# Patient Record
Sex: FEMALE | Race: WHITE | NOT HISPANIC OR LATINO
[De-identification: names, ages, dates, MRNs, and addresses within clinical notes are randomized per-mention and may not be internally consistent; named-entity substitution may affect disease eponyms.]

---

## 2017-05-30 ENCOUNTER — APPOINTMENT (OUTPATIENT)
Dept: CARDIOLOGY | Facility: CLINIC | Age: 67
End: 2017-05-30

## 2017-05-30 VITALS
WEIGHT: 292 LBS | HEART RATE: 50 BPM | DIASTOLIC BLOOD PRESSURE: 78 MMHG | SYSTOLIC BLOOD PRESSURE: 124 MMHG | BODY MASS INDEX: 45.83 KG/M2 | OXYGEN SATURATION: 96 % | HEIGHT: 67 IN

## 2017-05-30 DIAGNOSIS — Z78.9 OTHER SPECIFIED HEALTH STATUS: ICD-10-CM

## 2017-05-30 DIAGNOSIS — K58.9 IRRITABLE BOWEL SYNDROME W/OUT DIARRHEA: ICD-10-CM

## 2017-05-30 DIAGNOSIS — G51.3 CLONIC HEMIFACIAL SPASM: ICD-10-CM

## 2017-06-13 ENCOUNTER — OUTPATIENT (OUTPATIENT)
Dept: OUTPATIENT SERVICES | Facility: HOSPITAL | Age: 67
LOS: 1 days | End: 2017-06-13

## 2017-06-13 ENCOUNTER — APPOINTMENT (OUTPATIENT)
Dept: CV DIAGNOSITCS | Facility: HOSPITAL | Age: 67
End: 2017-06-13

## 2017-06-13 DIAGNOSIS — M17.10 UNILATERAL PRIMARY OSTEOARTHRITIS, UNSPECIFIED KNEE: ICD-10-CM

## 2017-06-15 ENCOUNTER — OTHER (OUTPATIENT)
Age: 67
End: 2017-06-15

## 2017-07-24 ENCOUNTER — OTHER (OUTPATIENT)
Age: 67
End: 2017-07-24

## 2017-12-12 ENCOUNTER — NON-APPOINTMENT (OUTPATIENT)
Age: 67
End: 2017-12-12

## 2017-12-12 ENCOUNTER — APPOINTMENT (OUTPATIENT)
Dept: CARDIOLOGY | Facility: CLINIC | Age: 67
End: 2017-12-12
Payer: COMMERCIAL

## 2017-12-12 VITALS
WEIGHT: 293 LBS | HEART RATE: 46 BPM | BODY MASS INDEX: 45.99 KG/M2 | DIASTOLIC BLOOD PRESSURE: 77 MMHG | SYSTOLIC BLOOD PRESSURE: 122 MMHG | HEIGHT: 67 IN | OXYGEN SATURATION: 94 %

## 2017-12-12 LAB
BASOPHILS # BLD AUTO: 0.05 K/UL
BASOPHILS NFR BLD AUTO: 0.8 %
EOSINOPHIL # BLD AUTO: 0.38 K/UL
EOSINOPHIL NFR BLD AUTO: 6.3 %
HBA1C MFR BLD HPLC: 5.6 %
HCT VFR BLD CALC: 41.3 %
HGB BLD-MCNC: 13.1 G/DL
IMM GRANULOCYTES NFR BLD AUTO: 0.2 %
LYMPHOCYTES # BLD AUTO: 1.38 K/UL
LYMPHOCYTES NFR BLD AUTO: 22.8 %
MAN DIFF?: NORMAL
MCHC RBC-ENTMCNC: 27.3 PG
MCHC RBC-ENTMCNC: 31.7 GM/DL
MCV RBC AUTO: 86 FL
MONOCYTES # BLD AUTO: 0.58 K/UL
MONOCYTES NFR BLD AUTO: 9.6 %
NEUTROPHILS # BLD AUTO: 3.66 K/UL
NEUTROPHILS NFR BLD AUTO: 60.3 %
PLATELET # BLD AUTO: 276 K/UL
RBC # BLD: 4.8 M/UL
RBC # FLD: 15.8 %
WBC # FLD AUTO: 6.06 K/UL

## 2017-12-12 PROCEDURE — 99214 OFFICE O/P EST MOD 30 MIN: CPT

## 2017-12-12 PROCEDURE — 93000 ELECTROCARDIOGRAM COMPLETE: CPT

## 2017-12-14 LAB
ALBUMIN SERPL ELPH-MCNC: 4.1 G/DL
ALP BLD-CCNC: 35 U/L
ALT SERPL-CCNC: 16 U/L
ANION GAP SERPL CALC-SCNC: 14 MMOL/L
AST SERPL-CCNC: 21 U/L
BILIRUB SERPL-MCNC: 0.3 MG/DL
BUN SERPL-MCNC: 19 MG/DL
CALCIUM SERPL-MCNC: 10.2 MG/DL
CHLORIDE SERPL-SCNC: 103 MMOL/L
CHOLEST SERPL-MCNC: 183 MG/DL
CHOLEST/HDLC SERPL: 2.7 RATIO
CO2 SERPL-SCNC: 24 MMOL/L
CREAT SERPL-MCNC: 0.95 MG/DL
GLUCOSE SERPL-MCNC: 92 MG/DL
HDLC SERPL-MCNC: 68 MG/DL
LDLC SERPL CALC-MCNC: 102 MG/DL
POTASSIUM SERPL-SCNC: 4.7 MMOL/L
PROT SERPL-MCNC: 7.6 G/DL
SODIUM SERPL-SCNC: 141 MMOL/L
TRIGL SERPL-MCNC: 63 MG/DL
TSH SERPL-ACNC: 2.03 UIU/ML

## 2018-10-02 ENCOUNTER — NON-APPOINTMENT (OUTPATIENT)
Age: 68
End: 2018-10-02

## 2018-10-02 ENCOUNTER — APPOINTMENT (OUTPATIENT)
Dept: CARDIOLOGY | Facility: CLINIC | Age: 68
End: 2018-10-02
Payer: COMMERCIAL

## 2018-10-02 VITALS
WEIGHT: 285 LBS | SYSTOLIC BLOOD PRESSURE: 115 MMHG | OXYGEN SATURATION: 95 % | BODY MASS INDEX: 44.73 KG/M2 | DIASTOLIC BLOOD PRESSURE: 78 MMHG | HEART RATE: 46 BPM | HEIGHT: 67 IN

## 2018-10-02 PROCEDURE — 99214 OFFICE O/P EST MOD 30 MIN: CPT

## 2018-10-02 PROCEDURE — 93000 ELECTROCARDIOGRAM COMPLETE: CPT

## 2018-11-26 ENCOUNTER — APPOINTMENT (OUTPATIENT)
Dept: SURGERY | Facility: CLINIC | Age: 68
End: 2018-11-26
Payer: COMMERCIAL

## 2018-11-26 PROCEDURE — 99205K: CUSTOM

## 2018-11-27 ENCOUNTER — NON-APPOINTMENT (OUTPATIENT)
Age: 68
End: 2018-11-27

## 2018-11-27 ENCOUNTER — APPOINTMENT (OUTPATIENT)
Dept: CV DIAGNOSITCS | Facility: HOSPITAL | Age: 68
End: 2018-11-27
Payer: COMMERCIAL

## 2018-11-27 ENCOUNTER — APPOINTMENT (OUTPATIENT)
Dept: CARDIOLOGY | Facility: CLINIC | Age: 68
End: 2018-11-27
Payer: COMMERCIAL

## 2018-11-27 ENCOUNTER — OUTPATIENT (OUTPATIENT)
Dept: OUTPATIENT SERVICES | Facility: HOSPITAL | Age: 68
LOS: 1 days | End: 2018-11-27

## 2018-11-27 VITALS
OXYGEN SATURATION: 94 % | RESPIRATION RATE: 18 BRPM | HEIGHT: 67 IN | BODY MASS INDEX: 44.73 KG/M2 | SYSTOLIC BLOOD PRESSURE: 115 MMHG | WEIGHT: 285 LBS | HEART RATE: 54 BPM | DIASTOLIC BLOOD PRESSURE: 79 MMHG

## 2018-11-27 DIAGNOSIS — I34.0 NONRHEUMATIC MITRAL (VALVE) INSUFFICIENCY: ICD-10-CM

## 2018-11-27 PROCEDURE — 99214 OFFICE O/P EST MOD 30 MIN: CPT

## 2018-11-27 PROCEDURE — 93000 ELECTROCARDIOGRAM COMPLETE: CPT

## 2018-11-27 PROCEDURE — 93306 TTE W/DOPPLER COMPLETE: CPT | Mod: 26

## 2018-11-27 RX ORDER — CELECOXIB 200 MG/1
200 CAPSULE ORAL DAILY
Refills: 0 | Status: DISCONTINUED | COMMUNITY
End: 2018-11-27

## 2018-11-27 NOTE — ASSESSMENT
[FreeTextEntry1] : 69 yo w \par Obesity - not able to see Dr Jennings. Will discuss after breast surgery. recommended programming at [facility] also.\par VHD - MR only mild on today's echo 9 reviewed images). No symtpoms.\par Breast surgery - with Dr Rea scheduled for next month\par anxiety/depression - no change -- sees therapist regualrly

## 2018-11-27 NOTE — PHYSICAL EXAM
[General Appearance - Well Developed] : well developed [General Appearance - Well Nourished] : well nourished [Normal Conjunctiva] : the conjunctiva exhibited no abnormalities [Eyelids - No Xanthelasma] : the eyelids demonstrated no xanthelasmas [Normal Oral Mucosa] : normal oral mucosa [No Oral Pallor] : no oral pallor [No Oral Cyanosis] : no oral cyanosis [Normal Jugular Venous A Waves Present] : normal jugular venous A waves present [Normal Jugular Venous V Waves Present] : normal jugular venous V waves present [No Jugular Venous Castellanos A Waves] : no jugular venous castellanos A waves [Respiration, Rhythm And Depth] : normal respiratory rhythm and effort [Exaggerated Use Of Accessory Muscles For Inspiration] : no accessory muscle use [Auscultation Breath Sounds / Voice Sounds] : lungs were clear to auscultation bilaterally [Not Palpable] : not palpable [No Precordial Heave] : no precordial heave was noted [Normal S1] : normal S1 [Normal S2] : normal S2 [Click] : no click [2+] : left 2+ [Right Carotid Bruit] : no bruit heard over the right carotid [Left Carotid Bruit] : no bruit heard over the left carotid [1+] : left 1+ [No Abnormalities] : the abdominal aorta was not enlarged and no bruit was heard [___ +] : bilateral [unfilled]U+ pitting edema to the ankles [Abdomen Soft] : soft [Abdomen Tenderness] : non-tender [Abdomen Mass (___ Cm)] : no abdominal mass palpated [Abnormal Walk] : normal gait [Gait - Sufficient For Exercise Testing] : the gait was sufficient for exercise testing [Nail Clubbing] : no clubbing of the fingernails [Cyanosis, Localized] : no localized cyanosis [Petechial Hemorrhages (___cm)] : no petechial hemorrhages [Skin Color & Pigmentation] : normal skin color and pigmentation [] : no rash [No Venous Stasis] : no venous stasis [Skin Lesions] : no skin lesions [No Skin Ulcers] : no skin ulcer [No Xanthoma] : no  xanthoma was observed [Oriented To Time, Place, And Person] : oriented to person, place, and time [Impaired Insight] : insight and judgment were intact [FreeTextEntry1] : depressed affect

## 2018-11-27 NOTE — REASON FOR VISIT
[FreeTextEntry1] : 67 yo obese woman with hypothyroidism , depression , peripheral edema here for visit prior to scheduled breast biopsy w KK on Dec 18th.\par \par Stopped celebrex and trying to manage without. \par saw Dr Hernandez but would prefer to continue with her opoutpt therapist.\par \par No CP, BUCKLEY or new complaints.\par

## 2018-11-29 ENCOUNTER — TRANSCRIPTION ENCOUNTER (OUTPATIENT)
Age: 68
End: 2018-11-29

## 2018-12-10 ENCOUNTER — APPOINTMENT (OUTPATIENT)
Dept: ULTRASOUND IMAGING | Facility: IMAGING CENTER | Age: 68
End: 2018-12-10
Payer: COMMERCIAL

## 2018-12-10 ENCOUNTER — OUTPATIENT (OUTPATIENT)
Dept: OUTPATIENT SERVICES | Facility: HOSPITAL | Age: 68
LOS: 1 days | End: 2018-12-10
Payer: COMMERCIAL

## 2018-12-10 ENCOUNTER — OUTPATIENT (OUTPATIENT)
Dept: OUTPATIENT SERVICES | Facility: HOSPITAL | Age: 68
LOS: 1 days | End: 2018-12-10

## 2018-12-10 VITALS
WEIGHT: 287.04 LBS | HEART RATE: 57 BPM | RESPIRATION RATE: 16 BRPM | SYSTOLIC BLOOD PRESSURE: 118 MMHG | DIASTOLIC BLOOD PRESSURE: 80 MMHG | HEIGHT: 66.5 IN | OXYGEN SATURATION: 98 % | TEMPERATURE: 97 F

## 2018-12-10 DIAGNOSIS — R92.8 OTHER ABNORMAL AND INCONCLUSIVE FINDINGS ON DIAGNOSTIC IMAGING OF BREAST: ICD-10-CM

## 2018-12-10 DIAGNOSIS — Z98.890 OTHER SPECIFIED POSTPROCEDURAL STATES: Chronic | ICD-10-CM

## 2018-12-10 DIAGNOSIS — Z96.653 PRESENCE OF ARTIFICIAL KNEE JOINT, BILATERAL: Chronic | ICD-10-CM

## 2018-12-10 DIAGNOSIS — I34.0 NONRHEUMATIC MITRAL (VALVE) INSUFFICIENCY: ICD-10-CM

## 2018-12-10 DIAGNOSIS — Z00.8 ENCOUNTER FOR OTHER GENERAL EXAMINATION: ICD-10-CM

## 2018-12-10 DIAGNOSIS — Z90.89 ACQUIRED ABSENCE OF OTHER ORGANS: Chronic | ICD-10-CM

## 2018-12-10 DIAGNOSIS — Z85.828 PERSONAL HISTORY OF OTHER MALIGNANT NEOPLASM OF SKIN: Chronic | ICD-10-CM

## 2018-12-10 DIAGNOSIS — J45.909 UNSPECIFIED ASTHMA, UNCOMPLICATED: ICD-10-CM

## 2018-12-10 DIAGNOSIS — E66.9 OBESITY, UNSPECIFIED: ICD-10-CM

## 2018-12-10 DIAGNOSIS — E03.9 HYPOTHYROIDISM, UNSPECIFIED: ICD-10-CM

## 2018-12-10 LAB
BUN SERPL-MCNC: 25 MG/DL — HIGH (ref 7–23)
CALCIUM SERPL-MCNC: 9.9 MG/DL — SIGNIFICANT CHANGE UP (ref 8.4–10.5)
CHLORIDE SERPL-SCNC: 105 MMOL/L — SIGNIFICANT CHANGE UP (ref 98–107)
CO2 SERPL-SCNC: 26 MMOL/L — SIGNIFICANT CHANGE UP (ref 22–31)
CREAT SERPL-MCNC: 0.82 MG/DL — SIGNIFICANT CHANGE UP (ref 0.5–1.3)
GLUCOSE SERPL-MCNC: 94 MG/DL — SIGNIFICANT CHANGE UP (ref 70–99)
HCT VFR BLD CALC: 43.5 % — SIGNIFICANT CHANGE UP (ref 34.5–45)
HGB BLD-MCNC: 13.8 G/DL — SIGNIFICANT CHANGE UP (ref 11.5–15.5)
MCHC RBC-ENTMCNC: 27.8 PG — SIGNIFICANT CHANGE UP (ref 27–34)
MCHC RBC-ENTMCNC: 31.7 % — LOW (ref 32–36)
MCV RBC AUTO: 87.7 FL — SIGNIFICANT CHANGE UP (ref 80–100)
NRBC # FLD: 0 — SIGNIFICANT CHANGE UP
PLATELET # BLD AUTO: 250 K/UL — SIGNIFICANT CHANGE UP (ref 150–400)
PMV BLD: 10.5 FL — SIGNIFICANT CHANGE UP (ref 7–13)
POTASSIUM SERPL-MCNC: 4.3 MMOL/L — SIGNIFICANT CHANGE UP (ref 3.5–5.3)
POTASSIUM SERPL-SCNC: 4.3 MMOL/L — SIGNIFICANT CHANGE UP (ref 3.5–5.3)
RBC # BLD: 4.96 M/UL — SIGNIFICANT CHANGE UP (ref 3.8–5.2)
RBC # FLD: 14.8 % — HIGH (ref 10.3–14.5)
SODIUM SERPL-SCNC: 142 MMOL/L — SIGNIFICANT CHANGE UP (ref 135–145)
WBC # BLD: 6.35 K/UL — SIGNIFICANT CHANGE UP (ref 3.8–10.5)
WBC # FLD AUTO: 6.35 K/UL — SIGNIFICANT CHANGE UP (ref 3.8–10.5)

## 2018-12-10 PROCEDURE — 19285 PERQ DEV BREAST 1ST US IMAG: CPT | Mod: RT

## 2018-12-10 PROCEDURE — 19285 PERQ DEV BREAST 1ST US IMAG: CPT

## 2018-12-10 PROCEDURE — C1739: CPT

## 2018-12-10 RX ORDER — ASCORBIC ACID 60 MG
0 TABLET,CHEWABLE ORAL
Qty: 0 | Refills: 0 | COMMUNITY

## 2018-12-10 RX ORDER — OMEGA-3 ACID ETHYL ESTERS 1 G
1 CAPSULE ORAL
Qty: 0 | Refills: 0 | COMMUNITY

## 2018-12-10 RX ORDER — ELETRIPTAN HYDROBROMIDE 20 MG/1
0 TABLET, FILM COATED ORAL
Qty: 0 | Refills: 0 | COMMUNITY

## 2018-12-10 RX ORDER — SODIUM CHLORIDE 9 MG/ML
1000 INJECTION, SOLUTION INTRAVENOUS
Qty: 0 | Refills: 0 | Status: DISCONTINUED | OUTPATIENT
Start: 2018-12-18 | End: 2019-01-02

## 2018-12-10 RX ORDER — ESCITALOPRAM OXALATE 10 MG/1
1 TABLET, FILM COATED ORAL
Qty: 0 | Refills: 0 | COMMUNITY

## 2018-12-10 NOTE — H&P PST ADULT - SOURCE OF INFORMATION, PROFILE
Airway patent, Nasal mucosa clear. Mouth with normal mucosa. Throat has no vesicles, no oropharyngeal exudates and uvula is midline. chart(s)/patient

## 2018-12-10 NOTE — H&P PST ADULT - PSH
H/O right breast biopsy  11/2018, abnormal  H/O sinus surgery  x4  H/O total knee replacement, bilateral  Left 2006  Right 1/2018  History of D&C  about 4  History of skin cancer  multiple excisions  History of tonsillectomy

## 2018-12-10 NOTE — H&P PST ADULT - NEGATIVE OPHTHALMOLOGIC SYMPTOMS
no loss of vision L/no photophobia/no blurred vision L/no loss of vision R/no diplopia/no pain R/no blurred vision R/no pain L

## 2018-12-10 NOTE — H&P PST ADULT - MUSCULOSKELETAL
details… detailed exam no joint erythema/normal strength/ankles/no joint warmth/no calf tenderness/decreased ROM/no joint swelling

## 2018-12-10 NOTE — H&P PST ADULT - NEGATIVE BREAST SYMPTOMS
no breast lump L/no nipple discharge L/no breast tenderness L/no breast lump R/no breast tenderness R

## 2018-12-10 NOTE — H&P PST ADULT - HISTORY OF PRESENT ILLNESS
68 year old female presents to presurgical testing with diagnosis of other abnormal and inconclusive findings on diagnostic imaging of breast scheduled for right breast biopsy with seed localization for 12/18/18. Pt with Abnormal surveillance mammogram, followed by breast sonogram, and core biopsy of right breast with abnormal atypical cells. Pt denies breast lumps or nipple discharge.

## 2018-12-10 NOTE — H&P PST ADULT - RS GEN PE MLT RESP DETAILS PC
clear to auscultation bilaterally/breath sounds equal/no wheezes/good air movement/no rales/respirations non-labored/airway patent/no rhonchi

## 2018-12-10 NOTE — H&P PST ADULT - PROBLEM SELECTOR PLAN 4
no recent exacerbations. Instructed to use Symbicort on the day of procedure and bring rescue inhaler

## 2018-12-10 NOTE — H&P PST ADULT - NEGATIVE NEUROLOGICAL SYMPTOMS
no focal seizures/no tremors/no vertigo/no weakness/no paresthesias/no generalized seizures/no transient paralysis

## 2018-12-10 NOTE — H&P PST ADULT - NEGATIVE ENMT SYMPTOMS
no hearing difficulty/no vertigo/no dysphagia/no nose bleeds/no throat pain/no ear pain/no tinnitus/no sinus symptoms

## 2018-12-10 NOTE — H&P PST ADULT - FAMILY HISTORY
Father  Still living? Unknown  Family history of melanoma, Age at diagnosis: Age Unknown     Mother  Still living? No  Family history of melanoma, Age at diagnosis: Age Unknown

## 2018-12-10 NOTE — H&P PST ADULT - CARDIOVASCULAR DETAILS
Delayed oral transit time/Lingual stasis impacted by cognition/Decreased anterior-posterior movement of the bolus/Delayed oral transit time Delayed oral transit time/Decreased anterior-posterior movement of the bolus/Lingual stasis/impacted by cognition positive S1/positive S2

## 2018-12-10 NOTE — H&P PST ADULT - PROBLEM SELECTOR PLAN 1
Pt is scheduled for right breast biopsy with seed localization for 12/18/18. Pre-op instructions provided. Pt will take own Prilosec for GI prophylaxis. Chlorhexidine soap provided for skin prep. Pt stated understanding.    ANTONIO precautions, OR booking notified

## 2018-12-10 NOTE — H&P PST ADULT - PMH
Asthma    Depression    GERD (gastroesophageal reflux disease)    Smiley syndrome    Hypothyroidism    IBS (irritable bowel syndrome)    Migraine    Mitral regurgitation  mild, on echo 2018  Obesity    Osteoarthritis    Other abnormal and inconclusive findings on diagnostic imaging of breast    Psoriasis    Rheumatoid arthritis    Sicca syndrome    Skin cancer  hx of

## 2018-12-14 PROBLEM — M06.9 RHEUMATOID ARTHRITIS, UNSPECIFIED: Chronic | Status: ACTIVE | Noted: 2018-12-10

## 2018-12-14 PROBLEM — K58.9 IRRITABLE BOWEL SYNDROME WITHOUT DIARRHEA: Chronic | Status: ACTIVE | Noted: 2018-12-10

## 2018-12-14 PROBLEM — E03.9 HYPOTHYROIDISM, UNSPECIFIED: Chronic | Status: ACTIVE | Noted: 2018-12-10

## 2018-12-14 PROBLEM — E66.9 OBESITY, UNSPECIFIED: Chronic | Status: ACTIVE | Noted: 2018-12-10

## 2018-12-14 PROBLEM — K21.9 GASTRO-ESOPHAGEAL REFLUX DISEASE WITHOUT ESOPHAGITIS: Chronic | Status: ACTIVE | Noted: 2018-12-10

## 2018-12-14 PROBLEM — F32.9 MAJOR DEPRESSIVE DISORDER, SINGLE EPISODE, UNSPECIFIED: Chronic | Status: ACTIVE | Noted: 2018-12-10

## 2018-12-14 PROBLEM — M19.90 UNSPECIFIED OSTEOARTHRITIS, UNSPECIFIED SITE: Chronic | Status: ACTIVE | Noted: 2018-12-10

## 2018-12-14 PROBLEM — K58.9 IRRITABLE BOWEL SYNDROME, UNSPECIFIED: Chronic | Status: ACTIVE | Noted: 2018-12-10

## 2018-12-14 PROBLEM — I34.0 NONRHEUMATIC MITRAL (VALVE) INSUFFICIENCY: Chronic | Status: ACTIVE | Noted: 2018-12-10

## 2018-12-14 PROBLEM — J45.909 UNSPECIFIED ASTHMA, UNCOMPLICATED: Chronic | Status: ACTIVE | Noted: 2018-12-10

## 2018-12-14 PROBLEM — G90.2 HORNER'S SYNDROME: Chronic | Status: ACTIVE | Noted: 2018-12-10

## 2018-12-14 PROBLEM — G43.909 MIGRAINE, UNSPECIFIED, NOT INTRACTABLE, WITHOUT STATUS MIGRAINOSUS: Chronic | Status: ACTIVE | Noted: 2018-12-10

## 2018-12-14 PROBLEM — M35.00 SJOGREN SYNDROME, UNSPECIFIED: Chronic | Status: ACTIVE | Noted: 2018-12-10

## 2018-12-14 PROBLEM — L40.9 PSORIASIS, UNSPECIFIED: Chronic | Status: ACTIVE | Noted: 2018-12-10

## 2018-12-14 PROBLEM — R92.8 OTHER ABNORMAL AND INCONCLUSIVE FINDINGS ON DIAGNOSTIC IMAGING OF BREAST: Chronic | Status: ACTIVE | Noted: 2018-12-10

## 2018-12-18 ENCOUNTER — RESULT REVIEW (OUTPATIENT)
Age: 68
End: 2018-12-18

## 2018-12-18 ENCOUNTER — OUTPATIENT (OUTPATIENT)
Dept: OUTPATIENT SERVICES | Facility: HOSPITAL | Age: 68
LOS: 1 days | Discharge: HOME | End: 2018-12-18
Payer: COMMERCIAL

## 2018-12-18 ENCOUNTER — APPOINTMENT (OUTPATIENT)
Dept: SURGERY | Facility: HOSPITAL | Age: 68
End: 2018-12-18

## 2018-12-18 VITALS
HEART RATE: 52 BPM | OXYGEN SATURATION: 98 % | RESPIRATION RATE: 18 BRPM | SYSTOLIC BLOOD PRESSURE: 119 MMHG | DIASTOLIC BLOOD PRESSURE: 58 MMHG

## 2018-12-18 VITALS
HEIGHT: 66.5 IN | OXYGEN SATURATION: 97 % | RESPIRATION RATE: 16 BRPM | TEMPERATURE: 98 F | WEIGHT: 287.04 LBS | SYSTOLIC BLOOD PRESSURE: 139 MMHG | DIASTOLIC BLOOD PRESSURE: 60 MMHG | HEART RATE: 56 BPM

## 2018-12-18 DIAGNOSIS — Z98.890 OTHER SPECIFIED POSTPROCEDURAL STATES: Chronic | ICD-10-CM

## 2018-12-18 DIAGNOSIS — R92.8 OTHER ABNORMAL AND INCONCLUSIVE FINDINGS ON DIAGNOSTIC IMAGING OF BREAST: ICD-10-CM

## 2018-12-18 DIAGNOSIS — Z85.828 PERSONAL HISTORY OF OTHER MALIGNANT NEOPLASM OF SKIN: Chronic | ICD-10-CM

## 2018-12-18 DIAGNOSIS — Z96.653 PRESENCE OF ARTIFICIAL KNEE JOINT, BILATERAL: Chronic | ICD-10-CM

## 2018-12-18 DIAGNOSIS — Z90.89 ACQUIRED ABSENCE OF OTHER ORGANS: Chronic | ICD-10-CM

## 2018-12-18 PROCEDURE — 88307 TISSUE EXAM BY PATHOLOGIST: CPT | Mod: 26

## 2018-12-18 PROCEDURE — 76098 X-RAY EXAM SURGICAL SPECIMEN: CPT | Mod: 26

## 2018-12-18 PROCEDURE — 19125K: CUSTOM | Mod: RT

## 2018-12-18 PROCEDURE — 88360 TUMOR IMMUNOHISTOCHEM/MANUAL: CPT | Mod: 26

## 2018-12-18 RX ADMIN — SODIUM CHLORIDE 30 MILLILITER(S): 9 INJECTION, SOLUTION INTRAVENOUS at 07:38

## 2018-12-18 NOTE — ASU DISCHARGE PLAN (ADULT/PEDIATRIC). - NOTIFY
Numbness, color, or temperature change to extremity/Bleeding that does not stop/Swelling that continues/Pain not relieved by Medications/Fever greater than 101/Persistent Nausea and Vomiting/Unable to Urinate

## 2018-12-18 NOTE — ASU DISCHARGE PLAN (ADULT/PEDIATRIC). - MEDICATION SUMMARY - MEDICATIONS TO TAKE
I will START or STAY ON the medications listed below when I get home from the hospital:    Immune complete 1 tab 2x/day last dose 12/10  -- Indication: For home med     amoxicillin 500 mg 4 caps orally prior to dental work  -- Indication: For home med    Omega 3 fish oil 1200 mg daily  last dose 12/10  -- Indication: For home med    Omega 3-6-9 1000 mg daily  last dose 12/10  -- Indication: For home med    STACEY E 200 mg orally daily  last dose 2/10  -- Indication: For home med    glucosamine/chondrotin 1500/1200  2x/day  last dose 12/10  -- Indication: For home med    pregnanalone 30 mg orally daily  last dose 12/10  -- Indication: For home med    turmeric with bioperine 1 cap daily  last dose 12/10  -- Indication: For home med    COQ10 200 mg orally daily last dose 12/10  -- Indication: For home med    Relpax 40 mg oral tablet  -- 1 tab(s) by mouth once a day, As Needed for headache  -- Indication: For home med    aspirin 81 mg oral tablet  -- 1 tab(s) by mouth once a day  last dose 12/10  -- Indication: For home med    Tylenol 500 mg oral tablet  -- 2 tab(s) by mouth every 6 hours prn pain   -- Indication: For pain med as needed     Aimovig 70 mg/mL subcutaneous solution  -- subcutaneous once a month  -- Indication: For home med    propranolol 20 mg oral tablet  -- 1 tab(s) by mouth 2 times a day  -- Indication: For home med    topiramate 25 mg oral tablet  -- 2 tab(s) by mouth once a day  -- Indication: For home med    Lexapro 20 mg oral tablet  -- 1 tab(s) by mouth once a day (at bedtime)  -- Indication: For home med    Plaquenil 200 mg oral tablet  -- 1 tab(s) by mouth 2 times a day  -- Indication: For home med    Ventolin HFA 90 mcg/inh inhalation aerosol  -- 2 puff(s) inhaled 4 times a day, As Needed  -- Indication: For home med    Symbicort 160 mcg-4.5 mcg/inh inhalation aerosol  -- 2 puff(s) inhaled 2 times a day  -- Indication: For home med    clobetasol 0.05% topical cream  -- Apply on skin to affected area 2 times a day, As Needed  -- Indication: For home med    Flonase 50 mcg/inh nasal spray  -- 1 spray(s) into nose once a day  -- Indication: For home med    DHEA 25 mg oral tablet  -- 1 tab(s) by mouth once a day  last dose 12/10  -- Indication: For home med    Lutein 20 mg oral capsule  -- 1 cap(s) by mouth once a day  -- Indication: For home med    PriLOSEC OTC 20 mg oral delayed release tablet  -- 1 tab(s) by mouth once a day  -- Indication: For home med    Synthroid 125 mcg (0.125 mg) oral tablet  -- 1 tab(s) by mouth once a day  -- Indication: For home med    Centrum oral tablet  -- 1 tab(s) by mouth once a day  last dose 12/10  -- Indication: For home med    Vitamin B6 100 mg oral tablet  -- 1 tab(s) by mouth once a day  -- Indication: For home med    Vitamin D3 1000 intl units oral capsule  -- 1 cap(s) by mouth once a day  -- Indication: For home med    Laura-C 500 mg oral tablet  -- orally 2 times a day  -- Indication: For home med

## 2018-12-18 NOTE — ASU DISCHARGE PLAN (ADULT/PEDIATRIC). - NURSING INSTRUCTIONS
Please report any signs and symptoms of infection including Fever (Temp >101 or >100.4 if GYN procedure), uncontrollable nausea, vomiting, diarrhea, chills & inability to urinate. Shower with soap & water when appropriate, pat dry with clean towel & no ointments, creams, powders or lotions on incisions unless okayed by MD. Please report any puss or increased drainage from incision sites, or if redness develops and spreads around sites. Please practice good hand hygiene especially after using the bathroom. Follow up with all MD appointments and take medication(s) as prescribed  Wear bra for comfort and support (24/7). Use ice to reduce pain and swelling. Remove dressing as instructed by your doctor. Leave steri strips intact.  Dr Rea Discharge instruction sheet provided as well

## 2018-12-21 LAB — SURGICAL PATHOLOGY STUDY: SIGNIFICANT CHANGE UP

## 2018-12-26 ENCOUNTER — APPOINTMENT (OUTPATIENT)
Dept: SURGERY | Facility: CLINIC | Age: 68
End: 2018-12-26

## 2019-01-02 ENCOUNTER — APPOINTMENT (OUTPATIENT)
Dept: SURGERY | Facility: CLINIC | Age: 69
End: 2019-01-02
Payer: COMMERCIAL

## 2019-01-02 PROCEDURE — 99024 POSTOP FOLLOW-UP VISIT: CPT

## 2019-01-03 ENCOUNTER — APPOINTMENT (OUTPATIENT)
Dept: MRI IMAGING | Facility: IMAGING CENTER | Age: 69
End: 2019-01-03
Payer: COMMERCIAL

## 2019-01-03 ENCOUNTER — OUTPATIENT (OUTPATIENT)
Dept: OUTPATIENT SERVICES | Facility: HOSPITAL | Age: 69
LOS: 1 days | End: 2019-01-03
Payer: COMMERCIAL

## 2019-01-03 DIAGNOSIS — Z98.890 OTHER SPECIFIED POSTPROCEDURAL STATES: Chronic | ICD-10-CM

## 2019-01-03 DIAGNOSIS — Z90.89 ACQUIRED ABSENCE OF OTHER ORGANS: Chronic | ICD-10-CM

## 2019-01-03 DIAGNOSIS — Z85.828 PERSONAL HISTORY OF OTHER MALIGNANT NEOPLASM OF SKIN: Chronic | ICD-10-CM

## 2019-01-03 DIAGNOSIS — Z96.653 PRESENCE OF ARTIFICIAL KNEE JOINT, BILATERAL: Chronic | ICD-10-CM

## 2019-01-03 DIAGNOSIS — Z00.8 ENCOUNTER FOR OTHER GENERAL EXAMINATION: ICD-10-CM

## 2019-01-03 PROCEDURE — 77049 MRI BREAST C-+ W/CAD BI: CPT | Mod: 26

## 2019-01-03 PROCEDURE — C8937: CPT

## 2019-01-03 PROCEDURE — C8908: CPT

## 2019-01-03 PROCEDURE — A9585: CPT

## 2019-01-10 ENCOUNTER — RESULT REVIEW (OUTPATIENT)
Age: 69
End: 2019-01-10

## 2019-01-10 ENCOUNTER — APPOINTMENT (OUTPATIENT)
Dept: ULTRASOUND IMAGING | Facility: IMAGING CENTER | Age: 69
End: 2019-01-10
Payer: COMMERCIAL

## 2019-01-10 ENCOUNTER — OUTPATIENT (OUTPATIENT)
Dept: OUTPATIENT SERVICES | Facility: HOSPITAL | Age: 69
LOS: 1 days | End: 2019-01-10
Payer: COMMERCIAL

## 2019-01-10 DIAGNOSIS — Z90.89 ACQUIRED ABSENCE OF OTHER ORGANS: Chronic | ICD-10-CM

## 2019-01-10 DIAGNOSIS — Z98.890 OTHER SPECIFIED POSTPROCEDURAL STATES: Chronic | ICD-10-CM

## 2019-01-10 DIAGNOSIS — Z85.828 PERSONAL HISTORY OF OTHER MALIGNANT NEOPLASM OF SKIN: Chronic | ICD-10-CM

## 2019-01-10 DIAGNOSIS — Z96.653 PRESENCE OF ARTIFICIAL KNEE JOINT, BILATERAL: Chronic | ICD-10-CM

## 2019-01-10 DIAGNOSIS — Z00.8 ENCOUNTER FOR OTHER GENERAL EXAMINATION: ICD-10-CM

## 2019-01-10 PROCEDURE — 88305 TISSUE EXAM BY PATHOLOGIST: CPT

## 2019-01-10 PROCEDURE — 19083 BX BREAST 1ST LESION US IMAG: CPT | Mod: RT

## 2019-01-10 PROCEDURE — 88173 CYTOPATH EVAL FNA REPORT: CPT | Mod: 26

## 2019-01-10 PROCEDURE — 77065 DX MAMMO INCL CAD UNI: CPT | Mod: 26,RT

## 2019-01-10 PROCEDURE — 88173 CYTOPATH EVAL FNA REPORT: CPT

## 2019-01-10 PROCEDURE — 88305 TISSUE EXAM BY PATHOLOGIST: CPT | Mod: 26

## 2019-01-10 PROCEDURE — 88305 TISSUE EXAM BY PATHOLOGIST: CPT | Mod: 26,59

## 2019-01-10 PROCEDURE — 19083 BX BREAST 1ST LESION US IMAG: CPT

## 2019-01-10 PROCEDURE — 77065 DX MAMMO INCL CAD UNI: CPT

## 2019-01-14 NOTE — ASU PATIENT PROFILE, ADULT - PATIENT'S HEIGHT AND WEIGHT RECORDED IN THE VITAL SIGNS FLOWSHEET
Spoke with patient regarding status of medication  Patient's co-pay is over 2,000 dollars  Our office is now working on funding to provide for the medication  We will be updated when the next steps are ready  Patient voiced concern in not being on therapy for 3 weeks  Discussed that this is obviously not ideal but this is the recommended next treatment  Dr Marta Kelly is in agreement 
yes

## 2019-01-15 LAB — NON-GYNECOLOGICAL CYTOLOGY STUDY: SIGNIFICANT CHANGE UP

## 2019-01-22 ENCOUNTER — OUTPATIENT (OUTPATIENT)
Dept: OUTPATIENT SERVICES | Facility: HOSPITAL | Age: 69
LOS: 1 days | Discharge: HOME | End: 2019-01-22
Payer: COMMERCIAL

## 2019-01-22 ENCOUNTER — RESULT REVIEW (OUTPATIENT)
Age: 69
End: 2019-01-22

## 2019-01-22 ENCOUNTER — APPOINTMENT (OUTPATIENT)
Dept: NUCLEAR MEDICINE | Facility: HOSPITAL | Age: 69
End: 2019-01-22

## 2019-01-22 ENCOUNTER — APPOINTMENT (OUTPATIENT)
Dept: SURGERY | Facility: HOSPITAL | Age: 69
End: 2019-01-22

## 2019-01-22 VITALS
TEMPERATURE: 98 F | RESPIRATION RATE: 16 BRPM | SYSTOLIC BLOOD PRESSURE: 132 MMHG | HEART RATE: 55 BPM | OXYGEN SATURATION: 96 % | HEIGHT: 66.5 IN | DIASTOLIC BLOOD PRESSURE: 64 MMHG | WEIGHT: 287.04 LBS

## 2019-01-22 VITALS
TEMPERATURE: 98 F | SYSTOLIC BLOOD PRESSURE: 118 MMHG | OXYGEN SATURATION: 96 % | RESPIRATION RATE: 16 BRPM | HEART RATE: 54 BPM | DIASTOLIC BLOOD PRESSURE: 57 MMHG

## 2019-01-22 DIAGNOSIS — Z85.828 PERSONAL HISTORY OF OTHER MALIGNANT NEOPLASM OF SKIN: Chronic | ICD-10-CM

## 2019-01-22 DIAGNOSIS — Z98.890 OTHER SPECIFIED POSTPROCEDURAL STATES: Chronic | ICD-10-CM

## 2019-01-22 DIAGNOSIS — C50.911 MALIGNANT NEOPLASM OF UNSPECIFIED SITE OF RIGHT FEMALE BREAST: ICD-10-CM

## 2019-01-22 DIAGNOSIS — Z96.653 PRESENCE OF ARTIFICIAL KNEE JOINT, BILATERAL: Chronic | ICD-10-CM

## 2019-01-22 DIAGNOSIS — Z90.89 ACQUIRED ABSENCE OF OTHER ORGANS: Chronic | ICD-10-CM

## 2019-01-22 PROCEDURE — 19301K: CUSTOM | Mod: 78,RT

## 2019-01-22 PROCEDURE — 88307 TISSUE EXAM BY PATHOLOGIST: CPT | Mod: 26

## 2019-01-22 PROCEDURE — 38525K: CUSTOM | Mod: 78,RT

## 2019-01-22 PROCEDURE — 88305 TISSUE EXAM BY PATHOLOGIST: CPT | Mod: 26

## 2019-01-22 PROCEDURE — 38792K: CUSTOM | Mod: 78,RT

## 2019-01-22 RX ORDER — FENTANYL CITRATE 50 UG/ML
25 INJECTION INTRAVENOUS
Qty: 0 | Refills: 0 | Status: DISCONTINUED | OUTPATIENT
Start: 2019-01-22 | End: 2019-01-22

## 2019-01-22 RX ORDER — ONDANSETRON 8 MG/1
4 TABLET, FILM COATED ORAL ONCE
Qty: 0 | Refills: 0 | Status: DISCONTINUED | OUTPATIENT
Start: 2019-01-22 | End: 2019-02-06

## 2019-01-22 RX ORDER — SODIUM CHLORIDE 9 MG/ML
1000 INJECTION, SOLUTION INTRAVENOUS
Qty: 0 | Refills: 0 | Status: DISCONTINUED | OUTPATIENT
Start: 2019-01-22 | End: 2019-02-06

## 2019-01-22 NOTE — ASU DISCHARGE PLAN (ADULT/PEDIATRIC). - NURSING INSTRUCTIONS
Please refer to Dr. Rea's note in chart Please refer to Dr. Rea's note in chart  You were given 1000mg IV Tylenol for pain management.  Please DO NOT take Tylenol, Vicodin or Percocet for the next 6 hours (until 4:30 pm).  DO NOT EXCEED 3000MG OF TYLENOL OVER 24 HOURS.

## 2019-01-22 NOTE — ASU DISCHARGE PLAN (ADULT/PEDIATRIC). - SPECIAL INSTRUCTIONS
May take Tylenol for pain control. Please refer to Dr. Rea's note in chart May take Tylenol for pain control. Please refer to Dr. Rea's note in chart.

## 2019-01-22 NOTE — ASU DISCHARGE PLAN (ADULT/PEDIATRIC). - MEDICATION SUMMARY - MEDICATIONS TO TAKE
I will START or STAY ON the medications listed below when I get home from the hospital:    Immune complete 1 tab 2x/day last dose 12/10  -- Indication: For Home med    STACEY E 200 mg orally daily  last dose 2/10  -- Indication: For Home med    glucosamine/chondrotin 1500/1200  2x/day  last dose 12/10  -- Indication: For Home med    pregnanalone 30 mg orally daily  last dose 12/10  -- Indication: For Home med    Omega 3 fish oil 1200 mg daily  last dose 12/10  -- Indication: For Home med    Omega 3-6-9 1000 mg daily  last dose 12/10  -- Indication: For Home med    turmeric with bioperine 1 cap daily  last dose 12/10  -- Indication: For Home med    COQ10 200 mg orally daily last dose 12/10  -- Indication: For Home med    Relpax 40 mg oral tablet  -- 1 tab(s) by mouth once a day, As Needed for headache  -- Indication: For Home med    aspirin 81 mg oral tablet  -- 1 tab(s) by mouth once a day  last dose 12/10  -- Indication: For Home med    Tylenol 500 mg oral tablet  -- 2 tab(s) by mouth every 6 hours prn pain   -- Indication: For Home med/Postop pain    Aimovig 70 mg/mL subcutaneous solution  -- subcutaneous once a month  -- Indication: For Home med    propranolol 20 mg oral tablet  -- 1 tab(s) by mouth 2 times a day  -- Indication: For Home med    topiramate 25 mg oral tablet  -- 2 tab(s) by mouth once a day  -- Indication: For Home med    Lexapro 20 mg oral tablet  -- 1 tab(s) by mouth once a day (at bedtime)  -- Indication: For Home med    Plaquenil 200 mg oral tablet  -- 1 tab(s) by mouth 2 times a day  -- Indication: For Home med    Ventolin HFA 90 mcg/inh inhalation aerosol  -- 2 puff(s) inhaled 4 times a day, As Needed  -- Indication: For Home med    Symbicort 160 mcg-4.5 mcg/inh inhalation aerosol  -- 2 puff(s) inhaled 2 times a day  -- Indication: For Home med    clobetasol 0.05% topical cream  -- Apply on skin to affected area 2 times a day, As Needed  -- Indication: For Home med    Flonase 50 mcg/inh nasal spray  -- 1 spray(s) into nose once a day  -- Indication: For Home med    DHEA 25 mg oral tablet  -- 1 tab(s) by mouth once a day  last dose 12/10  -- Indication: For Home med    Lutein 20 mg oral capsule  -- 1 cap(s) by mouth once a day  -- Indication: For Home med    PriLOSEC OTC 20 mg oral delayed release tablet  -- 1 tab(s) by mouth once a day  -- Indication: For Home med    Synthroid 125 mcg (0.125 mg) oral tablet  -- 1 tab(s) by mouth once a day  -- Indication: For Home med    Centrum oral tablet  -- 1 tab(s) by mouth once a day  last dose 12/10  -- Indication: For Home med    Vitamin D3 1000 intl units oral capsule  -- 1 cap(s) by mouth once a day  -- Indication: For Home med    Laura-C 500 mg oral tablet  -- orally 2 times a day  -- Indication: For Home med    Vitamin B6 100 mg oral tablet  -- 1 tab(s) by mouth once a day  -- Indication: For Home med

## 2019-01-22 NOTE — ASU DISCHARGE PLAN (ADULT/PEDIATRIC). - NOTIFY
Unable to Urinate/Fever greater than 101/Pain not relieved by Medications/Bleeding that does not stop/Swelling that continues/Persistent Nausea and Vomiting/Inability to Tolerate Liquids or Foods

## 2019-01-22 NOTE — CHART NOTE - NSCHARTNOTEFT_GEN_A_CORE
Primary surgeon: Dr. Rea  Assistant: Dr. Ruiz    Preop dx: R. breast mass  Postop dx: R. breast mass    Procedure: R. partial mastectomy w/ sentinel lymph node biopsy  Specimen: R. breast mass, sentinel lymph node   EBL: minimal, 5ml    Condition: stable  Dispo: PACU, home

## 2019-01-24 LAB — SURGICAL PATHOLOGY STUDY: SIGNIFICANT CHANGE UP

## 2019-01-27 ENCOUNTER — OUTPATIENT (OUTPATIENT)
Dept: OUTPATIENT SERVICES | Facility: HOSPITAL | Age: 69
LOS: 1 days | End: 2019-01-27
Payer: COMMERCIAL

## 2019-01-27 ENCOUNTER — RESULT REVIEW (OUTPATIENT)
Age: 69
End: 2019-01-27

## 2019-01-27 DIAGNOSIS — Z98.890 OTHER SPECIFIED POSTPROCEDURAL STATES: Chronic | ICD-10-CM

## 2019-01-27 DIAGNOSIS — Z85.828 PERSONAL HISTORY OF OTHER MALIGNANT NEOPLASM OF SKIN: Chronic | ICD-10-CM

## 2019-01-27 DIAGNOSIS — Z90.89 ACQUIRED ABSENCE OF OTHER ORGANS: Chronic | ICD-10-CM

## 2019-01-27 DIAGNOSIS — Z96.653 PRESENCE OF ARTIFICIAL KNEE JOINT, BILATERAL: Chronic | ICD-10-CM

## 2019-01-27 PROCEDURE — 88363 XM ARCHIVE TISSUE MOLEC ANAL: CPT

## 2019-01-28 ENCOUNTER — APPOINTMENT (OUTPATIENT)
Dept: SURGERY | Facility: CLINIC | Age: 69
End: 2019-01-28
Payer: COMMERCIAL

## 2019-01-28 LAB — SURGICAL PATHOLOGY STUDY: SIGNIFICANT CHANGE UP

## 2019-01-28 PROCEDURE — 99024 POSTOP FOLLOW-UP VISIT: CPT

## 2019-02-04 ENCOUNTER — OUTPATIENT (OUTPATIENT)
Dept: OUTPATIENT SERVICES | Facility: HOSPITAL | Age: 69
LOS: 1 days | Discharge: HOME | End: 2019-02-04
Payer: COMMERCIAL

## 2019-02-04 DIAGNOSIS — Z90.89 ACQUIRED ABSENCE OF OTHER ORGANS: Chronic | ICD-10-CM

## 2019-02-04 DIAGNOSIS — Z96.653 PRESENCE OF ARTIFICIAL KNEE JOINT, BILATERAL: Chronic | ICD-10-CM

## 2019-02-04 DIAGNOSIS — Z85.828 PERSONAL HISTORY OF OTHER MALIGNANT NEOPLASM OF SKIN: Chronic | ICD-10-CM

## 2019-02-04 DIAGNOSIS — Z98.890 OTHER SPECIFIED POSTPROCEDURAL STATES: Chronic | ICD-10-CM

## 2019-02-06 ENCOUNTER — OUTPATIENT (OUTPATIENT)
Dept: OUTPATIENT SERVICES | Facility: HOSPITAL | Age: 69
LOS: 1 days | Discharge: HOME | End: 2019-02-06

## 2019-02-06 DIAGNOSIS — C50.919 MALIGNANT NEOPLASM OF UNSPECIFIED SITE OF UNSPECIFIED FEMALE BREAST: ICD-10-CM

## 2019-02-06 DIAGNOSIS — Z98.890 OTHER SPECIFIED POSTPROCEDURAL STATES: Chronic | ICD-10-CM

## 2019-02-06 DIAGNOSIS — Z90.89 ACQUIRED ABSENCE OF OTHER ORGANS: Chronic | ICD-10-CM

## 2019-02-06 DIAGNOSIS — Z85.828 PERSONAL HISTORY OF OTHER MALIGNANT NEOPLASM OF SKIN: Chronic | ICD-10-CM

## 2019-02-06 DIAGNOSIS — Z96.653 PRESENCE OF ARTIFICIAL KNEE JOINT, BILATERAL: Chronic | ICD-10-CM

## 2019-02-11 ENCOUNTER — APPOINTMENT (OUTPATIENT)
Dept: SURGERY | Facility: CLINIC | Age: 69
End: 2019-02-11

## 2019-02-11 ENCOUNTER — APPOINTMENT (OUTPATIENT)
Dept: RADIATION ONCOLOGY | Facility: CLINIC | Age: 69
End: 2019-02-11
Payer: COMMERCIAL

## 2019-02-11 VITALS
WEIGHT: 280 LBS | SYSTOLIC BLOOD PRESSURE: 129 MMHG | HEART RATE: 58 BPM | OXYGEN SATURATION: 97 % | BODY MASS INDEX: 43.85 KG/M2 | DIASTOLIC BLOOD PRESSURE: 72 MMHG | RESPIRATION RATE: 16 BRPM

## 2019-02-11 DIAGNOSIS — Z80.3 FAMILY HISTORY OF MALIGNANT NEOPLASM OF BREAST: ICD-10-CM

## 2019-02-11 PROCEDURE — 77263 THER RADIOLOGY TX PLNG CPLX: CPT

## 2019-02-11 PROCEDURE — 99204 OFFICE O/P NEW MOD 45 MIN: CPT | Mod: GC,25

## 2019-02-11 RX ORDER — METOCLOPRAMIDE 10 MG/1
10 TABLET ORAL
Refills: 0 | Status: DISCONTINUED | COMMUNITY
End: 2019-02-11

## 2019-02-11 RX ORDER — DOXYCYCLINE 40 MG/1
40 CAPSULE ORAL
Refills: 0 | Status: DISCONTINUED | COMMUNITY
End: 2019-02-11

## 2019-02-11 NOTE — OB/GYN HISTORY
[Definite:  ___ (Date)] : the last menstrual period was [unfilled] [History of Birth Control Pills] : Patient has a history of taking birth control pills [History of Hormone Replacement Therapy] : a history of hormone replacement therapy [___] : Total Pregnancies: [unfilled]

## 2019-02-13 ENCOUNTER — APPOINTMENT (OUTPATIENT)
Dept: HEMATOLOGY ONCOLOGY | Facility: CLINIC | Age: 69
End: 2019-02-13
Payer: COMMERCIAL

## 2019-02-13 VITALS
OXYGEN SATURATION: 97 % | WEIGHT: 282.19 LBS | DIASTOLIC BLOOD PRESSURE: 66 MMHG | BODY MASS INDEX: 45.9 KG/M2 | TEMPERATURE: 98.6 F | RESPIRATION RATE: 16 BRPM | SYSTOLIC BLOOD PRESSURE: 97 MMHG | HEART RATE: 52 BPM | HEIGHT: 65.87 IN

## 2019-02-13 VITALS — SYSTOLIC BLOOD PRESSURE: 129 MMHG | DIASTOLIC BLOOD PRESSURE: 74 MMHG

## 2019-02-13 PROCEDURE — 99205 OFFICE O/P NEW HI 60 MIN: CPT

## 2019-02-13 RX ORDER — DOXYCYCLINE 100 MG/1
100 TABLET, FILM COATED ORAL
Qty: 28 | Refills: 0 | Status: DISCONTINUED | COMMUNITY
Start: 2018-08-13

## 2019-02-13 RX ORDER — CLOBETASOL PROPIONATE 0.5 MG/G
0.05 CREAM TOPICAL
Qty: 60 | Refills: 0 | Status: ACTIVE | COMMUNITY
Start: 2019-01-07

## 2019-02-13 RX ORDER — CHROMIUM 200 MCG
TABLET ORAL
Refills: 0 | Status: ACTIVE | COMMUNITY

## 2019-02-13 RX ORDER — AMOXICILLIN 500 MG/1
500 CAPSULE ORAL
Qty: 4 | Refills: 0 | Status: DISCONTINUED | COMMUNITY
Start: 2018-02-01

## 2019-02-13 RX ORDER — ERENUMAB-AOOE 70 MG/ML
70 INJECTION SUBCUTANEOUS
Qty: 1 | Refills: 0 | Status: DISCONTINUED | COMMUNITY
Start: 2018-09-11 | End: 2019-02-13

## 2019-02-13 NOTE — DISEASE MANAGEMENT
[Pathological] : TNM Stage: p [IA] : IA [FreeTextEntry4] : Right breast [TTNM] : 1b [NTNM] : 0 [MTNM] : 0

## 2019-02-13 NOTE — HISTORY OF PRESENT ILLNESS
[FreeTextEntry1] : Ms. Ching is a 68 year old woman with recently diagnosed Stage IA, pT1bN0 well differentiated invasive ductal carcinoma of the right breast s/p lumpectomy, reexcision and sentinel lymph node biopsy with negative margins and negative sentinel lymph nodes. \par \par She underwent a screening mammogram on 10/12/18 and was found to have questionable architectural distortions. She was called back for a diagnostic mammogram and ultrasound on 10/23/18. This showed 6x5x5 mm hypoechoic mass, 10 o’clock axis, U/S guided biopsy was recommended. She underwent core needle biopsy on 10/26/18, it showed focal atypical duct hyperplasia (ADH) with microcalcifications adjacent to benign breast tissue with dense stroma. \par \par She underwent lumpectomy for the right breast mass on 12/18/18, and pathology showed well differentiated invasive ductal carcinoma, ER - >98 positive, MO - 80%  positive and Her2 negative. There was some intermediate grade 1 mm associated DCIS with focal necrosis identified. The margins were <1mm. Lymph nodes were not examined.  There was no LVI present.\par \par She had a breast MRI on 1/4/19 which showed postsurgical change in the right breast, mildly T2 hyperintense enhancing mass in the upper outer right breast, possibly an intramammary lymph node, and no MRI evidence of malignancy in the left breast. She underwent a biopsy of the right breast at 1:00 on 1/10/19, it showed organizing hematoma, fat necrosis and negative for mammary epithelium. \par \par On 1/22/19, she underwent superior, medial, inferior, lateral margin excision and two right sentinel lymph node biopsies. There was no cancer found in the resected/biopsied tissues. Oncotype Dx recurrence score is a 10. \par \par Symptomatically, she is having a lot of swelling at area of the axillary biopsy. It is thought to be infected - she is on antibiotics. Currently taking Keflex. 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 Another daughter in collage\par Smoking: None\par Alcohol: Occasional\par Other Drugs: None\par

## 2019-02-13 NOTE — ASSESSMENT
[FreeTextEntry1] : 68 year old woman with OA/RA, Sjogrens on plaquenil, hypothyroidism, HLD, migraines, Smiley's syndrome, depression, asthma, recently diagnosed Stage IA, pT1bN0 well-differentiated invasive ductal carcinoma of the right breast s/p lumpectomy, reexcision and sentinel lymph node biopsy with negative margins and negative sentinel lymph nodes. Oncotype Dx score 10. She is doing well post-operatively with minimal complaints; R axillary infection/seroma improving on keflex.\par \par We discussed the good prognosis of her early stage, hormone receptor positive, HER2-negative, lymph node-negative breast cancer. When cancer is confined to the breast or axillary lymph nodes it is potentially curable.  When cancer is detected in distant organs or bone, it is treatable but not curable. I explained that even when a carcinoma has been completely removed by surgery, microscopic cells can escape into the circulation and lymphatic system, seed, and grow into clinically significant metastases that are then incurable. The role of adjuvant chemotherapy and hormonal therapies are to reduce this risk. We discussed the role of the Oncotype Dx test, which was developed on preserved tumor specimens from a randomized trial of women with hormone receptor positive breast cancer who had received tamoxifen alone or tamoxifen plus chemotherapy after surgery. A tumor gene profile was identified which correlated with risk of metastatic recurrence in patients who received hormone therapy alone. It was found that women whose gene panel predicted a high risk of metastatic recurrence experienced a significant benefit from the addition of adjuvant chemotherapy to endocrine therapy. Patients who score predicted a low risk of metastatic recurrence did not benefit from chemotherapy. The multicenter [employer] study randomized thousands of patients with intermediate risk scores (11-25) to chemo-hormonal therapy vs. hormone therapy alone and found that, for the majority of patients, the addition of adjuvant chemotherapy to hormonal therapies did not provide a significant improvement in distant disease recurrence rates as compared to hormonal therapy alone. Ms. Ching's OncotypeDX score of 10 indicates a low risk of distant disease recurrence at 9 years (3% for tamoxifen or AI alone, and low benefit for the addition of chemotherapy to adjuvant hormonal therapy (<1% benefit).\par \par We reviewed my recommendation for adjuvant medical treatment of ER+/SC+ invasive breast cancer with anastrozole (or arimidex, and aromatase inhibitor), which is a pill taken daily by mouth for 5 years as tolerated in order to reduce the risk of breast cancer recurrence (locally or distant/incurable recurrence) and to reduce the risk of developing a new invasive breast cancer bilaterally in the future. We reviewed the side effects of arimidex including but not limited to hot flashes, hair thinning, vaginal dryness or discharge, bone loss (which can lead to osteoporosis and fractures), potential cardiovascular risk, and joint pains/aches (which may worsen her baseline symptoms). She will have DEXA monitoring periodically while on treatment; she will follow up next month with her gynecologist for this. She will begin calcium (1200mg PO daily) and continue vitamin D supplementation (1000mg PO daily) for bone strength. She was counseled on the importance of exercise 30 minutes per day, 5x per week. All of the patient's questions were answered and she is in agreement with this plan. I will e-prescribe arimidex to her pharmacy. She is planned for radiation 4240Gy to right breast; arimidex therapy is to begin after radiation treatment is completed. She will follow up with me in 4 months, sooner if issues arise. She will continue follow up with her surgeon Dr. Rea for ongoing surveillance imaging and exams, as planned (including follow up this week for seroma and axillary infection). She was also given contact information for genetic counseling given her significant family history.

## 2019-02-13 NOTE — LETTER CLOSING
[Consult Closing:] : Thank you for allowing me to participate in the care of this patient.  If you have any questions, please do not hesitate to contact me. [Sincerely yours,] : Sincerely yours, [FreeTextEntry3] : Spring Isabel MD\par \par

## 2019-02-13 NOTE — VITALS
[Maximal Pain Intensity: 4/10] : 4/10 [Least Pain Intensity: 0/10] : 0/10 [Pain Description/Quality: ___] : Pain description/quality: [unfilled] [Pain Duration: ___] : Pain duration: [unfilled] [Pain Location: ___] : Pain Location: [unfilled] [Pain Interferes with ADLs] : Pain interferes with activities of daily living. [NoTreatment Scheduled] : no treatment scheduled [Date: ____________] : Patient's last distress assessment performed on [unfilled]. [7 - Distress Level] : Distress Level: 7 [80: Normal activity with effort; some signs or symptoms of disease.] : 80: Normal activity with effort; some signs or symptoms of disease.  [ECOG Performance Status: 1 - Restricted in physically strenuous activity but ambulatory and able to carry out work of a light or sedentary nature] : Performance Status: 1 - Restricted in physically strenuous activity but ambulatory and able to carry out work of a light or sedentary nature, e.g., light house work, office work

## 2019-02-13 NOTE — HISTORY OF PRESENT ILLNESS
[Disease: _____________________] : Disease: [unfilled] [T: ___] : T[unfilled] [N: ___] : N[unfilled] [AJCC Stage: ____] : AJCC Stage: [unfilled] [de-identified] : 67yo woman presenting for medical oncology consultation.\par \par She underwent a screening mammogram on 10/12/18 and was found to have questionable architectural distortions. She was called back for a diagnostic mammogram on 10/23/18. It showed [id number] hypoechoic mass at 10 o’clock axis. She underwent core needle biopsy on 10/26/18 which revealed focal atypical duct hyperplasia  with microcalcifications adjacent to benign breast tissue with dense stroma. \par \par She underwent a lumpectomy of the right breast on 12/18/18: well-differentiated invasive ductal carcinoma, 6mm, ER 98% strong, CT 80% moderate to strong, Her2 negative (IHC 0), also with intermediate grade 1mm associated DCIS with focal necrosis identified. Inked margins were uninvolved, however there was invasive cancer present <1 mm from the margin. Lymph nodes were not examined. No LVI present. T1bN0\par \par She had a breast MRI on 1/4/19:\par \par 1.  Postsurgical change in the right breast, as above.\par 2.  Mildly T2 hyperintense enhancing mass in the upper outer right breast, possibly an intramammary lymph node. Second Look ultrasound with possible biopsy is recommended.\par 3.  No MRI evidence of malignancy in the left breast.\par \par 1/10/19 US guided biopsy:\par Breast MRI 1/3/2019 revealed a 7-8 mm indeterminate enhancing mass in the medial breast adjacent to a postoperative hematoma/seroma. Subsequent targeted ultrasound demonstrated a 5 x 4 x 4 mm hypoechoic mass with angular margins at 1:00, areolar margin, adjacent to the postoperative collection. In accordance with this finding, sampling the lesion by fine-needle aspiration was performed, with subsequent ultrasound core needle biopsy. \par Under ultrasound guidance, in a lateral approach, an 18-gauge needle was introduced into the lesion, which decreased in size but did not completely resolve. Subsequently, a 12-gauge core needle was used to obtain multiple core specimens of the target at 1:00, areolar margin.\par Core biopsy: organizing hematoma, fat necrosis and negative for mammary epithelium\par \par FNA: negative for malignant cells. \par \par 1/22/19 superior, medial, inferior, lateral margin excision and two right sentinel lymph node biopsies: there was no further cancer identified.\par \par She is on antibiotics for a presumed right axillary cellulitis with seroma - swelling, erythema, and pain has improved and arm is more mobile since. She is no longer taking any pain medication. She is otherwise doing well - patient denies fatigue, headache, vision changes, weakness, fevers, chills, night sweats, dizziness, LOC, cough, chest pain, shortness of breath, palpitations, decreased exercise tolerance, nausea, vomiting abdominal pain, diarrhea, constipation, dysuria, back pain, edema, new rashes, bleeding or bruising, new or focal neuro symptoms, weight loss or gain.\par \par Pertinent History:\par She is  with two daughters (adopted), lives locally. She grew up in [place]. 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 LMP in early 90s, was on OCPs in college. \par FH maternal aunt (in 40s) and paternal aunt (in 80s) with breast cancer. both parents with melanomas (mom 65, father 75). She has two sisters in good health.\par OA/RA and Sjogrens: for many years. +RF, has had R TKR, on plaquenil, recently on celebrex, stopped perioperatively with stable symptoms so has not resumed. Rhematologist Dr. 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 Mcpherson cardiology (initially for peripheral edema), also on statin for HLD\par Asthma on symbicort daily\par Depression on lexapro\par Psoriasis - uses topical steroids PRN\par GERD\par PMD Dr. Azar, Gyn Dr. Vega (annual exam), last DEXA normal bone density almost 2 years ago. C-scope 8-9 years ago OK\par Mohs surgery for BCC x 2 in past [de-identified] : ER+PA+HER2-

## 2019-02-13 NOTE — REVIEW OF SYSTEMS
[Fatigue] : fatigue [Lower Ext Edema] : lower extremity edema [SOB on Exertion] : shortness of breath during exertion [Muscle Weakness] : muscle weakness [Gait Disturbance] : gait disturbance [Skin Wound] : skin wound [Negative] : Allergic/Immunologic [FreeTextEntry9] : brace on r ankle

## 2019-02-13 NOTE — PHYSICAL EXAM
[Supraclavicular Lymph Nodes Enlarged Bilaterally] : supraclavicular [No Focal Deficits] : no focal deficits [Breast Abnormal Lactation (Galactorrhea)] : no nipple discharge [No UE Edema] : there is no upper extremity edema [Normal] : no joint swelling, no clubbing or cyanosis of the fingernails and muscle strength and tone were normal [de-identified] : Right axilla swollen with erythema around the surgical scar, periareolar lumpectomy scar is clean and dry

## 2019-02-13 NOTE — PHYSICAL EXAM
[Normal] : affect appropriate [Restricted in physically strenuous activity but ambulatory and able to carry out work of a light or sedentary nature] : Status 1- Restricted in physically strenuous activity but ambulatory and able to carry out work of a light or sedentary nature, e.g., light house work, office work [Obese] : obese [de-identified] : right breast lumpectomy scar and SLB well healing, large ~10cm round area of underlying seroma R axilla nontender, some scar tissue/thickening 12'o clock lumpectomy site, some edema noted no drainage, no masses or adenopathy b/l [de-identified] : scattered moles, seborrheic keratosis, many. chest wall over sternum with 2 prior Mohs scars.

## 2019-03-01 PROCEDURE — 77290 THER RAD SIMULAJ FIELD CPLX: CPT | Mod: 26

## 2019-03-01 PROCEDURE — 77334 RADIATION TREATMENT AID(S): CPT | Mod: 26

## 2019-03-12 PROCEDURE — 77334 RADIATION TREATMENT AID(S): CPT | Mod: 26

## 2019-03-12 PROCEDURE — 77300 RADIATION THERAPY DOSE PLAN: CPT | Mod: 26

## 2019-03-12 PROCEDURE — 77295 3-D RADIOTHERAPY PLAN: CPT | Mod: 26

## 2019-03-14 ENCOUNTER — TRANSCRIPTION ENCOUNTER (OUTPATIENT)
Age: 69
End: 2019-03-14

## 2019-03-14 PROCEDURE — 77280 THER RAD SIMULAJ FIELD SMPL: CPT | Mod: 26

## 2019-03-21 PROCEDURE — 77427 RADIATION TX MANAGEMENT X5: CPT

## 2019-03-21 NOTE — HISTORY OF PRESENT ILLNESS
[FreeTextEntry1] : Ms. Ching is a 68 year old woman with Stage IA, pT1bN0 well differentiated invasive ductal carcinoma of the right breast s/p lumpectomy, reexcision and sentinel lymph node biopsy with negative margins and negative sentinel lymph nodes. She is currently receiving adjuvant radiation therapy. \par   \par She is feeling generally well. She is mildly fatigued but remains busy and active. She has occasional mild pain in the breast but does not require analgesics. She developed a pruritic rash between her breasts, for which she started to use a prescription steroid cream with much improvement. Otherwise, she is using Miaderm and Cetaphil on the skin.\par \par \par \par

## 2019-03-21 NOTE — PHYSICAL EXAM
[Normal] : well developed, well nourished, in no acute distress [de-identified] : Right lumpectomy and axillary scars are well healed, no erythema, no hyperpigmentation, scattered keratotic erythematous papular rash between breasts

## 2019-03-21 NOTE — DISEASE MANAGEMENT
[Pathological] : TNM Stage: p [IA] : IA [TTNM] : 1b [NTNM] : 0 [MTNM] : 0 [de-identified] : 1325 cGy [de-identified] : 7067 cGy [de-identified] : Right Breast

## 2019-03-21 NOTE — REVIEW OF SYSTEMS
[Alopecia: Grade 0] : Alopecia: Grade 0 [Dermatitis Radiation: Grade 1 - Faint erythema or dry desquamation] : Dermatitis Radiation: Grade 1 - Faint erythema or dry desquamation [Pruritus: Grade 1 - Mild or localized; topical intervention indicated] : Pruritus: Grade 1 - Mild or localized; topical intervention indicated [Skin Atrophy: Grade 0] : Skin Atrophy: Grade 0 [Skin Hyperpigmentation: Grade 0] : Skin Hyperpigmentation: Grade 0 [Skin Induration: Grade 0] : Skin Induration: Grade 0

## 2019-03-29 PROCEDURE — 77427 RADIATION TX MANAGEMENT X5: CPT

## 2019-03-29 NOTE — REVIEW OF SYSTEMS
[Alopecia: Grade 0] : Alopecia: Grade 0 [Pruritus: Grade 1 - Mild or localized; topical intervention indicated] : Pruritus: Grade 1 - Mild or localized; topical intervention indicated [Skin Atrophy: Grade 0] : Skin Atrophy: Grade 0 [Skin Hyperpigmentation: Grade 0] : Skin Hyperpigmentation: Grade 0 [Skin Induration: Grade 0] : Skin Induration: Grade 0 [Dermatitis Radiation: Grade 1 - Faint erythema or dry desquamation] : Dermatitis Radiation: Grade 1 - Faint erythema or dry desquamation

## 2019-03-31 NOTE — HISTORY OF PRESENT ILLNESS
[FreeTextEntry1] : Ms. Ching is a 68 year old woman with Stage IA, pT1bN0 well differentiated invasive ductal carcinoma of the right breast s/p lumpectomy, reexcision and sentinel lymph node biopsy with negative margins and negative sentinel lymph nodes. She is currently receiving adjuvant radiation therapy. \par   \par She is feeling generally well. She is mildly fatigued but remains busy and active. She has occasional mild pain in the breast but does not require analgesics. She developed a pruritic rash between her breasts, for which she started to use a prescription steroid cream with much improvement. Otherwise, she is using Miaderm and Cetaphil on the skin. \par \par \par \par

## 2019-03-31 NOTE — PHYSICAL EXAM
[Normal] : well developed, well nourished, in no acute distress [de-identified] : Right lumpectomy and axillary scars are well healed, no erythema, no hyperpigmentation, scattered keratotic erythematous papular rash between breasts

## 2019-03-31 NOTE — DISEASE MANAGEMENT
[Pathological] : TNM Stage: p [IA] : IA [TTNM] : 1b [NTNM] : 0 [MTNM] : 0 [de-identified] : 0774 6182 cGy [de-identified] : 3178 cGy [de-identified] : Right Breast

## 2019-04-04 NOTE — HISTORY OF PRESENT ILLNESS
[FreeTextEntry1] : Ms. Ching is a 68 year old woman with Stage IA, pT1bN0 well differentiated invasive ductal carcinoma of the right breast s/p lumpectomy, reexcision and sentinel lymph node biopsy with negative margins and negative sentinel lymph nodes. She is currently receiving adjuvant radiation therapy. \par   \par She is feeling generally well. She reports occasional mild pain in the breast but does not require analgesics. She is using clobetasol for  pruritic rash between her breasts,  Otherwise, she is using Miaderm and Cetaphil on the breast. Left axilla with mild swelling and as per patient it had gone down after treatment for infection. \par \par \par \par

## 2019-04-04 NOTE — PHYSICAL EXAM
[Normal] : well developed, well nourished, in no acute distress [de-identified] : Right lumpectomy and axillary scars are well healed, moderate erythema and hyperpigmentation, scattered erythematous papular rash between breasts

## 2019-04-04 NOTE — DISEASE MANAGEMENT
[Pathological] : TNM Stage: p [IA] : IA [TTNM] : 1b [NTNM] : 0 [MTNM] : 0 [de-identified] : 3975 cGy [de-identified] : 1787 cGy [de-identified] : Right Breast

## 2019-04-04 NOTE — VITALS
[Maximal Pain Intensity: 4/10] : 4/10 [Least Pain Intensity: 0/10] : 0/10 [Pain Duration: ___] : Pain duration: [unfilled] [Pain Location: ___] : Pain Location: [unfilled] [ECOG Performance Status: 0 - Fully active, able to carry on all pre-disease performance without restriction] : Performance Status: 0 - Fully active, able to carry on all pre-disease performance without restriction [Pain Interferes with ADLs] : Pain does not interfere with activities of daily living [80: Normal activity with effort; some signs or symptoms of disease.] : 80: Normal activity with effort; some signs or symptoms of disease.

## 2019-04-05 PROCEDURE — 77427 RADIATION TX MANAGEMENT X5: CPT

## 2019-04-09 ENCOUNTER — APPOINTMENT (OUTPATIENT)
Dept: RADIATION ONCOLOGY | Facility: CLINIC | Age: 69
End: 2019-04-09
Payer: COMMERCIAL

## 2019-04-09 VITALS
HEART RATE: 52 BPM | DIASTOLIC BLOOD PRESSURE: 74 MMHG | TEMPERATURE: 36.9 F | OXYGEN SATURATION: 52 % | SYSTOLIC BLOOD PRESSURE: 130 MMHG | RESPIRATION RATE: 16 BRPM

## 2019-04-09 DIAGNOSIS — T81.49XA INFECTION FOLLOWING A PROCEDURE, OTHER SURGICAL SITE, INITIAL ENCOUNTER: ICD-10-CM

## 2019-04-09 PROCEDURE — 99024 POSTOP FOLLOW-UP VISIT: CPT

## 2019-04-09 NOTE — LETTER GREETING
[Follow-Up] : Your patient, [unfilled] was seen in my office today for follow-up [Dear  ___] : Dear  [unfilled], [Please see my note below.] : Please see my note below.

## 2019-04-17 ENCOUNTER — APPOINTMENT (OUTPATIENT)
Dept: SURGERY | Facility: CLINIC | Age: 69
End: 2019-04-17

## 2019-04-17 ENCOUNTER — APPOINTMENT (OUTPATIENT)
Dept: SURGERY | Facility: CLINIC | Age: 69
End: 2019-04-17
Payer: COMMERCIAL

## 2019-04-17 PROCEDURE — 99024 POSTOP FOLLOW-UP VISIT: CPT

## 2019-04-22 ENCOUNTER — APPOINTMENT (OUTPATIENT)
Dept: SURGERY | Facility: CLINIC | Age: 69
End: 2019-04-22
Payer: COMMERCIAL

## 2019-04-22 PROCEDURE — 99024 POSTOP FOLLOW-UP VISIT: CPT

## 2019-04-23 NOTE — PHYSICAL EXAM
[Sclera] : the sclera and conjunctiva were normal [Normal] : well developed, well nourished, in no acute distress [Heart Rate And Rhythm] : heart rate and rhythm were normal [] : no respiratory distress [de-identified] : seroma underlying axillary scar several cm in size, overlying skin with bright erythema surrounding seroma extending outside RT portal, tender, no discharge [de-identified] : remaining skin reaction on breast is a mild erythema with scattered rash, no moist desquamation

## 2019-04-23 NOTE — HISTORY OF PRESENT ILLNESS
[FreeTextEntry1] : Ms. Ching is a 68 year old woman with Stage IA, pT1bN0 well differentiated invasive ductal carcinoma of the right breast s/p lumpectomy, reexcision and sentinel lymph node biopsy with negative margins and negative sentinel lymph nodes. She underwent adjuvant radiation therapy to the right breast. She received a dose of 4240cGy, completed on 4/5/19. She post-treatment evaluated is scheduled for 5/2/19, but she came today because she is concerned about possible infection. \par \par Since last Friday, she started to feel increasing swelling and pain in the seroma involving the axilla. The area became more red, tender and warm. The seroma had been stable during the RT, even decreasing a little bit. She also developed fever and chills on and off over the weekend, temperature as high as 101F yesterday. She was also feeling nauseous. She took Tylenol today and is currently without fever. \par

## 2019-05-06 NOTE — H&P PST ADULT - DOCUMENT STATUS
Additional Notes: Read all labels to avoid contact allergies Authored by Resident/PA/NP Detail Level: Simple

## 2019-05-15 ENCOUNTER — APPOINTMENT (OUTPATIENT)
Dept: RADIATION ONCOLOGY | Facility: CLINIC | Age: 69
End: 2019-05-15
Payer: COMMERCIAL

## 2019-05-15 VITALS
HEIGHT: 65.8 IN | OXYGEN SATURATION: 96 % | HEART RATE: 57 BPM | SYSTOLIC BLOOD PRESSURE: 137 MMHG | DIASTOLIC BLOOD PRESSURE: 80 MMHG | WEIGHT: 281.07 LBS | RESPIRATION RATE: 16 BRPM | TEMPERATURE: 98.24 F | BODY MASS INDEX: 45.71 KG/M2

## 2019-05-15 PROCEDURE — 99024 POSTOP FOLLOW-UP VISIT: CPT

## 2019-05-17 DIAGNOSIS — F32.9 MAJOR DEPRESSIVE DISORDER, SINGLE EPISODE, UNSPECIFIED: ICD-10-CM

## 2019-05-17 DIAGNOSIS — L40.9 PSORIASIS, UNSPECIFIED: ICD-10-CM

## 2019-05-18 NOTE — PHYSICAL EXAM
[] : no respiratory distress [Normal] : no palpable adenopathy [Heart Rate And Rhythm] : heart rate and rhythm were normal [Supraclavicular Lymph Nodes Enlarged Bilaterally] : supraclavicular [Axillary Lymph Nodes Enlarged Bilaterally] : axillary [de-identified] : right breast incision is closed and the erythema has resolved, areas of residual hyperpigmentation in axilla and inframammary fold

## 2019-05-18 NOTE — LETTER CLOSING
[Consult Closing:] : Thank you for allowing me to participate in the care of this patient.  If you have any questions, please do not hesitate to contact me. [Sincerely yours,] : Sincerely yours, [FreeTextEntry3] : Srping Isabel MD\par \par

## 2019-05-18 NOTE — LETTER GREETING
[Please see my note below.] : Please see my note below. [Dear  ___] : Dear  [unfilled], [Follow-Up] : Your patient, [unfilled] was seen in my office today for follow-up

## 2019-05-18 NOTE — LETTER GREETING
[Follow-Up] : Your patient, [unfilled] was seen in my office today for follow-up [Dear  ___] : Dear  [unfilled], [Please see my note below.] : Please see my note below. 1

## 2019-05-18 NOTE — HISTORY OF PRESENT ILLNESS
[FreeTextEntry1] : Ms. Ching is a 68 year old woman with Stage IA, pT1bN0 well differentiated invasive ductal carcinoma of the right breast s/p lumpectomy, reexcision and sentinel lymph node biopsy with negative margins and negative sentinel lymph nodes. She underwent adjuvant radiation therapy to the right breast. She received a dose of 4240cGy, completed on 4/5/19. She tolerated the treatment well.  A few days later, she developed an infection in the axillary region with fever. She was treated with antibiotics and I&D. She returns today for a post treatment evaluation.\par \par The patient reports feeling generally well and much better. The infection and fever has resolved. She is involved in her usual activities. She has occasional fatigue but it seems to be improving. She denies cough or shortness of breath.  There has been some improvement in the skin since completing radiation therapy, some areas are still dark. She continues regular skin care. She does not require analgesics. She started anastrozole and is tolerating it well.\par

## 2019-05-18 NOTE — REVIEW OF SYSTEMS
[Dermatitis Radiation: Grade 0] : Dermatitis Radiation: Grade 0 [Pruritus: Grade 0] : Pruritus: Grade 0 [Alopecia: Grade 0] : Alopecia: Grade 0 [Skin Atrophy: Grade 0] : Skin Atrophy: Grade 0 [Skin Hyperpigmentation: Grade 1 - Hyperpigmentation covering <10% BSA; no psychosocial impact] : Skin Hyperpigmentation: Grade 1 - Hyperpigmentation covering <10% BSA; no psychosocial impact [Skin Induration: Grade 0] : Skin Induration: Grade 0

## 2019-05-18 NOTE — PHYSICAL EXAM
[] : no respiratory distress [Supraclavicular Lymph Nodes Enlarged Bilaterally] : supraclavicular [Normal] : no palpable adenopathy [Heart Rate And Rhythm] : heart rate and rhythm were normal [Axillary Lymph Nodes Enlarged Bilaterally] : axillary [de-identified] : right breast incision is closed and the erythema has resolved, areas of residual hyperpigmentation in axilla and inframammary fold

## 2019-06-24 ENCOUNTER — OUTPATIENT (OUTPATIENT)
Dept: OUTPATIENT SERVICES | Facility: HOSPITAL | Age: 69
LOS: 1 days | Discharge: HOME | End: 2019-06-24

## 2019-06-24 DIAGNOSIS — Z96.653 PRESENCE OF ARTIFICIAL KNEE JOINT, BILATERAL: Chronic | ICD-10-CM

## 2019-06-24 DIAGNOSIS — Z90.89 ACQUIRED ABSENCE OF OTHER ORGANS: Chronic | ICD-10-CM

## 2019-06-24 DIAGNOSIS — C50.919 MALIGNANT NEOPLASM OF UNSPECIFIED SITE OF UNSPECIFIED FEMALE BREAST: ICD-10-CM

## 2019-06-24 DIAGNOSIS — Z98.890 OTHER SPECIFIED POSTPROCEDURAL STATES: Chronic | ICD-10-CM

## 2019-06-24 DIAGNOSIS — Z85.828 PERSONAL HISTORY OF OTHER MALIGNANT NEOPLASM OF SKIN: Chronic | ICD-10-CM

## 2019-06-28 ENCOUNTER — APPOINTMENT (OUTPATIENT)
Dept: HEMATOLOGY ONCOLOGY | Facility: CLINIC | Age: 69
End: 2019-06-28
Payer: COMMERCIAL

## 2019-06-28 VITALS
TEMPERATURE: 209.12 F | BODY MASS INDEX: 46.36 KG/M2 | RESPIRATION RATE: 16 BRPM | WEIGHT: 285.49 LBS | DIASTOLIC BLOOD PRESSURE: 79 MMHG | HEART RATE: 65 BPM | SYSTOLIC BLOOD PRESSURE: 146 MMHG | OXYGEN SATURATION: 95 %

## 2019-06-28 PROCEDURE — 99213 OFFICE O/P EST LOW 20 MIN: CPT

## 2019-07-01 NOTE — HISTORY OF PRESENT ILLNESS
[Disease: _____________________] : Disease: [unfilled] [T: ___] : T[unfilled] [AJCC Stage: ____] : AJCC Stage: [unfilled] [N: ___] : N[unfilled] [de-identified] : 69yo woman presenting for medical oncology consultation.\par \par She underwent a screening mammogram on 10/12/18 and was found to have questionable architectural distortions. She was called back for a diagnostic mammogram on 10/23/18. It showed [id number] hypoechoic mass at 10 o’clock axis. She underwent core needle biopsy on 10/26/18 which revealed focal atypical duct hyperplasia  with microcalcifications adjacent to benign breast tissue with dense stroma. \par \par She underwent a lumpectomy of the right breast on 12/18/18: well-differentiated invasive ductal carcinoma, 6mm, ER 98% strong, NE 80% moderate to strong, Her2 negative (IHC 0), also with intermediate grade 1mm associated DCIS with focal necrosis identified. Inked margins were uninvolved, however there was invasive cancer present <1 mm from the margin. Lymph nodes were not examined. No LVI present. T1bN0\par \par She had a breast MRI on 1/4/19:\par \par 1.  Postsurgical change in the right breast, as above.\par 2.  Mildly T2 hyperintense enhancing mass in the upper outer right breast, possibly an intramammary lymph node. Second Look ultrasound with possible biopsy is recommended.\par 3.  No MRI evidence of malignancy in the left breast.\par \par 1/10/19 US guided biopsy:\par Breast MRI 1/3/2019 revealed a 7-8 mm indeterminate enhancing mass in the medial breast adjacent to a postoperative hematoma/seroma. Subsequent targeted ultrasound demonstrated a 5 x 4 x 4 mm hypoechoic mass with angular margins at 1:00, areolar margin, adjacent to the postoperative collection. In accordance with this finding, sampling the lesion by fine-needle aspiration was performed, with subsequent ultrasound core needle biopsy. \par Under ultrasound guidance, in a lateral approach, an 18-gauge needle was introduced into the lesion, which decreased in size but did not completely resolve. Subsequently, a 12-gauge core needle was used to obtain multiple core specimens of the target at 1:00, areolar margin.\par Core biopsy: organizing hematoma, fat necrosis and negative for mammary epithelium\par \par FNA: negative for malignant cells. \par \par 1/22/19 superior, medial, inferior, lateral margin excision and two right sentinel lymph node biopsies: there was no further cancer identified.\par \par She took antibiotics for a presumed right axillary cellulitis with seroma. She completed adjuvant radiation and started anastrozole May 2019.\par \par Pertinent History:\par She is  with two daughters (adopted), lives locally. She grew up in [place]. 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 \par FH maternal aunt (in 40s) and paternal aunt (in 80s) with breast cancer. both parents with melanomas (mom 65, father 75). She has two sisters in good health.\par OA/RA and Sjogrens: for many years. +RF, has had R TKR, on plaquenil, recently on celebrex, stopped perioperatively with stable symptoms so has not resumed. Rhematologist Dr. 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 Mcpherson cardiology (initially for peripheral edema), also on statin for HLD\par Asthma on symbicort daily\par Depression on lexapro\par Psoriasis - uses topical steroids PRN\par GERD\par PMD Dr. Azar, Gyn Dr. Vega (annual exam), last DEXA normal bone density almost 2 years ago. C-scope 8-9 years ago OK\par Mohs surgery for BCC x 2 in past [de-identified] : ER+FL+HER2- [de-identified] : Patient presents today for follow up on arimidex. She is tolerating therapy without significant side effects. Occasional mild knee pains.

## 2019-07-01 NOTE — PHYSICAL EXAM
[Restricted in physically strenuous activity but ambulatory and able to carry out work of a light or sedentary nature] : Status 1- Restricted in physically strenuous activity but ambulatory and able to carry out work of a light or sedentary nature, e.g., light house work, office work [Obese] : obese [Normal] : affect appropriate [de-identified] : right breast lumpectomy scar and SLB well healed, some edema noted no drainage, no masses or adenopathy b/l [de-identified] : scattered moles, seborrheic keratosis, many. chest wall over sternum with 2 prior Mohs scars.

## 2019-07-01 NOTE — ASSESSMENT
[FreeTextEntry1] : 68 year old woman with OA/RA, Sjogrens on plaquenil, hypothyroidism, HLD, migraines, Smiley's syndrome, depression, asthma, recently diagnosed Stage IA, pT1bN0 well-differentiated invasive ductal carcinoma of the right breast s/p lumpectomy, reexcision and sentinel lymph node biopsy with negative margins and negative sentinel lymph nodes (course c/b persistent seroma and cellulitis). Oncotype Dx score 10; she completed adjuvant radiation and started AI 5/2019, doing well.\par \par -continue arimidex therapy daily, tolerating well\par -DEXA 3/2019 OK\par -Continue calcium (1200mg PO daily) and continue vitamin D supplementation (1000mg PO daily) for bone strength\par -She will continue follow up with her surgeon Dr. Rea for ongoing surveillance imaging and exams\par -She was also given contact information for genetic counseling given her significant family history\par -follow up with me 3 months, sooner if issues arise\par -discussed Replens for mild vaginal symptoms, trial

## 2019-07-17 ENCOUNTER — APPOINTMENT (OUTPATIENT)
Dept: SURGERY | Facility: CLINIC | Age: 69
End: 2019-07-17
Payer: COMMERCIAL

## 2019-07-17 PROCEDURE — 99213K: CUSTOM

## 2019-08-27 ENCOUNTER — RX RENEWAL (OUTPATIENT)
Age: 69
End: 2019-08-27

## 2019-09-23 ENCOUNTER — OUTPATIENT (OUTPATIENT)
Dept: OUTPATIENT SERVICES | Facility: HOSPITAL | Age: 69
LOS: 1 days | End: 2019-09-23
Payer: COMMERCIAL

## 2019-09-23 VITALS
TEMPERATURE: 98 F | WEIGHT: 291.45 LBS | DIASTOLIC BLOOD PRESSURE: 80 MMHG | SYSTOLIC BLOOD PRESSURE: 111 MMHG | HEART RATE: 58 BPM | HEIGHT: 65.5 IN | RESPIRATION RATE: 14 BRPM | OXYGEN SATURATION: 97 %

## 2019-09-23 DIAGNOSIS — Z98.890 OTHER SPECIFIED POSTPROCEDURAL STATES: Chronic | ICD-10-CM

## 2019-09-23 DIAGNOSIS — Z01.818 ENCOUNTER FOR OTHER PREPROCEDURAL EXAMINATION: ICD-10-CM

## 2019-09-23 DIAGNOSIS — Z85.828 PERSONAL HISTORY OF OTHER MALIGNANT NEOPLASM OF SKIN: Chronic | ICD-10-CM

## 2019-09-23 DIAGNOSIS — D48.1 NEOPLASM OF UNCERTAIN BEHAVIOR OF CONNECTIVE AND OTHER SOFT TISSUE: ICD-10-CM

## 2019-09-23 DIAGNOSIS — Z96.653 PRESENCE OF ARTIFICIAL KNEE JOINT, BILATERAL: Chronic | ICD-10-CM

## 2019-09-23 DIAGNOSIS — M67.40 GANGLION, UNSPECIFIED SITE: ICD-10-CM

## 2019-09-23 DIAGNOSIS — Z90.89 ACQUIRED ABSENCE OF OTHER ORGANS: Chronic | ICD-10-CM

## 2019-09-23 PROCEDURE — 93010 ELECTROCARDIOGRAM REPORT: CPT | Mod: NC

## 2019-09-23 PROCEDURE — 93005 ELECTROCARDIOGRAM TRACING: CPT

## 2019-09-23 PROCEDURE — 36415 COLL VENOUS BLD VENIPUNCTURE: CPT

## 2019-09-23 PROCEDURE — G0463: CPT

## 2019-09-23 RX ORDER — PRASTERONE 6.5 MG/1
1 INSERT VAGINAL
Qty: 0 | Refills: 0 | DISCHARGE

## 2019-09-23 RX ORDER — LEVOTHYROXINE SODIUM 125 MCG
1 TABLET ORAL
Qty: 0 | Refills: 0 | DISCHARGE

## 2019-09-23 RX ORDER — TOPIRAMATE 25 MG
2 TABLET ORAL
Qty: 0 | Refills: 0 | DISCHARGE

## 2019-09-23 NOTE — H&P PST ADULT - NSICDXPASTSURGICALHX_GEN_ALL_CORE_FT
PAST SURGICAL HISTORY:  H/O lumpectomy 2018 and wide excision on 2019    H/O right breast biopsy 11/2018, abnormal    H/O sinus surgery x4    H/O total knee replacement, bilateral Left 2006  Right 1/2018    History of D&C about 4    History of skin cancer multiple excisions    History of tonsillectomy

## 2019-09-23 NOTE — H&P PST ADULT - NSICDXPASTMEDICALHX_GEN_ALL_CORE_FT
PAST MEDICAL HISTORY:  Asthma     Breast cancer right breast    Depression     Fluid retention in legs     Ganglion cyst Left foot    GERD (gastroesophageal reflux disease)     Smiley syndrome     Hypothyroidism     IBS (irritable bowel syndrome)     Migraine     Mitral regurgitation mild, on echo 2018    Obesity     Osteoarthritis     Other abnormal and inconclusive findings on diagnostic imaging of breast     Psoriasis     Psoriatic arthritis     Rheumatoid arthritis     Sicca syndrome     Skin cancer hx of basal and squamus cell

## 2019-09-23 NOTE — H&P PST ADULT - NSICDXPROBLEM_GEN_ALL_CORE_FT
PROBLEM DIAGNOSES  Problem: Ganglion cyst  Assessment and Plan: Patient schedule for excision of soft tissue from left foot. EKG and medical clearance pending.

## 2019-09-25 ENCOUNTER — OUTPATIENT (OUTPATIENT)
Dept: OUTPATIENT SERVICES | Facility: HOSPITAL | Age: 69
LOS: 1 days | Discharge: HOME | End: 2019-09-25

## 2019-09-25 DIAGNOSIS — Z98.890 OTHER SPECIFIED POSTPROCEDURAL STATES: Chronic | ICD-10-CM

## 2019-09-25 DIAGNOSIS — Z90.89 ACQUIRED ABSENCE OF OTHER ORGANS: Chronic | ICD-10-CM

## 2019-09-25 DIAGNOSIS — Z96.653 PRESENCE OF ARTIFICIAL KNEE JOINT, BILATERAL: Chronic | ICD-10-CM

## 2019-09-25 DIAGNOSIS — Z85.828 PERSONAL HISTORY OF OTHER MALIGNANT NEOPLASM OF SKIN: Chronic | ICD-10-CM

## 2019-09-25 DIAGNOSIS — C50.919 MALIGNANT NEOPLASM OF UNSPECIFIED SITE OF UNSPECIFIED FEMALE BREAST: ICD-10-CM

## 2019-09-25 PROBLEM — R60.0 LOCALIZED EDEMA: Chronic | Status: ACTIVE | Noted: 2019-09-23

## 2019-09-25 PROBLEM — M67.40 GANGLION, UNSPECIFIED SITE: Chronic | Status: ACTIVE | Noted: 2019-09-23

## 2019-09-25 PROBLEM — C44.90 UNSPECIFIED MALIGNANT NEOPLASM OF SKIN, UNSPECIFIED: Chronic | Status: ACTIVE | Noted: 2018-12-10

## 2019-09-25 PROBLEM — L40.50 ARTHROPATHIC PSORIASIS, UNSPECIFIED: Chronic | Status: ACTIVE | Noted: 2019-09-23

## 2019-09-26 ENCOUNTER — APPOINTMENT (OUTPATIENT)
Dept: HEMATOLOGY ONCOLOGY | Facility: CLINIC | Age: 69
End: 2019-09-26
Payer: COMMERCIAL

## 2019-10-02 ENCOUNTER — TRANSCRIPTION ENCOUNTER (OUTPATIENT)
Age: 69
End: 2019-10-02

## 2019-10-03 ENCOUNTER — OUTPATIENT (OUTPATIENT)
Dept: OUTPATIENT SERVICES | Facility: HOSPITAL | Age: 69
LOS: 1 days | End: 2019-10-03
Payer: COMMERCIAL

## 2019-10-03 VITALS
SYSTOLIC BLOOD PRESSURE: 148 MMHG | OXYGEN SATURATION: 98 % | TEMPERATURE: 97 F | RESPIRATION RATE: 16 BRPM | HEART RATE: 52 BPM | DIASTOLIC BLOOD PRESSURE: 68 MMHG

## 2019-10-03 VITALS
TEMPERATURE: 98 F | HEART RATE: 57 BPM | WEIGHT: 291.45 LBS | HEIGHT: 65.5 IN | DIASTOLIC BLOOD PRESSURE: 81 MMHG | OXYGEN SATURATION: 96 % | SYSTOLIC BLOOD PRESSURE: 137 MMHG | RESPIRATION RATE: 16 BRPM

## 2019-10-03 DIAGNOSIS — Z98.890 OTHER SPECIFIED POSTPROCEDURAL STATES: Chronic | ICD-10-CM

## 2019-10-03 DIAGNOSIS — Z85.828 PERSONAL HISTORY OF OTHER MALIGNANT NEOPLASM OF SKIN: Chronic | ICD-10-CM

## 2019-10-03 DIAGNOSIS — Z90.89 ACQUIRED ABSENCE OF OTHER ORGANS: Chronic | ICD-10-CM

## 2019-10-03 DIAGNOSIS — Z96.653 PRESENCE OF ARTIFICIAL KNEE JOINT, BILATERAL: Chronic | ICD-10-CM

## 2019-10-03 DIAGNOSIS — D48.1 NEOPLASM OF UNCERTAIN BEHAVIOR OF CONNECTIVE AND OTHER SOFT TISSUE: ICD-10-CM

## 2019-10-03 RX ORDER — CHOLECALCIFEROL (VITAMIN D3) 125 MCG
1 CAPSULE ORAL
Qty: 0 | Refills: 0 | DISCHARGE

## 2019-10-03 RX ORDER — ASPIRIN/CALCIUM CARB/MAGNESIUM 324 MG
1 TABLET ORAL
Qty: 0 | Refills: 0 | DISCHARGE

## 2019-10-03 RX ORDER — ELETRIPTAN HYDROBROMIDE 20 MG/1
1 TABLET, FILM COATED ORAL
Qty: 0 | Refills: 0 | DISCHARGE

## 2019-10-03 RX ORDER — SODIUM CHLORIDE 9 MG/ML
1000 INJECTION, SOLUTION INTRAVENOUS
Refills: 0 | Status: DISCONTINUED | OUTPATIENT
Start: 2019-10-03 | End: 2019-10-03

## 2019-10-03 RX ORDER — ESCITALOPRAM OXALATE 10 MG/1
1 TABLET, FILM COATED ORAL
Qty: 0 | Refills: 0 | DISCHARGE

## 2019-10-03 RX ORDER — LEVOTHYROXINE SODIUM 125 MCG
1 TABLET ORAL
Qty: 0 | Refills: 0 | DISCHARGE

## 2019-10-03 RX ORDER — PYRIDOXINE HCL (VITAMIN B6) 100 MG
1 TABLET ORAL
Qty: 0 | Refills: 0 | DISCHARGE

## 2019-10-03 RX ORDER — OXYCODONE HYDROCHLORIDE 5 MG/1
5 TABLET ORAL ONCE
Refills: 0 | Status: DISCONTINUED | OUTPATIENT
Start: 2019-10-03 | End: 2019-10-03

## 2019-10-03 RX ORDER — BUDESONIDE AND FORMOTEROL FUMARATE DIHYDRATE 160; 4.5 UG/1; UG/1
2 AEROSOL RESPIRATORY (INHALATION)
Qty: 0 | Refills: 0 | DISCHARGE

## 2019-10-03 RX ORDER — CELECOXIB 200 MG/1
1 CAPSULE ORAL
Qty: 0 | Refills: 0 | DISCHARGE

## 2019-10-03 RX ORDER — ACETAMINOPHEN 500 MG
2 TABLET ORAL
Qty: 0 | Refills: 0 | DISCHARGE

## 2019-10-03 RX ORDER — ASCORBIC ACID 60 MG
0 TABLET,CHEWABLE ORAL
Qty: 0 | Refills: 0 | DISCHARGE

## 2019-10-03 RX ORDER — HYDROXYCHLOROQUINE SULFATE 200 MG
1 TABLET ORAL
Qty: 0 | Refills: 0 | DISCHARGE

## 2019-10-03 RX ORDER — ERENUMAB-AOOE 70 MG/ML
0 INJECTION SUBCUTANEOUS
Qty: 0 | Refills: 0 | DISCHARGE

## 2019-10-03 RX ORDER — HYDROMORPHONE HYDROCHLORIDE 2 MG/ML
0.5 INJECTION INTRAMUSCULAR; INTRAVENOUS; SUBCUTANEOUS
Refills: 0 | Status: DISCONTINUED | OUTPATIENT
Start: 2019-10-03 | End: 2019-10-03

## 2019-10-03 RX ORDER — LUTEIN 20 MG
1 CAPSULE ORAL
Qty: 0 | Refills: 0 | DISCHARGE

## 2019-10-03 RX ORDER — FLUTICASONE PROPIONATE 50 MCG
1 SPRAY, SUSPENSION NASAL
Qty: 0 | Refills: 0 | DISCHARGE

## 2019-10-03 RX ORDER — OMEPRAZOLE 10 MG/1
1 CAPSULE, DELAYED RELEASE ORAL
Qty: 0 | Refills: 0 | DISCHARGE

## 2019-10-03 RX ORDER — ALBUTEROL 90 UG/1
2 AEROSOL, METERED ORAL
Qty: 0 | Refills: 0 | DISCHARGE

## 2019-10-03 RX ORDER — KETOROLAC TROMETHAMINE 30 MG/ML
30 SYRINGE (ML) INJECTION ONCE
Refills: 0 | Status: DISCONTINUED | OUTPATIENT
Start: 2019-10-03 | End: 2019-10-03

## 2019-10-03 RX ORDER — MULTIVIT-MIN/FERROUS GLUCONATE 9 MG/15 ML
1 LIQUID (ML) ORAL
Qty: 0 | Refills: 0 | DISCHARGE

## 2019-10-03 RX ORDER — ANASTROZOLE 1 MG/1
1 TABLET ORAL
Qty: 0 | Refills: 0 | DISCHARGE

## 2019-10-03 RX ORDER — ONDANSETRON 8 MG/1
4 TABLET, FILM COATED ORAL ONCE
Refills: 0 | Status: DISCONTINUED | OUTPATIENT
Start: 2019-10-03 | End: 2019-10-03

## 2019-10-03 RX ADMIN — Medication 30 MILLIGRAM(S): at 16:28

## 2019-10-03 RX ADMIN — SODIUM CHLORIDE 50 MILLILITER(S): 9 INJECTION, SOLUTION INTRAVENOUS at 12:52

## 2019-10-03 NOTE — ASU PATIENT PROFILE, ADULT - PSH
H/O lumpectomy  2018 and wide excision on 2019  H/O right breast biopsy  11/2018, abnormal  H/O sinus surgery  x4  H/O total knee replacement, bilateral  Left 2006  Right 1/2018  History of D&C  about 4  History of skin cancer  multiple excisions  History of tonsillectomy

## 2019-10-03 NOTE — ASU DISCHARGE PLAN (ADULT/PEDIATRIC) - CARE PROVIDER_API CALL
Severiano Ramírez (DPROCIO)  Podiatric Medicine and Surgery  [address], [address]  [place] [address]  Phone: [phone]  Fax: [phone]  Follow Up Time:

## 2019-10-03 NOTE — ASU PATIENT PROFILE, ADULT - PMH
Asthma    Breast cancer  right breast  Depression    Fluid retention in legs    Ganglion cyst  Left foot  GERD (gastroesophageal reflux disease)    Smiley syndrome    Hypothyroidism    IBS (irritable bowel syndrome)    Migraine    Mitral regurgitation  mild, on echo 2018  Obesity    Osteoarthritis    Other abnormal and inconclusive findings on diagnostic imaging of breast    Psoriasis    Psoriatic arthritis    Rheumatoid arthritis    Sicca syndrome    Skin cancer  hx of basal and squamus cell

## 2019-10-03 NOTE — ASU DISCHARGE PLAN (ADULT/PEDIATRIC) - CALL YOUR DOCTOR IF YOU HAVE ANY OF THE FOLLOWING:
Bleeding that does not stop/Swelling that gets worse/Pain not relieved by Medications/Fever greater than (need to indicate Fahrenheit or Celsius)/Wound/Surgical Site with redness, or foul smelling discharge or pus/Numbness, tingling, color or temperature change to extremity/Nausea and vomiting that does not stop

## 2019-10-04 ENCOUNTER — RESULT REVIEW (OUTPATIENT)
Age: 69
End: 2019-10-04

## 2019-10-04 PROCEDURE — 88304 TISSUE EXAM BY PATHOLOGIST: CPT | Mod: 26

## 2019-10-04 PROCEDURE — 88304 TISSUE EXAM BY PATHOLOGIST: CPT

## 2019-10-04 PROCEDURE — 28090 REMOVAL OF FOOT LESION: CPT | Mod: LT

## 2019-10-07 LAB — SURGICAL PATHOLOGY STUDY: SIGNIFICANT CHANGE UP

## 2019-10-10 ENCOUNTER — APPOINTMENT (OUTPATIENT)
Dept: HEMATOLOGY ONCOLOGY | Facility: CLINIC | Age: 69
End: 2019-10-10
Payer: COMMERCIAL

## 2019-10-10 VITALS
WEIGHT: 293 LBS | BODY MASS INDEX: 48.33 KG/M2 | HEART RATE: 56 BPM | SYSTOLIC BLOOD PRESSURE: 160 MMHG | DIASTOLIC BLOOD PRESSURE: 73 MMHG | RESPIRATION RATE: 16 BRPM | OXYGEN SATURATION: 96 % | TEMPERATURE: 98 F

## 2019-10-10 PROCEDURE — 99213 OFFICE O/P EST LOW 20 MIN: CPT

## 2019-10-10 RX ORDER — CIPROFLOXACIN HYDROCHLORIDE 500 MG/1
500 TABLET, FILM COATED ORAL
Qty: 14 | Refills: 1 | Status: DISCONTINUED | COMMUNITY
Start: 2019-04-09 | End: 2019-10-10

## 2019-10-10 RX ORDER — CEPHALEXIN 500 MG/1
500 CAPSULE ORAL
Qty: 30 | Refills: 0 | Status: DISCONTINUED | COMMUNITY
Start: 2019-02-09 | End: 2019-10-10

## 2019-10-10 RX ORDER — OMEGA-3 FATTY ACIDS/FISH OIL 360-1200MG
400 CAPSULE ORAL
Refills: 0 | Status: DISCONTINUED | COMMUNITY
End: 2019-10-10

## 2019-10-11 NOTE — HISTORY OF PRESENT ILLNESS
[Disease: _____________________] : Disease: [unfilled] [N: ___] : N[unfilled] [T: ___] : T[unfilled] [AJCC Stage: ____] : AJCC Stage: [unfilled] [de-identified] : 68yo woman presenting for medical oncology follow up.\par \par She underwent a screening mammogram on 10/12/18 and was found to have questionable architectural distortions. She was called back for a diagnostic mammogram on 10/23/18. It showed [id number] hypoechoic mass at 10 o’clock axis. She underwent core needle biopsy on 10/26/18 which revealed focal atypical duct hyperplasia  with microcalcifications adjacent to benign breast tissue with dense stroma. \par \par She underwent a lumpectomy of the right breast on 12/18/18: well-differentiated invasive ductal carcinoma, 6mm, ER 98% strong, WI 80% moderate to strong, Her2 negative (IHC 0), also with intermediate grade 1mm associated DCIS with focal necrosis identified. Inked margins were uninvolved, however there was invasive cancer present <1 mm from the margin. Lymph nodes were not examined. No LVI present. T1bN0\par \par She had a breast MRI on 1/4/19:\par \par 1.  Postsurgical change in the right breast, as above.\par 2.  Mildly T2 hyperintense enhancing mass in the upper outer right breast, possibly an intramammary lymph node. Second Look ultrasound with possible biopsy is recommended.\par 3.  No MRI evidence of malignancy in the left breast.\par \par 1/10/19 US guided biopsy:\par Breast MRI 1/3/2019 revealed a 7-8 mm indeterminate enhancing mass in the medial breast adjacent to a postoperative hematoma/seroma. Subsequent targeted ultrasound demonstrated a 5 x 4 x 4 mm hypoechoic mass with angular margins at 1:00, areolar margin, adjacent to the postoperative collection. In accordance with this finding, sampling the lesion by fine-needle aspiration was performed, with subsequent ultrasound core needle biopsy. \par Under ultrasound guidance, in a lateral approach, an 18-gauge needle was introduced into the lesion, which decreased in size but did not completely resolve. Subsequently, a 12-gauge core needle was used to obtain multiple core specimens of the target at 1:00, areolar margin.\par Core biopsy: organizing hematoma, fat necrosis and negative for mammary epithelium\par \par FNA: negative for malignant cells. \par \par 1/22/19 superior, medial, inferior, lateral margin excision and two right sentinel lymph node biopsies: there was no further cancer identified.\par \par She took antibiotics for a presumed right axillary cellulitis with seroma. She completed adjuvant radiation and started anastrozole May 2019.\par \par Pertinent History:\par She is  with two daughters (adopted), lives locally. She grew up in [place]. 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 LMP in early 90s, was on OCPs in college. \par FH maternal aunt (in 40s) and paternal aunt (in 80s) with breast cancer. both parents with melanomas (mom 65, father 75). She has two sisters in good health.\par OA/RA and Sjogrens: for many years. +RF, has had R TKR, on plaquenil, recently on celebrex, stopped perioperatively with stable symptoms so has not resumed. Rhematologist Dr. 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 Mcpherson cardiology (initially for peripheral edema), also on statin for HLD\par Asthma on symbicort daily\par Depression on lexapro\par Psoriasis - uses topical steroids PRN\par GERD\par PMD Dr. Azar, Gyn Dr. Vega (annual exam), last DEXA normal bone density almost 2 years ago. C-scope 8-9 years ago OK\par Mohs surgery for BCC x 2 in past [de-identified] : ER+CO+HER2- [de-identified] : Patient presents today for follow up on arimidex. She is tolerating therapy without significant side effects. Occasional mild knee pains. She notes occasional pains in R breast at surgical site but overall much improved since drainage of seroma. She asks me about removing radiation tattoos as treatment is completed. No other complaints. She recently travelled to [facility] with her family and had a great time.

## 2019-10-11 NOTE — PHYSICAL EXAM
[Restricted in physically strenuous activity but ambulatory and able to carry out work of a light or sedentary nature] : Status 1- Restricted in physically strenuous activity but ambulatory and able to carry out work of a light or sedentary nature, e.g., light house work, office work [Obese] : obese [Normal] : grossly intact [de-identified] : right breast lumpectomy scar and SLB well healed, some edema noted at nipple (stable), no drainage, no masses or adenopathy b/l [de-identified] : scattered moles, seborrheic keratosis, many. chest wall over sternum with 2 prior Mohs scars.

## 2019-10-11 NOTE — ASSESSMENT
[FreeTextEntry1] : 69 year old woman with OA/RA, Sjogrens on plaquenil, hypothyroidism, HLD, migraines, Smiley's syndrome, depression, asthma, recently diagnosed Stage IA, pT1bN0 well-differentiated invasive ductal carcinoma of the right breast s/p lumpectomy, reexcision and sentinel lymph node biopsy with negative margins and negative sentinel lymph nodes (course c/b persistent seroma and cellulitis). Oncotype Dx score 10; she completed adjuvant radiation and started AI 5/2019, doing well.\par \par -continue arimidex therapy daily, tolerating well\par -DEXA 3/2019 OK\par -Continue calcium (1200mg PO daily) and continue vitamin D supplementation (1000mg PO daily) for bone strength\par -She will continue follow up with her surgeon Dr. Rea for ongoing surveillance imaging and exams - discussed scheduling now as she is due, amenable\par -follow up with Dr. Isabel radiation oncology now and for questions regarding tattoo removal\par -She was also given contact information for genetic counseling given her significant family history\par -follow up with me 3 months, sooner if issues arise

## 2019-10-30 ENCOUNTER — OUTPATIENT (OUTPATIENT)
Dept: OUTPATIENT SERVICES | Facility: HOSPITAL | Age: 69
LOS: 1 days | End: 2019-10-30
Payer: COMMERCIAL

## 2019-10-30 ENCOUNTER — APPOINTMENT (OUTPATIENT)
Dept: MAMMOGRAPHY | Facility: CLINIC | Age: 69
End: 2019-10-30
Payer: COMMERCIAL

## 2019-10-30 ENCOUNTER — APPOINTMENT (OUTPATIENT)
Dept: ULTRASOUND IMAGING | Facility: CLINIC | Age: 69
End: 2019-10-30
Payer: COMMERCIAL

## 2019-10-30 DIAGNOSIS — Z90.89 ACQUIRED ABSENCE OF OTHER ORGANS: Chronic | ICD-10-CM

## 2019-10-30 DIAGNOSIS — Z98.890 OTHER SPECIFIED POSTPROCEDURAL STATES: Chronic | ICD-10-CM

## 2019-10-30 DIAGNOSIS — Z85.828 PERSONAL HISTORY OF OTHER MALIGNANT NEOPLASM OF SKIN: Chronic | ICD-10-CM

## 2019-10-30 DIAGNOSIS — Z00.00 ENCOUNTER FOR GENERAL ADULT MEDICAL EXAMINATION WITHOUT ABNORMAL FINDINGS: ICD-10-CM

## 2019-10-30 DIAGNOSIS — Z12.31 ENCOUNTER FOR SCREENING MAMMOGRAM FOR MALIGNANT NEOPLASM OF BREAST: ICD-10-CM

## 2019-10-30 DIAGNOSIS — Z96.653 PRESENCE OF ARTIFICIAL KNEE JOINT, BILATERAL: Chronic | ICD-10-CM

## 2019-10-30 PROCEDURE — 76641 ULTRASOUND BREAST COMPLETE: CPT | Mod: 26,50

## 2019-10-30 PROCEDURE — 76641 ULTRASOUND BREAST COMPLETE: CPT

## 2019-10-30 PROCEDURE — G0279: CPT

## 2019-10-30 PROCEDURE — G0279: CPT | Mod: 26

## 2019-10-30 PROCEDURE — 77066 DX MAMMO INCL CAD BI: CPT | Mod: 26

## 2019-10-30 PROCEDURE — 77066 DX MAMMO INCL CAD BI: CPT

## 2019-11-19 ENCOUNTER — APPOINTMENT (OUTPATIENT)
Dept: ORTHOPEDIC SURGERY | Facility: CLINIC | Age: 69
End: 2019-11-19
Payer: COMMERCIAL

## 2019-11-19 DIAGNOSIS — Z82.62 FAMILY HISTORY OF OSTEOPOROSIS: ICD-10-CM

## 2019-11-19 DIAGNOSIS — Z80.8 FAMILY HISTORY OF MALIGNANT NEOPLASM OF OTHER ORGANS OR SYSTEMS: ICD-10-CM

## 2019-11-19 PROCEDURE — 73110 X-RAY EXAM OF WRIST: CPT | Mod: LT

## 2019-11-19 PROCEDURE — 99203 OFFICE O/P NEW LOW 30 MIN: CPT

## 2019-11-19 NOTE — PHYSICAL EXAM
[de-identified] : Radiographs ordered and interpreted by me today, reviewed and discussed with the patient today.\par \par 3 views, right wrist, and hand.\par Basal joint narrowing. Osteophyte distal radial and distal ulnar corner of the trapezium. \par Sclerosis of trapezium and first metacarpal.\par radiographically, stage II - mild stage III basal joint arthritis.\par Remainder of wrist and carpus without notable degenerative changes.\par Minimal osteophyte formation. Thumb metacarpal head without thumb MP joint narrowing.\par Remaining MP joints without degenerative change. \par Mild marginal osteophyte formation: Thumb IP joint, and DIP 2, 3, 4, 5. \par No fractures or dislocation\par \par 3 views left wrist, basal joint, hand.\par Sclerosis, narrowing, osteophyte formation, distal radial, ulnar aspects of trapezium.\par Radiographically, stage III osteoarthritis.\par Remainder of left wrist and carpus without notable degenerative changes.\par No notable MP joint changes.\par No notable PIP joint changes.\par Subtle marginal changes. Thumb IP joint, DIP 3. \par No fractures or dislocations. [de-identified] : Right-hand\par Basal joint prominent, with adduction.\par 1-2+ crepitus\par 1-2+ pain with manipulation, joint line tenderness\par Long finger A1 pulley tenderness with provocable triggering\par There is no other A1 pulley tenderness or triggering in any other finger, right hand.\par No pertinent MP, PIP, or DIP joint contributory findings, except some Heberden's nodes; none are clinically painful.\par Left hand\par Basal joint adduction with moderate stiffness.\par 1+ crepitus, 1+ pain\par No A1 pulley tenderness and no triggering in any finger.\par No pertinent MP, PIP, or DIP joint contributory findings.\par \par Neurologic: Median, ulnar, and radial motor and sensory are intact. \par Skin: No cyanosis, clubbing, or rashes.\par Vascular: Radial pulses intact.\par Lymphatic: No streaking or epitrochlear adenopathy.\par The patient is awake, alert, and oriented. Affect appropriate. Cooperative.

## 2019-11-19 NOTE — REASON FOR VISIT
EXAMINATION:
XR PORTABLE CHEST
 
CLINICAL INFORMATION:
Chest pain
 
COMPARISON:
None
 
TECHNIQUE:
Portable frontal view of the chest was obtained.
 
FINDINGS:
No significant abnormality is noted involving the heart, lungs, mediastinum,
bony thorax or soft tissues.
 
IMPRESSION:
Unremarkable examination. [Initial Visit] : an initial visit for [FreeTextEntry2] : Bilateral thumb,and right middle finger pain

## 2019-11-19 NOTE — CONSULT LETTER
[Dear  ___] : Dear  [unfilled], [Consult Letter:] : I had the pleasure of evaluating your patient, [unfilled]. [FreeTextEntry1] : The patient was seen  in hand consultation today. A copy of my office note is enclosed for your review with the patient's knowledge and consent.\par \par Sincerely,\par \par Rafiq Hu MD\par Chief, Hand Surgery\par Residency \par Department of Orthopaedic Surgery \par [facility]\par Professor of Orthopaedic Surgery\par Jocelin ERAZO at [facility]

## 2019-11-19 NOTE — DISCUSSION/SUMMARY
[FreeTextEntry1] : Patient has bilateral basal joint arthritis. HMTS prescribed.\par The patient is already taking Celebrex and Plaquenil and should continue on these medications.\par Prescription with hand therapist for energy conservation and joint education, etc. provided.\par \par The patient has trigger finger right long finger. Cortisone injection, recommended, but declined by the patient. Patient states that she had a different previous trigger finger that resolved spontaneously. If not improved, the patient should return in one month for reassessment and cortisone injection, if triggering is persisting.\par \par  A lengthy and detailed discussion was held with the patient regarding analysis, treatment, and recommendations. All questions have been answered. At the conclusion the patient expressed acceptance and understanding.\par \par Hand therapy prescription:\par Bilateral basal joint osteoarthritis\par Psoriatic arthritis, of questionable contribution\par 1. HMTS, right\par 2. HMTS, left\par 3. Joint education/precautions/energy conservation\par 4. Assistive aids p.r.n.\par 5. Evaluate and treat p.r.n.

## 2019-11-19 NOTE — HISTORY OF PRESENT ILLNESS
[Right] : right hand dominant [FreeTextEntry1] : Pt is 68 yo HD female history of psoriatic arthritis ( Dr. De La Rosa rheumatologist) bilateral thumb,  and right middle finger pain. Right thumb pain started 1 year ago and left couple of years. Right middle finger triggering x 1 month. Triggering is worse in the morning  and gets better as the day goes on. Also reports active triggering. The pain in both thumbs is constant described to be ache. Celebrex is helpful. Reports occasional tingling. Denies numbness. \par \par \par \par

## 2019-12-24 ENCOUNTER — APPOINTMENT (OUTPATIENT)
Dept: ORTHOPEDIC SURGERY | Facility: CLINIC | Age: 69
End: 2019-12-24

## 2020-01-05 ENCOUNTER — RX RENEWAL (OUTPATIENT)
Age: 70
End: 2020-01-05

## 2020-02-15 ENCOUNTER — OUTPATIENT (OUTPATIENT)
Dept: OUTPATIENT SERVICES | Facility: HOSPITAL | Age: 70
LOS: 1 days | End: 2020-02-15
Payer: COMMERCIAL

## 2020-02-15 ENCOUNTER — APPOINTMENT (OUTPATIENT)
Dept: MRI IMAGING | Facility: IMAGING CENTER | Age: 70
End: 2020-02-15
Payer: COMMERCIAL

## 2020-02-15 DIAGNOSIS — Z98.890 OTHER SPECIFIED POSTPROCEDURAL STATES: Chronic | ICD-10-CM

## 2020-02-15 DIAGNOSIS — Z96.653 PRESENCE OF ARTIFICIAL KNEE JOINT, BILATERAL: Chronic | ICD-10-CM

## 2020-02-15 DIAGNOSIS — Z85.828 PERSONAL HISTORY OF OTHER MALIGNANT NEOPLASM OF SKIN: Chronic | ICD-10-CM

## 2020-02-15 DIAGNOSIS — Z90.89 ACQUIRED ABSENCE OF OTHER ORGANS: Chronic | ICD-10-CM

## 2020-02-15 DIAGNOSIS — Z00.8 ENCOUNTER FOR OTHER GENERAL EXAMINATION: ICD-10-CM

## 2020-02-15 PROCEDURE — C8937: CPT

## 2020-02-15 PROCEDURE — 77049 MRI BREAST C-+ W/CAD BI: CPT | Mod: 26

## 2020-02-15 PROCEDURE — C8908: CPT

## 2020-02-15 PROCEDURE — A9585: CPT

## 2020-02-25 ENCOUNTER — OUTPATIENT (OUTPATIENT)
Dept: OUTPATIENT SERVICES | Facility: HOSPITAL | Age: 70
LOS: 1 days | Discharge: HOME | End: 2020-02-25

## 2020-02-25 DIAGNOSIS — Z98.890 OTHER SPECIFIED POSTPROCEDURAL STATES: Chronic | ICD-10-CM

## 2020-02-25 DIAGNOSIS — Z85.828 PERSONAL HISTORY OF OTHER MALIGNANT NEOPLASM OF SKIN: Chronic | ICD-10-CM

## 2020-02-25 DIAGNOSIS — C50.919 MALIGNANT NEOPLASM OF UNSPECIFIED SITE OF UNSPECIFIED FEMALE BREAST: ICD-10-CM

## 2020-02-25 DIAGNOSIS — Z96.653 PRESENCE OF ARTIFICIAL KNEE JOINT, BILATERAL: Chronic | ICD-10-CM

## 2020-02-25 DIAGNOSIS — Z90.89 ACQUIRED ABSENCE OF OTHER ORGANS: Chronic | ICD-10-CM

## 2020-02-27 ENCOUNTER — APPOINTMENT (OUTPATIENT)
Dept: HEMATOLOGY ONCOLOGY | Facility: CLINIC | Age: 70
End: 2020-02-27
Payer: COMMERCIAL

## 2020-02-27 VITALS
OXYGEN SATURATION: 99 % | BODY MASS INDEX: 49.05 KG/M2 | RESPIRATION RATE: 14 BRPM | DIASTOLIC BLOOD PRESSURE: 70 MMHG | WEIGHT: 293 LBS | SYSTOLIC BLOOD PRESSURE: 130 MMHG | TEMPERATURE: 98 F | HEART RATE: 62 BPM

## 2020-02-27 PROCEDURE — 99213 OFFICE O/P EST LOW 20 MIN: CPT

## 2020-02-27 NOTE — HISTORY OF PRESENT ILLNESS
[Disease: _____________________] : Disease: [unfilled] [T: ___] : T[unfilled] [AJCC Stage: ____] : AJCC Stage: [unfilled] [N: ___] : N[unfilled] [de-identified] : 68yo woman presenting for medical oncology follow up.\par \par She underwent a screening mammogram on 10/12/18 and was found to have questionable architectural distortions. She was called back for a diagnostic mammogram on 10/23/18. It showed [id number] hypoechoic mass at 10 o’clock axis. She underwent core needle biopsy on 10/26/18 which revealed focal atypical duct hyperplasia  with microcalcifications adjacent to benign breast tissue with dense stroma. \par \par She underwent a lumpectomy of the right breast on 12/18/18: well-differentiated invasive ductal carcinoma, 6mm, ER 98% strong, OK 80% moderate to strong, Her2 negative (IHC 0), also with intermediate grade 1mm associated DCIS with focal necrosis identified. Inked margins were uninvolved, however there was invasive cancer present <1 mm from the margin. Lymph nodes were not examined. No LVI present. T1bN0\par \par She had a breast MRI on 1/4/19:\par \par 1.  Postsurgical change in the right breast, as above.\par 2.  Mildly T2 hyperintense enhancing mass in the upper outer right breast, possibly an intramammary lymph node. Second Look ultrasound with possible biopsy is recommended.\par 3.  No MRI evidence of malignancy in the left breast.\par \par 1/10/19 US guided biopsy:\par Breast MRI 1/3/2019 revealed a 7-8 mm indeterminate enhancing mass in the medial breast adjacent to a postoperative hematoma/seroma. Subsequent targeted ultrasound demonstrated a 5 x 4 x 4 mm hypoechoic mass with angular margins at 1:00, areolar margin, adjacent to the postoperative collection. In accordance with this finding, sampling the lesion by fine-needle aspiration was performed, with subsequent ultrasound core needle biopsy. \par Under ultrasound guidance, in a lateral approach, an 18-gauge needle was introduced into the lesion, which decreased in size but did not completely resolve. Subsequently, a 12-gauge core needle was used to obtain multiple core specimens of the target at 1:00, areolar margin.\par Core biopsy: organizing hematoma, fat necrosis and negative for mammary epithelium\par \par FNA: negative for malignant cells. \par \par 1/22/19 superior, medial, inferior, lateral margin excision and two right sentinel lymph node biopsies: there was no further cancer identified.\par \par She took antibiotics for a presumed right axillary cellulitis with seroma. She completed adjuvant radiation and started anastrozole May 2019.\par \par Pertinent History:\par She is  with two daughters (adopted), lives locally. She grew up in [place]. 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 LMP in early 90s, was on OCPs in college. \par FH maternal aunt (in 40s) and paternal aunt (in 80s) with breast cancer. both parents with melanomas (mom 65, father 75). She has two sisters in good health.\par OA/RA and Sjogrens: for many years. +RF, has had R TKR, on plaquenil, recently on celebrex, stopped perioperatively with stable symptoms so has not resumed. Rhematologist Dr. 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 Mcpherson cardiology (initially for peripheral edema), also on statin for HLD\par Asthma on symbicort daily\par Depression on lexapro\par Psoriasis - uses topical steroids PRN\par GERD\par PMD Dr. Azar, Gyn Dr. Vega (annual exam), last DEXA normal bone density almost 2 years ago. C-scope 8-9 years ago OK\par Mohs surgery for BCC x 2 in past [de-identified] : ER+OH+HER2- [de-identified] : Patient presents today for follow up on arimidex. She is tolerating therapy without significant side effects. Occasional mild knee pains and hand pains - started meloxicam with improvement per her rheumatologist. She notes occasional pains in R breast at surgical site that persist but have slightly improved over the last few months - sore when she lays flat to sleep, feels "ridges" in her breast tissue. MRI Breasts 2/2020 BIRADS2.

## 2020-02-27 NOTE — PHYSICAL EXAM
[Restricted in physically strenuous activity but ambulatory and able to carry out work of a light or sedentary nature] : Status 1- Restricted in physically strenuous activity but ambulatory and able to carry out work of a light or sedentary nature, e.g., light house work, office work [Obese] : obese [Normal] : affect appropriate [de-identified] : right breast lumpectomy scar and SLB well healed, dense breasts, some edema noted at nipple (stable), no drainage, no masses or adenopathy b/l [de-identified] : scattered moles, seborrheic keratosis, many. chest wall over sternum with 2 prior Mohs scars.

## 2020-03-16 ENCOUNTER — APPOINTMENT (OUTPATIENT)
Dept: CARDIOLOGY | Facility: CLINIC | Age: 70
End: 2020-03-16

## 2020-04-14 ENCOUNTER — APPOINTMENT (OUTPATIENT)
Dept: CARDIOLOGY | Facility: CLINIC | Age: 70
End: 2020-04-14

## 2020-05-28 DIAGNOSIS — R60.9 EDEMA, UNSPECIFIED: ICD-10-CM

## 2020-06-01 ENCOUNTER — APPOINTMENT (OUTPATIENT)
Dept: CARDIOLOGY | Facility: CLINIC | Age: 70
End: 2020-06-01
Payer: COMMERCIAL

## 2020-06-01 PROCEDURE — 99213 OFFICE O/P EST LOW 20 MIN: CPT | Mod: 95

## 2020-06-03 NOTE — REVIEW OF SYSTEMS
[Shortness Of Breath] : shortness of breath [Dyspnea on exertion] : dyspnea during exertion [Lower Ext Edema] : lower extremity edema [Joint Pain] : joint pain [Joint Swelling] : joint swelling [Joint Stiffness] : joint stiffness [Negative] : Heme/Lymph [Chest Pain] : no chest pain [Chest  Pressure] : no chest pressure [Palpitations] : no palpitations

## 2020-06-03 NOTE — HISTORY OF PRESENT ILLNESS
[FreeTextEntry1] : 70 yo woman whom I am meeting for the first time today. H/o mild mitral valve regurgitation, mildly dilated LA, last echo Nov 2018. Last EKG SBrady, 2018. She was diagnosed with breast cancer in Dec 2018 with well-differentiated invasive ductal carcinoma, 6mm, ER 98% strong, OH 80% moderate to strong, Her2 negative (IHC 0), also with intermediate grade 1mm associated DCIS with focal necrosis identified. Inked margins were uninvolved, however there was invasive cancer present <1 mm from the margin. T1bN0. She was treated with surgery, lumpectomy and lymph notes; XRT; no chemotherapy. She was treated with anastrazole. \par \par She takes propranolol for migraine h/a, obesity and depression. She takes plaquanil for systemic arthritis.\par \par She states that today's visit was prompted by some increase in dyspnea. She has a long h/o BUCKLEY, but seems worse over the last few months. No resting dyspnea or orthopnea / PND. She has chronic LE edema, unchanged. At a stable walking pace, she is fine, but notes BUCKLEY with stairs, carrying packages, inclines. No cough. No chest discomfort or pressure, palpitations, syncope. Weight remains unchanged. She does not adhere to a particularly low salt diet. No hemoptysis. \par \par BP at home, 120/80's or lower, with HR in the 50's - 60's. She has a home monitor, and checks it every few weeks. No h/o significant HTN; no h/o MI.

## 2020-06-25 ENCOUNTER — OUTPATIENT (OUTPATIENT)
Dept: OUTPATIENT SERVICES | Facility: HOSPITAL | Age: 70
LOS: 1 days | Discharge: HOME | End: 2020-06-25

## 2020-06-25 DIAGNOSIS — C50.919 MALIGNANT NEOPLASM OF UNSPECIFIED SITE OF UNSPECIFIED FEMALE BREAST: ICD-10-CM

## 2020-06-25 DIAGNOSIS — Z98.890 OTHER SPECIFIED POSTPROCEDURAL STATES: Chronic | ICD-10-CM

## 2020-06-25 DIAGNOSIS — Z96.653 PRESENCE OF ARTIFICIAL KNEE JOINT, BILATERAL: Chronic | ICD-10-CM

## 2020-06-25 DIAGNOSIS — Z90.89 ACQUIRED ABSENCE OF OTHER ORGANS: Chronic | ICD-10-CM

## 2020-06-25 DIAGNOSIS — Z85.828 PERSONAL HISTORY OF OTHER MALIGNANT NEOPLASM OF SKIN: Chronic | ICD-10-CM

## 2020-06-29 ENCOUNTER — APPOINTMENT (OUTPATIENT)
Dept: ORTHOPEDIC SURGERY | Facility: CLINIC | Age: 70
End: 2020-06-29
Payer: COMMERCIAL

## 2020-06-29 VITALS — TEMPERATURE: 96.5 F

## 2020-06-29 PROCEDURE — 99214 OFFICE O/P EST MOD 30 MIN: CPT | Mod: 25

## 2020-06-29 PROCEDURE — 20550 NJX 1 TENDON SHEATH/LIGAMENT: CPT | Mod: F7

## 2020-06-29 RX ORDER — CELECOXIB 200 MG/1
200 CAPSULE ORAL
Refills: 0 | Status: DISCONTINUED | COMMUNITY
End: 2020-06-29

## 2020-06-29 RX ORDER — MELOXICAM 7.5 MG/1
7.5 TABLET ORAL
Refills: 0 | Status: ACTIVE | COMMUNITY

## 2020-06-30 ENCOUNTER — APPOINTMENT (OUTPATIENT)
Dept: HEMATOLOGY ONCOLOGY | Facility: CLINIC | Age: 70
End: 2020-06-30
Payer: COMMERCIAL

## 2020-06-30 PROCEDURE — 99213 OFFICE O/P EST LOW 20 MIN: CPT | Mod: 95

## 2020-06-30 RX ORDER — PROPRANOLOL HYDROCHLORIDE 20 MG/1
20 TABLET ORAL TWICE DAILY
Qty: 180 | Refills: 3 | Status: DISCONTINUED | COMMUNITY
Start: 2018-10-02 | End: 2020-06-30

## 2020-06-30 RX ORDER — FLUTICASONE PROPIONATE 0.5 MG/G
0.05 CREAM TOPICAL
Qty: 60 | Refills: 0 | Status: DISCONTINUED | COMMUNITY
Start: 2020-06-10

## 2020-06-30 RX ORDER — KETOCONAZOLE 20.5 MG/ML
2 SHAMPOO, SUSPENSION TOPICAL
Qty: 120 | Refills: 0 | Status: DISCONTINUED | COMMUNITY
Start: 2020-06-24

## 2020-06-30 RX ORDER — ECONAZOLE NITRATE 10 MG/G
1 CREAM TOPICAL
Qty: 85 | Refills: 0 | Status: DISCONTINUED | COMMUNITY
Start: 2020-06-24

## 2020-06-30 RX ORDER — ERENUMAB-AOOE 70 MG/ML
70 INJECTION SUBCUTANEOUS
Refills: 0 | Status: DISCONTINUED | COMMUNITY
Start: 2018-11-27 | End: 2020-06-30

## 2020-06-30 RX ORDER — ERENUMAB-AOOE 140 MG/ML
140 INJECTION, SOLUTION SUBCUTANEOUS
Qty: 3 | Refills: 0 | Status: DISCONTINUED | COMMUNITY
Start: 2020-01-28

## 2020-06-30 RX ORDER — PNEUMOCOCCAL 23-VAL P-SAC VAC 25MCG/0.5
25 VIAL (ML) INJECTION
Qty: 1 | Refills: 0 | Status: DISCONTINUED | COMMUNITY
Start: 2020-01-17

## 2020-06-30 RX ORDER — METHYLPREDNISOLONE 4 MG/1
4 TABLET ORAL
Qty: 21 | Refills: 0 | Status: DISCONTINUED | COMMUNITY
Start: 2020-05-05

## 2020-07-08 ENCOUNTER — APPOINTMENT (OUTPATIENT)
Dept: SURGERY | Facility: CLINIC | Age: 70
End: 2020-07-08
Payer: COMMERCIAL

## 2020-07-08 PROCEDURE — 99213K: CUSTOM

## 2020-07-13 ENCOUNTER — APPOINTMENT (OUTPATIENT)
Dept: CV DIAGNOSITCS | Facility: HOSPITAL | Age: 70
End: 2020-07-13
Payer: COMMERCIAL

## 2020-07-13 ENCOUNTER — OUTPATIENT (OUTPATIENT)
Dept: OUTPATIENT SERVICES | Facility: HOSPITAL | Age: 70
LOS: 1 days | End: 2020-07-13

## 2020-07-13 DIAGNOSIS — Z85.828 PERSONAL HISTORY OF OTHER MALIGNANT NEOPLASM OF SKIN: Chronic | ICD-10-CM

## 2020-07-13 DIAGNOSIS — I34.0 NONRHEUMATIC MITRAL (VALVE) INSUFFICIENCY: ICD-10-CM

## 2020-07-13 DIAGNOSIS — Z98.890 OTHER SPECIFIED POSTPROCEDURAL STATES: Chronic | ICD-10-CM

## 2020-07-13 DIAGNOSIS — Z90.89 ACQUIRED ABSENCE OF OTHER ORGANS: Chronic | ICD-10-CM

## 2020-07-13 DIAGNOSIS — Z96.653 PRESENCE OF ARTIFICIAL KNEE JOINT, BILATERAL: Chronic | ICD-10-CM

## 2020-07-13 PROCEDURE — 93306 TTE W/DOPPLER COMPLETE: CPT | Mod: 26

## 2020-07-17 NOTE — ASU PATIENT PROFILE, ADULT - NS TRANSFER HEARING AID
Verbal shift change report (pt sleeping, report at nurses station) given to VINCE Romero RN (oncoming nurse) by LESTER Stokes RN (offgoing nurse). Report included the following information SBAR, Kardex, Intake/Output, MAR and Recent Results. 0900:  Breakfast tray given. 9307:  Dr. Jimbo Mendosa at bedside, states she will come back to check pt after she finishes eating. 0913:  Pt sitting up to eat, difficulty tracing FHR continuously. 0930:  Pt at sink brushing her teeth, intermittent tracing of FHR. 1145:  Report to WILL Arellano RN for lunch coverage. 1220:  Care resumed per Jorge Bueno RN.  3227:  Call placed to Dr. Jimbo Mendosa to come assess pt.  1300:  Dr. Jimbo Mendosa at bedside, cervical exam, cervical ripening balloon removed, 3/40/ballotable. 1510:  Spoke with Dr. Jimbo Mendosa re: possibility of giving pt a Pitocin rest for tonight, so pt is remaining in hospital bed at this time. 1535:  Bedside and Verbal shift change report given to Mulugeta Alcala RN (oncoming nurse) by IVNCE Romero RN (offgoing nurse). Report included the following information SBAR, Kardex, Intake/Output, MAR and Recent Results. none

## 2020-08-12 ENCOUNTER — OUTPATIENT (OUTPATIENT)
Dept: OUTPATIENT SERVICES | Facility: HOSPITAL | Age: 70
LOS: 1 days | Discharge: HOME | End: 2020-08-12

## 2020-08-12 DIAGNOSIS — Z96.653 PRESENCE OF ARTIFICIAL KNEE JOINT, BILATERAL: Chronic | ICD-10-CM

## 2020-08-12 DIAGNOSIS — Z98.890 OTHER SPECIFIED POSTPROCEDURAL STATES: Chronic | ICD-10-CM

## 2020-08-12 DIAGNOSIS — Z90.89 ACQUIRED ABSENCE OF OTHER ORGANS: Chronic | ICD-10-CM

## 2020-08-12 DIAGNOSIS — Z85.828 PERSONAL HISTORY OF OTHER MALIGNANT NEOPLASM OF SKIN: Chronic | ICD-10-CM

## 2020-08-12 DIAGNOSIS — C50.919 MALIGNANT NEOPLASM OF UNSPECIFIED SITE OF UNSPECIFIED FEMALE BREAST: ICD-10-CM

## 2020-08-17 ENCOUNTER — LABORATORY RESULT (OUTPATIENT)
Age: 70
End: 2020-08-17

## 2020-08-17 ENCOUNTER — APPOINTMENT (OUTPATIENT)
Dept: HEMATOLOGY ONCOLOGY | Facility: CLINIC | Age: 70
End: 2020-08-17

## 2020-10-16 ENCOUNTER — OUTPATIENT (OUTPATIENT)
Dept: OUTPATIENT SERVICES | Facility: HOSPITAL | Age: 70
LOS: 1 days | Discharge: HOME | End: 2020-10-16

## 2020-10-16 DIAGNOSIS — Z98.890 OTHER SPECIFIED POSTPROCEDURAL STATES: Chronic | ICD-10-CM

## 2020-10-16 DIAGNOSIS — Z96.653 PRESENCE OF ARTIFICIAL KNEE JOINT, BILATERAL: Chronic | ICD-10-CM

## 2020-10-16 DIAGNOSIS — Z85.828 PERSONAL HISTORY OF OTHER MALIGNANT NEOPLASM OF SKIN: Chronic | ICD-10-CM

## 2020-10-16 DIAGNOSIS — Z90.89 ACQUIRED ABSENCE OF OTHER ORGANS: Chronic | ICD-10-CM

## 2020-10-16 DIAGNOSIS — C50.919 MALIGNANT NEOPLASM OF UNSPECIFIED SITE OF UNSPECIFIED FEMALE BREAST: ICD-10-CM

## 2020-10-19 ENCOUNTER — APPOINTMENT (OUTPATIENT)
Dept: ORTHOPEDIC SURGERY | Facility: CLINIC | Age: 70
End: 2020-10-19

## 2020-10-20 ENCOUNTER — APPOINTMENT (OUTPATIENT)
Dept: HEMATOLOGY ONCOLOGY | Facility: CLINIC | Age: 70
End: 2020-10-20

## 2020-10-23 ENCOUNTER — RX RENEWAL (OUTPATIENT)
Age: 70
End: 2020-10-23

## 2020-10-23 ENCOUNTER — NON-APPOINTMENT (OUTPATIENT)
Age: 70
End: 2020-10-23

## 2020-11-05 ENCOUNTER — APPOINTMENT (OUTPATIENT)
Dept: HEMATOLOGY ONCOLOGY | Facility: CLINIC | Age: 70
End: 2020-11-05
Payer: COMMERCIAL

## 2020-11-05 VITALS
DIASTOLIC BLOOD PRESSURE: 80 MMHG | HEART RATE: 56 BPM | WEIGHT: 293 LBS | BODY MASS INDEX: 47.65 KG/M2 | SYSTOLIC BLOOD PRESSURE: 157 MMHG | TEMPERATURE: 97.3 F | HEIGHT: 65.79 IN | RESPIRATION RATE: 16 BRPM | OXYGEN SATURATION: 97 %

## 2020-11-05 PROCEDURE — 99214 OFFICE O/P EST MOD 30 MIN: CPT

## 2020-11-05 PROCEDURE — 99072 ADDL SUPL MATRL&STAF TM PHE: CPT

## 2020-11-10 ENCOUNTER — APPOINTMENT (OUTPATIENT)
Dept: ORTHOPEDIC SURGERY | Facility: CLINIC | Age: 70
End: 2020-11-10
Payer: COMMERCIAL

## 2020-11-10 VITALS
WEIGHT: 293 LBS | HEIGHT: 66.5 IN | BODY MASS INDEX: 46.53 KG/M2 | DIASTOLIC BLOOD PRESSURE: 79 MMHG | SYSTOLIC BLOOD PRESSURE: 151 MMHG | HEART RATE: 60 BPM

## 2020-11-10 VITALS — TEMPERATURE: 97.5 F

## 2020-11-10 DIAGNOSIS — Z78.9 OTHER SPECIFIED HEALTH STATUS: ICD-10-CM

## 2020-11-10 DIAGNOSIS — M18.12 UNILATERAL PRIMARY OSTEOARTHRITIS OF FIRST CARPOMETACARPAL JOINT, LEFT HAND: ICD-10-CM

## 2020-11-10 DIAGNOSIS — M65.331 TRIGGER FINGER, RIGHT MIDDLE FINGER: ICD-10-CM

## 2020-11-10 DIAGNOSIS — M67.441 GANGLION, RIGHT HAND: ICD-10-CM

## 2020-11-10 DIAGNOSIS — M18.11 UNILATERAL PRIMARY OSTEOARTHRITIS OF FIRST CARPOMETACARPAL JOINT, RIGHT HAND: ICD-10-CM

## 2020-11-10 PROCEDURE — 99072 ADDL SUPL MATRL&STAF TM PHE: CPT

## 2020-11-10 PROCEDURE — 99214 OFFICE O/P EST MOD 30 MIN: CPT | Mod: 25

## 2020-11-10 PROCEDURE — 20612 ASPIRATE/INJ GANGLION CYST: CPT | Mod: NC,F7

## 2020-11-10 PROCEDURE — 20550 NJX 1 TENDON SHEATH/LIGAMENT: CPT | Mod: F7,59

## 2020-11-25 ENCOUNTER — APPOINTMENT (OUTPATIENT)
Dept: ULTRASOUND IMAGING | Facility: CLINIC | Age: 70
End: 2020-11-25
Payer: COMMERCIAL

## 2020-11-25 ENCOUNTER — RESULT REVIEW (OUTPATIENT)
Age: 70
End: 2020-11-25

## 2020-11-25 ENCOUNTER — OUTPATIENT (OUTPATIENT)
Dept: OUTPATIENT SERVICES | Facility: HOSPITAL | Age: 70
LOS: 1 days | End: 2020-11-25
Payer: COMMERCIAL

## 2020-11-25 ENCOUNTER — APPOINTMENT (OUTPATIENT)
Dept: MAMMOGRAPHY | Facility: CLINIC | Age: 70
End: 2020-11-25
Payer: COMMERCIAL

## 2020-11-25 DIAGNOSIS — Z96.653 PRESENCE OF ARTIFICIAL KNEE JOINT, BILATERAL: Chronic | ICD-10-CM

## 2020-11-25 DIAGNOSIS — Z98.890 OTHER SPECIFIED POSTPROCEDURAL STATES: Chronic | ICD-10-CM

## 2020-11-25 DIAGNOSIS — Z85.828 PERSONAL HISTORY OF OTHER MALIGNANT NEOPLASM OF SKIN: Chronic | ICD-10-CM

## 2020-11-25 DIAGNOSIS — Z90.89 ACQUIRED ABSENCE OF OTHER ORGANS: Chronic | ICD-10-CM

## 2020-11-25 DIAGNOSIS — Z00.8 ENCOUNTER FOR OTHER GENERAL EXAMINATION: ICD-10-CM

## 2020-11-25 PROCEDURE — 76641 ULTRASOUND BREAST COMPLETE: CPT | Mod: 26,50

## 2020-11-25 PROCEDURE — G0279: CPT | Mod: 26

## 2020-11-25 PROCEDURE — G0279: CPT

## 2020-11-25 PROCEDURE — 77066 DX MAMMO INCL CAD BI: CPT | Mod: 26

## 2020-11-25 PROCEDURE — 77066 DX MAMMO INCL CAD BI: CPT

## 2020-11-25 PROCEDURE — 76641 ULTRASOUND BREAST COMPLETE: CPT

## 2021-01-13 ENCOUNTER — APPOINTMENT (OUTPATIENT)
Dept: MRI IMAGING | Facility: HOSPITAL | Age: 71
End: 2021-01-13
Payer: COMMERCIAL

## 2021-01-13 ENCOUNTER — OUTPATIENT (OUTPATIENT)
Dept: OUTPATIENT SERVICES | Facility: HOSPITAL | Age: 71
LOS: 1 days | End: 2021-01-13
Payer: COMMERCIAL

## 2021-01-13 DIAGNOSIS — Z98.890 OTHER SPECIFIED POSTPROCEDURAL STATES: Chronic | ICD-10-CM

## 2021-01-13 DIAGNOSIS — Z90.89 ACQUIRED ABSENCE OF OTHER ORGANS: Chronic | ICD-10-CM

## 2021-01-13 DIAGNOSIS — Z00.8 ENCOUNTER FOR OTHER GENERAL EXAMINATION: ICD-10-CM

## 2021-01-13 DIAGNOSIS — Z96.653 PRESENCE OF ARTIFICIAL KNEE JOINT, BILATERAL: Chronic | ICD-10-CM

## 2021-01-13 DIAGNOSIS — Z85.828 PERSONAL HISTORY OF OTHER MALIGNANT NEOPLASM OF SKIN: Chronic | ICD-10-CM

## 2021-01-13 PROCEDURE — 72146 MRI CHEST SPINE W/O DYE: CPT

## 2021-01-13 PROCEDURE — 72148 MRI LUMBAR SPINE W/O DYE: CPT | Mod: 26

## 2021-01-13 PROCEDURE — 72148 MRI LUMBAR SPINE W/O DYE: CPT

## 2021-01-13 PROCEDURE — 72146 MRI CHEST SPINE W/O DYE: CPT | Mod: 26

## 2021-03-18 ENCOUNTER — NON-APPOINTMENT (OUTPATIENT)
Age: 71
End: 2021-03-18

## 2021-03-18 DIAGNOSIS — R51.9 HEADACHE, UNSPECIFIED: ICD-10-CM

## 2021-03-21 ENCOUNTER — TRANSCRIPTION ENCOUNTER (OUTPATIENT)
Age: 71
End: 2021-03-21

## 2021-03-28 ENCOUNTER — TRANSCRIPTION ENCOUNTER (OUTPATIENT)
Age: 71
End: 2021-03-28

## 2021-04-16 ENCOUNTER — RESULT REVIEW (OUTPATIENT)
Age: 71
End: 2021-04-16

## 2021-04-16 ENCOUNTER — APPOINTMENT (OUTPATIENT)
Dept: MRI IMAGING | Facility: IMAGING CENTER | Age: 71
End: 2021-04-16
Payer: COMMERCIAL

## 2021-04-16 ENCOUNTER — OUTPATIENT (OUTPATIENT)
Dept: OUTPATIENT SERVICES | Facility: HOSPITAL | Age: 71
LOS: 1 days | End: 2021-04-16
Payer: COMMERCIAL

## 2021-04-16 DIAGNOSIS — Z90.89 ACQUIRED ABSENCE OF OTHER ORGANS: Chronic | ICD-10-CM

## 2021-04-16 DIAGNOSIS — Z98.890 OTHER SPECIFIED POSTPROCEDURAL STATES: Chronic | ICD-10-CM

## 2021-04-16 DIAGNOSIS — Z85.828 PERSONAL HISTORY OF OTHER MALIGNANT NEOPLASM OF SKIN: Chronic | ICD-10-CM

## 2021-04-16 DIAGNOSIS — Z96.653 PRESENCE OF ARTIFICIAL KNEE JOINT, BILATERAL: Chronic | ICD-10-CM

## 2021-04-16 DIAGNOSIS — Z00.8 ENCOUNTER FOR OTHER GENERAL EXAMINATION: ICD-10-CM

## 2021-04-16 PROCEDURE — 77049 MRI BREAST C-+ W/CAD BI: CPT | Mod: 26

## 2021-04-16 PROCEDURE — C8908: CPT

## 2021-04-16 PROCEDURE — C8937: CPT

## 2021-05-03 ENCOUNTER — OUTPATIENT (OUTPATIENT)
Dept: OUTPATIENT SERVICES | Facility: HOSPITAL | Age: 71
LOS: 1 days | Discharge: HOME | End: 2021-05-03

## 2021-05-03 DIAGNOSIS — Z98.890 OTHER SPECIFIED POSTPROCEDURAL STATES: Chronic | ICD-10-CM

## 2021-05-03 DIAGNOSIS — Z96.653 PRESENCE OF ARTIFICIAL KNEE JOINT, BILATERAL: Chronic | ICD-10-CM

## 2021-05-03 DIAGNOSIS — Z90.89 ACQUIRED ABSENCE OF OTHER ORGANS: Chronic | ICD-10-CM

## 2021-05-03 DIAGNOSIS — Z85.828 PERSONAL HISTORY OF OTHER MALIGNANT NEOPLASM OF SKIN: Chronic | ICD-10-CM

## 2021-05-03 DIAGNOSIS — C50.919 MALIGNANT NEOPLASM OF UNSPECIFIED SITE OF UNSPECIFIED FEMALE BREAST: ICD-10-CM

## 2021-05-04 ENCOUNTER — APPOINTMENT (OUTPATIENT)
Dept: HEMATOLOGY ONCOLOGY | Facility: CLINIC | Age: 71
End: 2021-05-04

## 2021-05-28 ENCOUNTER — APPOINTMENT (OUTPATIENT)
Dept: NEUROLOGY | Facility: CLINIC | Age: 71
End: 2021-05-28
Payer: COMMERCIAL

## 2021-05-28 VITALS
DIASTOLIC BLOOD PRESSURE: 80 MMHG | HEIGHT: 66 IN | WEIGHT: 293 LBS | BODY MASS INDEX: 47.09 KG/M2 | SYSTOLIC BLOOD PRESSURE: 130 MMHG | HEART RATE: 60 BPM

## 2021-05-28 PROCEDURE — 99072 ADDL SUPL MATRL&STAF TM PHE: CPT

## 2021-05-28 PROCEDURE — 99215 OFFICE O/P EST HI 40 MIN: CPT

## 2021-05-28 NOTE — ASSESSMENT
[FreeTextEntry1] : Impression: This patient has a chronic history of migraine with worsening during the last 3 months.  Having increased frequency of the headache in spite of Aimovig and Ajovy medication trials.  She does get relief temporarily with Relpax 40 mg but the headache tends to recur the following day suggesting there may be a component of rebound.  She also has a history of having had previous work-up for right hemifacial spasm and right Smiley syndrome.  She has multiple other medical comorbidities including obesity asthma rheumatoid arthritis chronic sinus condition lumbar spondylosis and back pain and bilateral knee pain.  Is also a history of depression.\par \par Recommendations: Prednisone 60 mg daily for 3 days and then taper as directed to break the headache cycle.  Restart Topamax 25 to 50 mg at bedtime as directed.  I would not increase propranolol at this juncture since the patient has bradycardia and history of asthma.  Ajovy has been stopped.  Attempt to reduce the frequency and use of Relpax 40 mg.  Consider ENT follow-up for her sinus condition.  Rheumatology follow-up.  Lifestyle modification and weight reduction.  Chiropractic treatment for her low back condition.  Defer repeat brain MRI at this juncture.\par

## 2021-05-28 NOTE — HISTORY OF PRESENT ILLNESS
[FreeTextEntry1] : Mrs. Ching is been having increased frequency of headache.  In March she had 14 headache days in April 11 headache days and so far in May 11 headache days.  She gets relief with Relpax 40 mg tablets but the headache will often recur the following day.  She did not improve with Aimovig and the medication was stopped due to constipation.  She was given a trial of Ajovy and this medication was ineffective after 2 months and discontinued.  She has been taking propranolol 40 mg twice daily.  Higher doses have not been prescribed due to bradycardia and a history of asthma.  Past she was given topiramate and the medication was stopped.\par \par She has a history of right hemifacial spasm.  She had an evaluation for a suspected right Smiley syndrome.  She has a history of obesity and has gained 15 pounds during the pandemic.\par \par Having chronic sinus problems which can contribute to her headache.\par \par There is a history of depression.\par \par She has chronic lower back pain with degenerative change on an MRI and is receiving chiropractic treatment and acupuncture and physical therapy in this regard.

## 2021-05-28 NOTE — PHYSICAL EXAM
[FreeTextEntry1] : Head:  Normocephalic Neck: Supple nontender no carotid bruits.  Spine:  Nontender negative straight leg raising.\par \par Mental Status:  Alert Oriented X3 Speech normal and no aphasia or dysarthria.\par \par Cranial Nerves: Mild anisocoria mild right upper eyelid ptosis, Fundi normal Visual Fields full  EOMI no diplopia no ptosis no nystagmus, V through XII intact.  No evidence of recurrent right facial spasm\par \par Motor:  No drift, normal strength tone and coordination and no focal atrophy. No abnormal movements. No dysmetria.  Normal rapid alternating movements. \par \par DTRs: Symmetric .  Plantars flexor.  No Clonus.\par \par Sensory:  Normal testing with pin light touch and vibration and  Joint position sense.  Normal DSS to touch.\par \par Gait: Consistent with her obesity and chronic lower back and bilateral knee discomfort.\par

## 2021-06-02 ENCOUNTER — NON-APPOINTMENT (OUTPATIENT)
Age: 71
End: 2021-06-02

## 2021-06-06 NOTE — ASU PATIENT PROFILE, ADULT - MENTAL HEALTH CONDITIONS/SYMPTOMS, PROFILE
----- Message from Jaclyn Andres MD sent at 2/19/2020 10:23 AM CST -----  She had a thyroid nodule that needs US and I forgot to order it. I did just now--can you let her know and have melinda schedule it for her? Thanks   depression

## 2021-06-28 ENCOUNTER — OUTPATIENT (OUTPATIENT)
Dept: OUTPATIENT SERVICES | Facility: HOSPITAL | Age: 71
LOS: 1 days | Discharge: HOME | End: 2021-06-28

## 2021-06-28 DIAGNOSIS — Z98.890 OTHER SPECIFIED POSTPROCEDURAL STATES: Chronic | ICD-10-CM

## 2021-06-28 DIAGNOSIS — Z90.89 ACQUIRED ABSENCE OF OTHER ORGANS: Chronic | ICD-10-CM

## 2021-06-28 DIAGNOSIS — Z85.828 PERSONAL HISTORY OF OTHER MALIGNANT NEOPLASM OF SKIN: Chronic | ICD-10-CM

## 2021-06-28 DIAGNOSIS — Z96.653 PRESENCE OF ARTIFICIAL KNEE JOINT, BILATERAL: Chronic | ICD-10-CM

## 2021-06-28 DIAGNOSIS — C50.919 MALIGNANT NEOPLASM OF UNSPECIFIED SITE OF UNSPECIFIED FEMALE BREAST: ICD-10-CM

## 2021-06-29 ENCOUNTER — APPOINTMENT (OUTPATIENT)
Dept: HEMATOLOGY ONCOLOGY | Facility: CLINIC | Age: 71
End: 2021-06-29
Payer: COMMERCIAL

## 2021-06-29 VITALS
WEIGHT: 293 LBS | HEART RATE: 56 BPM | SYSTOLIC BLOOD PRESSURE: 120 MMHG | TEMPERATURE: 98 F | BODY MASS INDEX: 48.39 KG/M2 | OXYGEN SATURATION: 98 % | RESPIRATION RATE: 16 BRPM | DIASTOLIC BLOOD PRESSURE: 80 MMHG

## 2021-06-29 PROCEDURE — 99072 ADDL SUPL MATRL&STAF TM PHE: CPT

## 2021-06-29 PROCEDURE — 99214 OFFICE O/P EST MOD 30 MIN: CPT

## 2021-07-12 ENCOUNTER — APPOINTMENT (OUTPATIENT)
Dept: SURGERY | Facility: CLINIC | Age: 71
End: 2021-07-12
Payer: COMMERCIAL

## 2021-07-12 PROCEDURE — 99213K: CUSTOM

## 2021-07-19 ENCOUNTER — APPOINTMENT (OUTPATIENT)
Dept: OTOLARYNGOLOGY | Facility: CLINIC | Age: 71
End: 2021-07-19
Payer: COMMERCIAL

## 2021-07-19 PROCEDURE — 92567 TYMPANOMETRY: CPT

## 2021-07-19 PROCEDURE — 92557 COMPREHENSIVE HEARING TEST: CPT

## 2021-07-19 PROCEDURE — 99203 OFFICE O/P NEW LOW 30 MIN: CPT | Mod: 25

## 2021-07-19 RX ORDER — ALBUTEROL SULFATE 90 UG/1
108 (90 BASE) INHALANT RESPIRATORY (INHALATION)
Qty: 8 | Refills: 0 | Status: ACTIVE | COMMUNITY
Start: 2021-04-23

## 2021-07-19 RX ORDER — PREDNISONE 20 MG/1
20 TABLET ORAL
Qty: 20 | Refills: 0 | Status: DISCONTINUED | COMMUNITY
Start: 2021-05-28 | End: 2021-07-19

## 2021-07-19 RX ORDER — AMOXICILLIN AND CLAVULANATE POTASSIUM 875; 125 MG/1; MG/1
875-125 TABLET, COATED ORAL
Qty: 16 | Refills: 0 | Status: COMPLETED | COMMUNITY
Start: 2021-06-21

## 2021-07-19 NOTE — DATA REVIEWED
[de-identified] : LE: Hearing WNL through 2 KHz, sloping to a mild to moderate SNHL.\par RE: Hearing WNL through 1.5 KHz,sloping to a mild SNHL through 4 KHz, rising to hearing WNL.\par Type A Tymp, Au.

## 2021-07-19 NOTE — PHYSICAL EXAM
[Midline] : trachea located in midline position [Normal] : no rashes [] : [place] test is negative

## 2021-07-19 NOTE — HISTORY OF PRESENT ILLNESS
[de-identified] : 70 yr old presents for initial evaluation of hearing loss-  noticing a hearing loss x few years - family/children noting a hearing loss- has never had recent hearing tested - patient's states her mother told her about age 8 she had measles which caused HL - no audios then.  Both parents had HL as adults used Hearing Aids .  No c/o tinnitus.  Occas. vertigo episode with room spinning (several hours) (maybe every 6 months) -last episode about 4 months ago - usually occurs in morning, lasts either minutes or hours.  Hx of severe migraines- being seen by neurology for that - Hx of hypothryoid, RA?, osteoarthritis

## 2021-07-26 ENCOUNTER — RX RENEWAL (OUTPATIENT)
Age: 71
End: 2021-07-26

## 2021-07-26 ENCOUNTER — TRANSCRIPTION ENCOUNTER (OUTPATIENT)
Age: 71
End: 2021-07-26

## 2021-08-02 ENCOUNTER — TRANSCRIPTION ENCOUNTER (OUTPATIENT)
Age: 71
End: 2021-08-02

## 2021-08-09 ENCOUNTER — APPOINTMENT (OUTPATIENT)
Dept: RADIOLOGY | Facility: HOSPITAL | Age: 71
End: 2021-08-09
Payer: COMMERCIAL

## 2021-08-09 ENCOUNTER — APPOINTMENT (OUTPATIENT)
Dept: NEUROLOGY | Facility: CLINIC | Age: 71
End: 2021-08-09
Payer: COMMERCIAL

## 2021-08-09 ENCOUNTER — OUTPATIENT (OUTPATIENT)
Dept: OUTPATIENT SERVICES | Facility: HOSPITAL | Age: 71
LOS: 1 days | End: 2021-08-09
Payer: COMMERCIAL

## 2021-08-09 VITALS
HEART RATE: 62 BPM | BODY MASS INDEX: 47.09 KG/M2 | SYSTOLIC BLOOD PRESSURE: 118 MMHG | WEIGHT: 293 LBS | DIASTOLIC BLOOD PRESSURE: 76 MMHG | HEIGHT: 66 IN

## 2021-08-09 DIAGNOSIS — Z90.89 ACQUIRED ABSENCE OF OTHER ORGANS: Chronic | ICD-10-CM

## 2021-08-09 DIAGNOSIS — Z98.890 OTHER SPECIFIED POSTPROCEDURAL STATES: Chronic | ICD-10-CM

## 2021-08-09 DIAGNOSIS — Z85.828 PERSONAL HISTORY OF OTHER MALIGNANT NEOPLASM OF SKIN: Chronic | ICD-10-CM

## 2021-08-09 DIAGNOSIS — M47.816 SPONDYLOSIS W/OUT MYELOPATHY OR RADICULOPATHY, LUMBAR REGION: ICD-10-CM

## 2021-08-09 DIAGNOSIS — Z00.8 ENCOUNTER FOR OTHER GENERAL EXAMINATION: ICD-10-CM

## 2021-08-09 DIAGNOSIS — Z96.653 PRESENCE OF ARTIFICIAL KNEE JOINT, BILATERAL: Chronic | ICD-10-CM

## 2021-08-09 DIAGNOSIS — R68.89 OTHER GENERAL SYMPTOMS AND SIGNS: ICD-10-CM

## 2021-08-09 PROCEDURE — 99215 OFFICE O/P EST HI 40 MIN: CPT

## 2021-08-09 PROCEDURE — 71046 X-RAY EXAM CHEST 2 VIEWS: CPT

## 2021-08-09 PROCEDURE — 71046 X-RAY EXAM CHEST 2 VIEWS: CPT | Mod: 26

## 2021-08-09 NOTE — REASON FOR VISIT
[Follow-Up: _____] : a [unfilled] follow-up visit [FreeTextEntry1] : Chronic migraine headache disorder and other neurological problems

## 2021-08-09 NOTE — HISTORY OF PRESENT ILLNESS
[FreeTextEntry1] : Dione Ching is seen for an office visit.  Her migraine headache disorder is definitely moderated since she was last seen on 5/28/2021.  She has been taking topiramate 50 mg at bedtime and she benefits from acupuncture with Dr. Trejo.  She also receives propranolol 40 mg twice daily.  There was no benefit with CGRP medication including Aimovig and Ajovy.  \par \par She has had occasional recurrence of right facial spasm.  She has chronic lower back pain.  She was having intermittent numbness distal left lower extremity exacerbated after ambulation and association with her back pain which is moderated with acupuncture and exercise and diet.  In the last 2 weeks she has had upper respiratory symptoms.  Covid test was negative.  She has a history of asthma.  She is vaccinated.  She has been treated by her pulmonologist receiving antibiotics and prednisone.  He is improving.\par \par She questions forgetfulness including remembering names and word retrieval.

## 2021-08-09 NOTE — PHYSICAL EXAM
[FreeTextEntry1] : Head:  Normocephalic Neck: Supple nontender no carotid bruits.  Spine:  Nontender reduced forward bending.  Negative straight leg raising.\par \par Mental Status:  Alert Oriented X3 Speech normal and no aphasia or dysarthria.\par \par Cranial Nerves: Mild anisocoria with mild right upper eyelid ptosis,, Fundi normal Visual Fields full  EOMI no diplopia no nystagmus, V through XII intact.\par \par Motor:  No drift, normal strength tone and coordination and no focal atrophy. No abnormal movements. No dysmetria.  Normal rapid alternating movements. \par \par DTRs: Symmetric   Plantars flexor.  No Clonus.\par \par Sensory:  Normal testing with pin light touch and vibration and  Joint position sense.  Normal DSS to touch.\par \par Gait: Unchanged consistent with obesity.\par

## 2021-08-09 NOTE — ASSESSMENT
[FreeTextEntry1] : Impression: This patient has a chronic migraine headache disorder which has improved since she was last seen in May 2021.  Her depression has moderated and she continues to receive Escitalopram.  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  Her low back pain is being treated with acupuncture and chiropractic and there is stability.  She has symptoms which suggest left lower extremity radiculopathy improved.  She reports forgetfulness which is probably multifactorial related to her chronic medical conditions and polypharmacy.  Overall neurological status is stable.\par \par Recommendations: Continue medication as ordered including Topamax 50 mg at bedtime and propranolol 40 mg twice daily.  Agree with chiropractic treatment with acupuncture for her low back syndrome.  Office follow-up in 3 months.\par

## 2021-09-30 ENCOUNTER — NON-APPOINTMENT (OUTPATIENT)
Age: 71
End: 2021-09-30

## 2021-09-30 ENCOUNTER — APPOINTMENT (OUTPATIENT)
Dept: CARDIOLOGY | Facility: CLINIC | Age: 71
End: 2021-09-30
Payer: COMMERCIAL

## 2021-09-30 VITALS — HEART RATE: 57 BPM | OXYGEN SATURATION: 94 % | DIASTOLIC BLOOD PRESSURE: 84 MMHG | SYSTOLIC BLOOD PRESSURE: 130 MMHG

## 2021-09-30 PROCEDURE — 93000 ELECTROCARDIOGRAM COMPLETE: CPT

## 2021-09-30 PROCEDURE — 99214 OFFICE O/P EST MOD 30 MIN: CPT

## 2021-09-30 NOTE — HISTORY OF PRESENT ILLNESS
[FreeTextEntry1] : 72 yo woman presents for cardiology followup, last seen by me in June 2020.\par \par H/o mild mitral valve regurgitation, mildly dilated LA, last echo Nov 2018. She was diagnosed with breast cancer in Dec 2018 with well-differentiated invasive ductal carcinoma, 6mm, ER 98% strong, MI 80% moderate to strong, Her2 negative (IHC 0), also with intermediate grade 1mm associated DCIS with focal necrosis identified. Inked margins were uninvolved, however there was invasive cancer present <1 mm from the margin. T1bN0. She was treated with surgery, lumpectomy and lymph notes; XRT; no chemotherapy. She was treated with anastrazole. \par \par She takes propranolol for migraine h/a, obesity and depression. She takes plaquanil for systemic arthritis.\par \par No h/o HTN. Not on statin therapy; she has her lipids done yearly with PCP. Borderline DM. Obesity.\par \par At her last visit, she noted an increase in dyspnea. Echo at that time showed normal LVEF, 62%, with mild diastolic dysfunction, mildly dilated left atrium, mild mitral regurgitation, normal right heart size and function. \par \par EKG today:\par Sinus bradycardia \par First degree AV block\par Incomplete RBBB\par Nonspecific ST abnormalities\par ABNORMAL ECG\par \par Today, presents for routine cardiology follow-up. At the end of July, she felt a scratchy throat, was checked for COVID (negative), and felt poorly with short of breath, severe coughing, and wheezing. She was treated with an antibiotic and asthma rx. She has since improved from this exacerbation. Chronic LE edema.

## 2021-09-30 NOTE — PHYSICAL EXAM
[Well Developed] : well developed [Well Nourished] : well nourished [No Acute Distress] : no acute distress [Obese] : obese [Normal Conjunctiva] : normal conjunctiva [Normal Venous Pressure] : normal venous pressure [No Carotid Bruit] : no carotid bruit [Normal S1, S2] : normal S1, S2 [No Murmur] : no murmur [No Rub] : no rub [No Gallop] : no gallop [Clear Lung Fields] : clear lung fields [Good Air Entry] : good air entry [No Respiratory Distress] : no respiratory distress  [Soft] : abdomen soft [Non Tender] : non-tender [No Masses/organomegaly] : no masses/organomegaly [Normal Bowel Sounds] : normal bowel sounds [Normal Gait] : normal gait [No Cyanosis] : no cyanosis [No Clubbing] : no clubbing [No Varicosities] : no varicosities [No Rash] : no rash [No Skin Lesions] : no skin lesions [Moves all extremities] : moves all extremities [No Focal Deficits] : no focal deficits [Normal Speech] : normal speech [Alert and Oriented] : alert and oriented [Normal memory] : normal memory [de-identified] : 2+ edema, with pitting

## 2021-10-25 ENCOUNTER — RX RENEWAL (OUTPATIENT)
Age: 71
End: 2021-10-25

## 2021-11-09 ENCOUNTER — APPOINTMENT (OUTPATIENT)
Dept: NEUROLOGY | Facility: CLINIC | Age: 71
End: 2021-11-09
Payer: COMMERCIAL

## 2021-11-09 VITALS
WEIGHT: 293 LBS | BODY MASS INDEX: 47.09 KG/M2 | DIASTOLIC BLOOD PRESSURE: 78 MMHG | HEART RATE: 62 BPM | SYSTOLIC BLOOD PRESSURE: 126 MMHG | HEIGHT: 66 IN

## 2021-11-09 PROCEDURE — 99215 OFFICE O/P EST HI 40 MIN: CPT

## 2021-11-09 NOTE — HISTORY OF PRESENT ILLNESS
[FreeTextEntry1] : Dione Ching is seen for an office visit.  Her headaches have moderated and because of perceived side effects she did reduce topiramate from 50 mg to 25 mg at bedtime.  She was having some cognitive changes and fatigue.  She will occasionally use Relpax 40 mg as needed.  She continues propranolol 40 mg twice daily.  She has a significant reduction in the frequency of headache.  Depression has moderated and she continues to receive Relpax.  She is complaining of low back pain and she has left lower extremity numbness with ambulation which gets relieved by rest.  She receives acupuncture and she has had chiropractic treatment and physical therapy for spinal stenosis.  She had an MRI in this regard in January 2021 with spinal stenosis at the L4-5 level.  Her hemifacial spasm has significantly diminished.  Her medications are otherwise unchanged.

## 2021-11-09 NOTE — PHYSICAL EXAM
[FreeTextEntry1] : Head:  Normocephalic Neck: Supple nontender no carotid bruits.  Spine:  Nontender negative straight leg raising.\par \par Mental Status:  Alert Oriented X3 Speech normal and no aphasia or dysarthria.\par \par Cranial Nerves: Mild anisocoria and mild right upper eyelid ptosis unchanged.  Fundi normal Visual Fields full  EOMI no diplopia no ptosis no nystagmus, V through XII intact.\par \par Motor:  No drift, normal strength tone and coordination and no focal atrophy. No abnormal movements. No dysmetria.  Normal rapid alternating movements. \par \par DTRs: Symmetric hypoactive..  Plantars flexor.  No Clonus.\par \par Sensory:  Normal testing with pin light touch and vibration and  Joint position sense.  Normal DSS to touch.\par \par Gait: Unchanged.\par

## 2021-11-09 NOTE — ASSESSMENT
[FreeTextEntry1] : Impression: This patient has a chronic migraine headache disorder which has improved.  She has reduce topiramate to 25 mg at bedtime.  For depression she receives Escitalopram.  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  She has lumbar spinal stenosis and she does have left lower extremity sensory symptomatology associated with ambulation.\par \par Recommendations: Trial of Nurtec 75 mg ODT to be taken as needed and every other day as a preventive as directed.  Topiramate 25 mg at bedtime propranolol 40 mg twice daily Relpax 40 mg as needed chiropractic treatment and acupuncture for her low back syndrome.  Consider lumbar epidural steroid injection.  Office follow-up in 6 weeks.\par

## 2021-11-09 NOTE — REASON FOR VISIT
[Follow-Up: _____] : a [unfilled] follow-up visit [FreeTextEntry1] : Chronic headache disorder lower back pain and other neurological problems

## 2021-11-18 ENCOUNTER — APPOINTMENT (OUTPATIENT)
Dept: CARDIOLOGY | Facility: CLINIC | Age: 71
End: 2021-11-18
Payer: COMMERCIAL

## 2021-11-18 ENCOUNTER — NON-APPOINTMENT (OUTPATIENT)
Age: 71
End: 2021-11-18

## 2021-11-18 VITALS — DIASTOLIC BLOOD PRESSURE: 78 MMHG | OXYGEN SATURATION: 96 % | HEART RATE: 55 BPM | SYSTOLIC BLOOD PRESSURE: 121 MMHG

## 2021-11-18 PROCEDURE — 93000 ELECTROCARDIOGRAM COMPLETE: CPT

## 2021-11-18 PROCEDURE — 99214 OFFICE O/P EST MOD 30 MIN: CPT

## 2021-11-18 RX ORDER — PIOGLITAZONE 45 MG/1
TABLET ORAL
Refills: 0 | Status: DISCONTINUED | COMMUNITY
End: 2021-11-18

## 2021-11-18 NOTE — PHYSICAL EXAM
[Well Developed] : well developed [Well Nourished] : well nourished [No Acute Distress] : no acute distress [Obese] : obese [Normal Conjunctiva] : normal conjunctiva [Normal Venous Pressure] : normal venous pressure [No Carotid Bruit] : no carotid bruit [Normal S1, S2] : normal S1, S2 [No Murmur] : no murmur [No Rub] : no rub [No Gallop] : no gallop [Clear Lung Fields] : clear lung fields [Good Air Entry] : good air entry [No Respiratory Distress] : no respiratory distress  [Soft] : abdomen soft [Non Tender] : non-tender [No Masses/organomegaly] : no masses/organomegaly [Normal Bowel Sounds] : normal bowel sounds [Normal Gait] : normal gait [No Cyanosis] : no cyanosis [No Clubbing] : no clubbing [No Varicosities] : no varicosities [No Rash] : no rash [No Skin Lesions] : no skin lesions [Moves all extremities] : moves all extremities [No Focal Deficits] : no focal deficits [Normal Speech] : normal speech [Alert and Oriented] : alert and oriented [Normal memory] : normal memory [de-identified] : 2+ edema, with mild pitting

## 2021-11-18 NOTE — HISTORY OF PRESENT ILLNESS
[FreeTextEntry1] : 72 yo woman presents for cardiology followup.\par \par -Breast cancer in Dec 2018 with well-differentiated invasive ductal carcinoma, 6mm, ER 98% strong, CO 80% moderate to strong, Her2 negative (IHC 0), also with intermediate grade 1mm associated DCIS with focal necrosis identified. Inked margins were uninvolved, however there was invasive cancer present <1 mm from the margin. T1bN0. She was treated with surgery, lumpectomy and lymph notes; XRT; no chemotherapy. She was treated with anastrazole. \par -Migraine h/a, obesity and depression. She takes plaquanil for systemic arthritis.\par -No h/o HTN. Not on statin therapy; borderline DM; obesity.\par \par Echo 2020: normal LVEF, 62%, with mild diastolic dysfunction, mildly dilated left atrium, mild mitral regurgitation, normal right heart size and function. \par \par At her last visit with me in Sept 2021, the following issues were discussed:\par Shortness of breath on exertion (786.05) (R06.02)\par  · Recent issue of cough, SOB, most likely bronchitis. No over cardiac issues. Echo\par     checked last time c/w normal LVEF, some diastolic dysfunction. Moderate LE edema;\par     lungs are clear. Consider low dose Lasix, PRN.\par Diastolic heart failure (428.30) (I50.30)\par  · Numerous risk factors for HFpEF. No rales on exam, but LE edema. A trial of low dose\par     Lasix seems appropriate. She will have labs drawn next week at PCP office.\par Obesity (BMI 30-39.9) (278.00) (E66.9)\par  · Has lost some weight. Encouraged continued weight loss.\par Prediabetes (790.29) (R73.03)\par  · States she has been told of pre-DM. Needs f/u A1C, which she will get next week at PCP\par     office. Should also get lipids; will likely need statin therapy.\par \par ECG today:\par Sinus bradycardia  \par First degree A-V block  \par Occasional PAC   \par RSR' V1V2\par ABNORMAL ECG\par \par Labs October 2021:\par A1c 5.6%\par TSH normal\par creat 1.03\par LFT's normal\par chol 182, HDL 62, trig 76,  mg/dL\par \par In general, she is doing well. Goes to PT, increased exercise. Lost a few lbs. No CV complaints. Still has mild/mod LE edema; Lasix did not seem to help much. \par \par

## 2021-11-29 ENCOUNTER — RESULT REVIEW (OUTPATIENT)
Age: 71
End: 2021-11-29

## 2021-11-29 ENCOUNTER — OUTPATIENT (OUTPATIENT)
Dept: OUTPATIENT SERVICES | Facility: HOSPITAL | Age: 71
LOS: 1 days | End: 2021-11-29
Payer: COMMERCIAL

## 2021-11-29 ENCOUNTER — APPOINTMENT (OUTPATIENT)
Dept: MAMMOGRAPHY | Facility: IMAGING CENTER | Age: 71
End: 2021-11-29
Payer: COMMERCIAL

## 2021-11-29 ENCOUNTER — APPOINTMENT (OUTPATIENT)
Dept: ULTRASOUND IMAGING | Facility: IMAGING CENTER | Age: 71
End: 2021-11-29
Payer: COMMERCIAL

## 2021-11-29 DIAGNOSIS — Z00.8 ENCOUNTER FOR OTHER GENERAL EXAMINATION: ICD-10-CM

## 2021-11-29 DIAGNOSIS — Z98.890 OTHER SPECIFIED POSTPROCEDURAL STATES: Chronic | ICD-10-CM

## 2021-11-29 DIAGNOSIS — Z96.653 PRESENCE OF ARTIFICIAL KNEE JOINT, BILATERAL: Chronic | ICD-10-CM

## 2021-11-29 DIAGNOSIS — Z85.828 PERSONAL HISTORY OF OTHER MALIGNANT NEOPLASM OF SKIN: Chronic | ICD-10-CM

## 2021-11-29 DIAGNOSIS — Z90.89 ACQUIRED ABSENCE OF OTHER ORGANS: Chronic | ICD-10-CM

## 2021-11-29 PROCEDURE — 76641 ULTRASOUND BREAST COMPLETE: CPT | Mod: 26,50

## 2021-11-29 PROCEDURE — 76641 ULTRASOUND BREAST COMPLETE: CPT

## 2021-11-29 PROCEDURE — 77066 DX MAMMO INCL CAD BI: CPT

## 2021-11-29 PROCEDURE — G0279: CPT

## 2021-11-29 PROCEDURE — G0279: CPT | Mod: 26

## 2021-11-29 PROCEDURE — 77066 DX MAMMO INCL CAD BI: CPT | Mod: 26

## 2021-12-23 ENCOUNTER — APPOINTMENT (OUTPATIENT)
Dept: NEUROLOGY | Facility: CLINIC | Age: 71
End: 2021-12-23
Payer: COMMERCIAL

## 2021-12-23 VITALS
SYSTOLIC BLOOD PRESSURE: 132 MMHG | BODY MASS INDEX: 46.77 KG/M2 | WEIGHT: 291 LBS | DIASTOLIC BLOOD PRESSURE: 76 MMHG | HEIGHT: 66 IN

## 2021-12-23 PROCEDURE — 99215 OFFICE O/P EST HI 40 MIN: CPT

## 2021-12-23 NOTE — PHYSICAL EXAM
[FreeTextEntry1] : Head:  Normocephalic Neck: Supple nontender no carotid bruits.  Spine:  Nontender negative straight leg raising.\par \par Mental Status:  Alert Oriented X3 Speech normal and no aphasia or dysarthria.\par \par Cranial Nerves: Mild anisocoria and mild right upper eyelid ptosis unchanged.  Fundi normal Visual Fields full  EOMI no diplopia  no nystagmus, V through XII intact.\par \par Motor:  No drift, normal strength tone and coordination and no focal atrophy. No abnormal movements. No dysmetria.  Normal rapid alternating movements. \par \par DTRs: Symmetric hypoactive.  Plantars flexor.  No Clonus.\par \par Sensory:  Normal testing with pin light touch and vibration and  Joint position sense.  Normal DSS to touch.\par \par Gait: Unchanged.\par \par

## 2021-12-23 NOTE — REASON FOR VISIT
[Follow-Up: _____] : a [unfilled] follow-up visit [FreeTextEntry1] : Chronic headache disorder and lower back pain as well as other neurological problems.

## 2021-12-23 NOTE — HISTORY OF PRESENT ILLNESS
[FreeTextEntry1] : Dione Ching is seen for an office visit.  Her headaches have responded to Nurtec 75 mg ODT every other day and for the first 4 to 6 weeks she basically had only 1 headache day.  However in the last 4 days she has had a constant headache which could be due to viral syndrome with aggravation of her underlying sinus disorder.  She has been placed on antibiotics and is improved.  She did have a negative Covid test.  She has used Relpax 40 mg as needed.  She continues to receive propranolol 40 mg twice daily.  Topiramate which she had reduced to 25 mg daily has been stopped.  She has lower back pain and occasional cold sensation in both legs.  She continues to receive acupuncture and chiropractic treatment.  She has declined pain management and epidural steroid injection.  She has had an MRI in the past consistent with lumbar spinal stenosis at the L4-5 level.  Her hemifacial spasm has diminished and there is no change in her Smiley syndrome by history.  Mood is improved.  She is trying to diet and has lost 30 pounds.

## 2021-12-23 NOTE — ASSESSMENT
[FreeTextEntry1] : Impression: This 71-year-old female patient has a chronic migraine headache disorder which is also complicated by chronic sinus condition.  Her condition did seem to improve with Nurtec 75 mg to be taken as needed every other day.  Recent exacerbation could be due to sinusitis.  In the past Ajovy was of no benefit and Aimovig produced constipation.  She continues to have lower back pain with a history of lumbar spinal stenosis.  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  For depression she receives Escitalopram and her mood is improved.  Neurological exam is unchanged.\par \par Recommendations: Continue Nurtec 75 mg ODT to be taken as needed.  Continue Relpax 40 mg as needed.  Continue Escitalopram 20 mg daily.  Continue chiropractic treatment and acupuncture.  Consider lumbar epidural steroid injection by pain management.  Diet lifestyle modification.  Office follow-up in 2 months.\par

## 2021-12-26 ENCOUNTER — OUTPATIENT (OUTPATIENT)
Dept: OUTPATIENT SERVICES | Facility: HOSPITAL | Age: 71
LOS: 1 days | Discharge: HOME | End: 2021-12-26

## 2021-12-26 DIAGNOSIS — Z98.890 OTHER SPECIFIED POSTPROCEDURAL STATES: Chronic | ICD-10-CM

## 2021-12-26 DIAGNOSIS — Z85.828 PERSONAL HISTORY OF OTHER MALIGNANT NEOPLASM OF SKIN: Chronic | ICD-10-CM

## 2021-12-26 DIAGNOSIS — C50.919 MALIGNANT NEOPLASM OF UNSPECIFIED SITE OF UNSPECIFIED FEMALE BREAST: ICD-10-CM

## 2021-12-26 DIAGNOSIS — Z90.89 ACQUIRED ABSENCE OF OTHER ORGANS: Chronic | ICD-10-CM

## 2021-12-26 DIAGNOSIS — Z96.653 PRESENCE OF ARTIFICIAL KNEE JOINT, BILATERAL: Chronic | ICD-10-CM

## 2021-12-28 ENCOUNTER — APPOINTMENT (OUTPATIENT)
Dept: HEMATOLOGY ONCOLOGY | Facility: CLINIC | Age: 71
End: 2021-12-28

## 2021-12-29 ENCOUNTER — APPOINTMENT (OUTPATIENT)
Dept: HEMATOLOGY ONCOLOGY | Facility: CLINIC | Age: 71
End: 2021-12-29
Payer: COMMERCIAL

## 2021-12-29 VITALS
DIASTOLIC BLOOD PRESSURE: 69 MMHG | TEMPERATURE: 97.2 F | SYSTOLIC BLOOD PRESSURE: 142 MMHG | WEIGHT: 293 LBS | HEIGHT: 66 IN | RESPIRATION RATE: 17 BRPM | BODY MASS INDEX: 47.09 KG/M2 | OXYGEN SATURATION: 95 % | HEART RATE: 52 BPM

## 2021-12-29 PROCEDURE — 99215 OFFICE O/P EST HI 40 MIN: CPT

## 2021-12-29 NOTE — REASON FOR VISIT
[Follow-Up Visit] : a follow-up [FreeTextEntry2] : follow up for breast cancer on anastrozole for continuity of care with us

## 2021-12-29 NOTE — ASSESSMENT
[FreeTextEntry1] : She is a 72 y/o  F with Stage I right breast invasive ductal carcinoma that is ER/VT positive and [id number] negative s/p lumpectomy and SLN biopsy followed by RT and on anastrozole since 5/2019. She continues to tolerate anastrozole well and compliant with therapy. She is up to date with breast imaging and bone density. Will obtain copy of the bone density from GYN. We reviewed calcium and Vitamin D supplementation along with exercise to maintain bone health. She has no new signs or symptoms of breast cancer recurrence. We reviewed blood work done 10/2021 with her PCP on [facility] [employer]: LFTs and Vitamin D remain WNL on anastrozole. Next follow up in 6 months but earlier if any new symptoms.\par

## 2021-12-29 NOTE — PHYSICAL EXAM
[Restricted in physically strenuous activity but ambulatory and able to carry out work of a light or sedentary nature] : Status 1- Restricted in physically strenuous activity but ambulatory and able to carry out work of a light or sedentary nature, e.g., light house work, office work [Obese] : obese [Normal] : affect appropriate [de-identified] : periareolar scar R breast; no abnl masses or axillary LN palpable  [de-identified] : multiple skin nevus and skin excisions for skin cancer

## 2021-12-29 NOTE — HISTORY OF PRESENT ILLNESS
[Disease: _____________________] : Disease: [unfilled] [T: ___] : T[unfilled] [N: ___] : N[unfilled] [de-identified] : Age 68: right breast cancer\par Screen detected: screening mammogram on 10/12/18 and was found to have questionable architectural distortions of the right breast. She was called back for a diagnostic mammogram on 10/23/18. It showed [id number] hypoechoic mass at 10 o’clock axis. She underwent core needle biopsy on 10/26/18 which revealed focal atypical duct hyperplasia with microcalcifications adjacent to benign breast tissue with dense stroma. She underwent a lumpectomy of the right breast on 12/18/18: well-differentiated invasive ductal carcinoma, 6mm, ER 98% strong, MI 80% moderate to strong, Her2 negative (IHC 0), also with intermediate grade 1mm associated DCIS with focal necrosis identified. Inked margins were uninvolved, however there was invasive cancer present <1 mm from the margin. Lymph nodes were not examined. No LVI present. T1bN0. Breast MRI 1/3/2019 revealed a 7-8 mm indeterminate enhancing mass in the medial breast adjacent to a postoperative hematoma/seroma. Subsequent targeted ultrasound demonstrated a 5 x 4 x 4 mm hypoechoic mass with angular margins at 1:00, areolar margin, adjacent to the postoperative collection. In accordance with this finding, sampling the lesion by fine-needle aspiration was performed, with subsequent ultrasound core needle biopsy. Under ultrasound guidance, in a lateral approach, an 18-gauge needle was introduced into the lesion, which decreased in size but did not completely resolve. Subsequently, a 12-gauge core needle was used to obtain multiple core specimens of the target at 1:00, areolar margin. Core biopsy: organizing hematoma, fat necrosis and negative for mammary epithelium. She completed adjuvant radiation and started anastrozole May 2019. She was followed by Dr Chan and had continuity of care with us on 12/29/2021. \par  [de-identified] : well-differentiated invasive ductal carcinoma, 6mm, ER 98% strong, NM 80% moderate to strong, Her2 negative (IHC 0) [de-identified] : anastrozole 5/2019 to present  [de-identified] : She had her breast imaging done: MRI in April and mammogram/ sonogram in November. Has been having postsurgical pain over the breast. Continues to tolerate anastrozole. Has baseline arthritis that is chronic and has not changed on the anastrozole. She has intermittent right breast pain since the surgery; no new breast change or nipple discharge. She has been getting PT for imbalance: has fallen last month but did not hurt herself. Has rolling walker for stability. She denies any new medications. Had her bone density done with GYN this year.

## 2021-12-29 NOTE — REVIEW OF SYSTEMS
[Fatigue] : fatigue [Joint Pain] : joint pain [Joint Stiffness] : joint stiffness [Negative] : Allergic/Immunologic [Fever] : no fever [Chills] : no chills [Night Sweats] : no night sweats [Recent Change In Weight] : ~T no recent weight change [Muscle Pain] : no muscle pain [Muscle Weakness] : no muscle weakness [Confused] : no confusion [Dizziness] : no dizziness [Fainting] : no fainting [Difficulty Walking] : no difficulty walking [de-identified] : has imbalance and falls on PT

## 2022-01-09 NOTE — VITALS
[Maximal Pain Intensity: 5/10] : 5/10 [Least Pain Intensity: 0/10] : 0/10 [Pain Description/Quality: ___] : Pain description/quality: [unfilled] [Pain Duration: ___] : Pain duration: [unfilled] [Pain Location: ___] : Pain Location: [unfilled] [Pain Interferes with ADLs] : Pain interferes with activities of daily living. [NSAID/Non-Opioid] : NSAID/Non-Opioid [70: Cares for self; unalbe to carry on normal activity or do active work.] : 72: Cares for self; unable to carry on normal activity or do active work. [ECOG Performance Status: 2 - Ambulatory and capable of all self care but unable to carry out any work activities] : Performance Status: 2 - Ambulatory and capable of all self care but unable to carry out any work activities. Up and about more than 50% of waking hours Statement Selected

## 2022-02-14 ENCOUNTER — APPOINTMENT (OUTPATIENT)
Dept: ORTHOPEDIC SURGERY | Facility: CLINIC | Age: 72
End: 2022-02-14

## 2022-02-23 ENCOUNTER — APPOINTMENT (OUTPATIENT)
Dept: ORTHOPEDIC SURGERY | Facility: CLINIC | Age: 72
End: 2022-02-23
Payer: COMMERCIAL

## 2022-02-23 VITALS — WEIGHT: 285 LBS | HEIGHT: 66 IN | BODY MASS INDEX: 45.8 KG/M2

## 2022-02-23 DIAGNOSIS — M48.07 SPINAL STENOSIS, LUMBOSACRAL REGION: ICD-10-CM

## 2022-02-23 PROCEDURE — 99214 OFFICE O/P EST MOD 30 MIN: CPT

## 2022-02-23 NOTE — PHYSICAL EXAM
[Antalgic] : antalgic [Stooped] : stooped [de-identified] : Examination of the lumbar spine reveals no midline tenderness palpation, step-offs, or skin lesions. Decreased range of motion with respect to flexion, extension, lateral bending, and rotation. No tenderness to palpation of the sciatic notch. No tenderness palpation of the bilateral greater trochanters. No pain with passive internal/external rotation of the hips. No instability of bilateral lower extremities.  Negative CAREY. Negative straight leg raise bilaterally. No bowstring. Negative femoral stretch. 5 out of 5 iliopsoas, hip abductors, hips adductors, quadriceps, hamstrings, gastrocsoleus, tibialis anterior, extensor hallucis longus, peroneals. Grossly intact sensation to light touch bilateral lower extremities. 1+ patellar and Achilles reflexes. Downgoing Babinski. No clonus. Intact proprioception. Palpable pulses. No skin lesion and no edema on the right and left lower extremities. [de-identified] : Review of her lumbar spine MRI reveals multilevel oncologic changes.  She appears to have some areas of epidural lipomatosis and mild to moderate stenosis.  She does have moderate to severe stenosis at L4-5 with large facet effusions.

## 2022-02-23 NOTE — HISTORY OF PRESENT ILLNESS
[de-identified] : Ms. NOLAN NEGRETE  is a 71 year old female who presents with a chronic history of back and bilateral leg pain.  Standing is her worst position.  She has been doing acupuncture with mild relief.   Normal bowel and bladder control.   Denies any recent fevers, chills, sweats, weight loss, or infection.\par \par The patients past medical history, past surgical history, medications, allergies, and social history were reviewed by me today with the patient and documented accordingly.  In addition, the patient's family history, which is noncontributory to their visit, was also reviewed.\par

## 2022-02-23 NOTE — DISCUSSION/SUMMARY
[de-identified] : We discussed further treatment options both nonsurgical and surgical.  Overall she feels she is improving with nonsurgical treatment with acupuncture.  We also discussed physical therapy and epidural injections.  She does not feel her symptoms are bad enough to consider surgery at this point.  She will let me know of any changes or worsening of her symptoms.

## 2022-02-24 ENCOUNTER — APPOINTMENT (OUTPATIENT)
Dept: NEUROLOGY | Facility: CLINIC | Age: 72
End: 2022-02-24
Payer: COMMERCIAL

## 2022-02-24 VITALS
BODY MASS INDEX: 45.8 KG/M2 | HEIGHT: 66 IN | HEART RATE: 58 BPM | WEIGHT: 285 LBS | SYSTOLIC BLOOD PRESSURE: 138 MMHG | DIASTOLIC BLOOD PRESSURE: 82 MMHG

## 2022-02-24 DIAGNOSIS — M48.061 SPINAL STENOSIS, LUMBAR REGION WITHOUT NEUROGENIC CLAUDICATION: ICD-10-CM

## 2022-02-24 PROCEDURE — 99215 OFFICE O/P EST HI 40 MIN: CPT

## 2022-03-25 ENCOUNTER — RX RENEWAL (OUTPATIENT)
Age: 72
End: 2022-03-25

## 2022-03-29 ENCOUNTER — RX RENEWAL (OUTPATIENT)
Age: 72
End: 2022-03-29

## 2022-04-29 ENCOUNTER — APPOINTMENT (OUTPATIENT)
Dept: CARDIOLOGY | Facility: CLINIC | Age: 72
End: 2022-04-29
Payer: COMMERCIAL

## 2022-04-29 ENCOUNTER — NON-APPOINTMENT (OUTPATIENT)
Age: 72
End: 2022-04-29

## 2022-04-29 VITALS — OXYGEN SATURATION: 94 % | DIASTOLIC BLOOD PRESSURE: 76 MMHG | HEART RATE: 54 BPM | SYSTOLIC BLOOD PRESSURE: 120 MMHG

## 2022-04-29 PROCEDURE — 93000 ELECTROCARDIOGRAM COMPLETE: CPT

## 2022-04-29 PROCEDURE — 99213 OFFICE O/P EST LOW 20 MIN: CPT

## 2022-04-29 NOTE — PHYSICAL EXAM
[Well Developed] : well developed [Well Nourished] : well nourished [No Acute Distress] : no acute distress [Obese] : obese [Normal Conjunctiva] : normal conjunctiva [Normal Venous Pressure] : normal venous pressure [No Carotid Bruit] : no carotid bruit [Normal S1, S2] : normal S1, S2 [No Murmur] : no murmur [No Rub] : no rub [No Gallop] : no gallop [Clear Lung Fields] : clear lung fields [Good Air Entry] : good air entry [No Respiratory Distress] : no respiratory distress  [Soft] : abdomen soft [Non Tender] : non-tender [No Masses/organomegaly] : no masses/organomegaly [Normal Bowel Sounds] : normal bowel sounds [Normal Gait] : normal gait [No Cyanosis] : no cyanosis [No Clubbing] : no clubbing [No Varicosities] : no varicosities [No Rash] : no rash [No Skin Lesions] : no skin lesions [Moves all extremities] : moves all extremities [No Focal Deficits] : no focal deficits [Normal Speech] : normal speech [Alert and Oriented] : alert and oriented [Normal memory] : normal memory [de-identified] : 2+ edema, with mild pitting

## 2022-04-29 NOTE — HISTORY OF PRESENT ILLNESS
[FreeTextEntry1] : 70 yo woman presents for cardiology followup.\par \par -H/o Breast cancer in Dec 2018 with well-differentiated invasive ductal carcinoma, 6mm, ER 98% strong, [place] 80% moderate to strong, Her2 negative (IHC 0), also with intermediate grade 1mm associated DCIS with focal necrosis identified. Inked margins were uninvolved, however there was invasive cancer present <1 mm from the margin. T1bN0. She was treated with surgery, lumpectomy and lymph notes; XRT; no chemotherapy. She was treated with anastrazole. \par -Migraine h/a, obesity and depression. She takes plaquenil for systemic arthritis.\par -No h/o HTN. Not on statin therapy; borderline DM, hypothyroid\par \par Echo 2020: normal LVEF, 62%, with mild diastolic dysfunction, mildly dilated left atrium, mild mitral regurgitation, normal right heart size and function. \par \par At her last visit with me in Nov 2021, the following issues were discussed:\par Diastolic heart failure (428.30) (I50.30)\par  · Numerous risk factors for HFpEF. No rales on exam, but LE edema. At last visit,\par     suggested trial of low dose Lasix. She did not get much effect. I have offered to trial a\par     higher dose, say 40 mg Lasix, as needed.\par Hyperlipidemia (272.4) (E78.5)\par  · Lipid levels are reasonable. However, her 10 yr ASCVD risk is 7.2%, suggesting a benefit\par     from statin therapy. We discussed this at length. She is very interested in starting this.\par     Risks:benefits discussed.\par Hypothyroidism (244.9) (E03.9)\par  · She continues with T-4 replacement; labs checked, with in-range levels.\par Obesity (BMI 30-39.9) (278.00) (E66.9)\par  · Has lost some weight. Encouraged continued weight loss.\par Prediabetes (790.29) (R73.03)\par  · Last A1c was WNL.\par Shortness of breath on exertion (786.05) (R06.02)\par  · Recent issue of cough, SOB, most likely bronchitis, has resolved. No over cardiac\par     issues. Echo checked last time c/w normal LVEF, some diastolic dysfunction.\par \par ECG today:\par Sinus bradycardia  \par First degree A-V block \par RSR(V1) \par Nonspecific IVCD\par Nonspecific ST/T abnormalities\par Late transition\par ABNORMAL ECG\par \par Labs October 2021:\par A1c 5.6%\par TSH normal\par creat 1.03\par LFT's normal\par chol 182, HDL 62, trig 76,  mg/dL\par \par She continues to use the Lasix intermittently (2X per week), which seems to be helping her fluid balance and LE edema. She seems to get a reasonable diuretic effect from the diuretic. She is due to see her PCP for labs/follow up soon. No SOB, PND, orthopnea, palpitations, syncope, chest pain. Chronic LE edema.\par

## 2022-05-12 ENCOUNTER — OUTPATIENT (OUTPATIENT)
Dept: OUTPATIENT SERVICES | Facility: HOSPITAL | Age: 72
LOS: 1 days | End: 2022-05-12
Payer: COMMERCIAL

## 2022-05-12 ENCOUNTER — APPOINTMENT (OUTPATIENT)
Dept: MRI IMAGING | Facility: IMAGING CENTER | Age: 72
End: 2022-05-12
Payer: COMMERCIAL

## 2022-05-12 DIAGNOSIS — Z90.89 ACQUIRED ABSENCE OF OTHER ORGANS: Chronic | ICD-10-CM

## 2022-05-12 DIAGNOSIS — Z96.653 PRESENCE OF ARTIFICIAL KNEE JOINT, BILATERAL: Chronic | ICD-10-CM

## 2022-05-12 DIAGNOSIS — Z98.890 OTHER SPECIFIED POSTPROCEDURAL STATES: Chronic | ICD-10-CM

## 2022-05-12 DIAGNOSIS — Z85.828 PERSONAL HISTORY OF OTHER MALIGNANT NEOPLASM OF SKIN: Chronic | ICD-10-CM

## 2022-05-12 DIAGNOSIS — Z00.8 ENCOUNTER FOR OTHER GENERAL EXAMINATION: ICD-10-CM

## 2022-05-12 PROCEDURE — A9585: CPT

## 2022-05-12 PROCEDURE — C8908: CPT

## 2022-05-12 PROCEDURE — 77049 MRI BREAST C-+ W/CAD BI: CPT | Mod: 26

## 2022-05-12 PROCEDURE — C8937: CPT

## 2022-05-24 ENCOUNTER — APPOINTMENT (OUTPATIENT)
Dept: NEUROLOGY | Facility: CLINIC | Age: 72
End: 2022-05-24
Payer: COMMERCIAL

## 2022-05-24 VITALS
HEIGHT: 66 IN | DIASTOLIC BLOOD PRESSURE: 82 MMHG | BODY MASS INDEX: 45 KG/M2 | HEART RATE: 62 BPM | SYSTOLIC BLOOD PRESSURE: 130 MMHG | WEIGHT: 280 LBS

## 2022-05-24 PROCEDURE — 99215 OFFICE O/P EST HI 40 MIN: CPT

## 2022-05-24 NOTE — REASON FOR VISIT
[Follow-Up: _____] : a [unfilled] follow-up visit [FreeTextEntry1] : Chronic migraine headache disorder lower back pain as well as other neurological problems

## 2022-05-24 NOTE — HISTORY OF PRESENT ILLNESS
[FreeTextEntry1] : Dione Ching is seen for an office visit.  Her headaches remain improved with Nurtec 75 mg ODT every other day.  She is not having any side effects.  She occasionally uses Relpax 40 mg as needed and she continues to receive propranolol 40 mg twice daily.  She is no longer taking topiramate.  For her lower back she saw Dr. Garay a spine surgeon who agreed with conservative management including acupuncture and massage therapy.  She is not receiving chiropractic treatment or physical therapy at this time.  She has been dieting and has lost approximately 35 pounds.  Epidural steroid injections to be deferred at this juncture since there is improvement with conservative management.  She denies recurrence of hemifacial spasm.  Mood is improved and she takes Lexapro 20 mg daily.  Medications have been reviewed in detail.

## 2022-05-24 NOTE — ASSESSMENT
[FreeTextEntry1] : Impression: This 71-year-old female patient has a chronic migraine headache disorder and also a chronic sinus condition.  Her migraine headache disorder is improved with Nurtec 75 mg taken every other day.  In the past Ajovy was of no benefit and Aimovig produced constipation.  Her low back syndrome is improved with conservative management with underlying lumbar spinal stenosis and the comorbidity of obesity.  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  For her mood she receives Escitalopram and there is improvement.  Neurological exam is stable.\par \par Recommendations: Continue Nurtec 75 mg ODT to be taken every other day.  Continue Relpax 40 mg as needed Escitalopram 20 mg daily.  Propranolol 40 mg twice daily.  Agree with acupuncture and massage therapy and spine surgical follow-up as needed.  Lifestyle modification weight reduction.  Office follow-up in 3 months.\par

## 2022-05-24 NOTE — ASSESSMENT
[FreeTextEntry1] : Impression: This 71-year-old female has a chronic migraine headache disorder.  Recently she has had episodes of binocular visual disturbance consistent with ocular migraine.  She also has a chronic sinus condition.  Migraine headache disorder is improved with Nurtec 75 mg every other day.  The past there was no improvement with Ajovy and Aimovig produced constipation.  She has had no recurrence of hemifacial spasm.  Today pupils appear equal and reactive though there is a mild right upper eyelid ptosis.  Her chronic lower back pain is moderated.  Her mood remains improved with escitalopram.  Neurological exam is stable.\par \par Recommendations: Continue Nurtec 75 mg ODT every other day.  Relpax 40 mg as needed as directed.  Escitalopram 20 mg daily.  Propranolol 40 mg twice daily.  Acupuncture massage therapy.  Lifestyle modification weight reduction.  Office follow-up in 3 months.  ENT follow-up.\par

## 2022-05-24 NOTE — PHYSICAL EXAM
[FreeTextEntry1] : Head:  Normocephalic Neck: Supple nontender no carotid bruits.  \par \par Mental Status:  Alert Oriented X3 Speech normal and no aphasia or dysarthria.\par \par Cranial Nerves:  PERRL, Fundi normal Visual Fields full  EOMI no diplopia mild right upper eyelid ptosis, no nystagmus, V through XII intact.  No recurrent facial spasm.\par \par Motor:  No drift, normal strength tone and coordination and no focal atrophy. No abnormal movements. No dysmetria.  Normal rapid alternating movements. \par \par DTRs: Symmetric hypoactive plantars flexor.  No Clonus.\par \par Sensory: Unchanged.\par \par Gait: Unchanged.\par

## 2022-05-24 NOTE — HISTORY OF PRESENT ILLNESS
[FreeTextEntry1] : This patient is seen for an office visit.  Her headaches have definitely improved especially in the last 2 months with Nurtec 75 mg ODT every other day.  She will occasionally take Relpax 40 mg.  This will be taken as needed and she has been instructed not to take it for any more than 2 to 3 days at a time.  She also receives propranolol 40 mg twice daily.  She will occasionally use Tylenol.  For her lower back condition she receives meloxicam  once daily.  She has sinus issues and plans to follow with her ENT consultant.  There has been weight loss.  She has pursued acupuncture and massage.  She has not had any further follow-up with spine surgery.  She denies any recurrence of facial twitching.  Her mood remains improved with Lexapro 20 mg once daily.\par \par The last 2 months she has had binocular visual symptomatology consisting of flashing lights triangles suggestive of ocular migraine and without associated headache.

## 2022-06-13 ENCOUNTER — NON-APPOINTMENT (OUTPATIENT)
Age: 72
End: 2022-06-13

## 2022-06-14 ENCOUNTER — TRANSCRIPTION ENCOUNTER (OUTPATIENT)
Age: 72
End: 2022-06-14

## 2022-06-15 ENCOUNTER — OUTPATIENT (OUTPATIENT)
Dept: OUTPATIENT SERVICES | Facility: HOSPITAL | Age: 72
LOS: 1 days | End: 2022-06-15

## 2022-06-15 ENCOUNTER — APPOINTMENT (OUTPATIENT)
Dept: DISASTER EMERGENCY | Facility: HOSPITAL | Age: 72
End: 2022-06-15

## 2022-06-15 VITALS
OXYGEN SATURATION: 94 % | DIASTOLIC BLOOD PRESSURE: 77 MMHG | SYSTOLIC BLOOD PRESSURE: 125 MMHG | HEART RATE: 49 BPM | TEMPERATURE: 98 F | RESPIRATION RATE: 17 BRPM

## 2022-06-15 VITALS
DIASTOLIC BLOOD PRESSURE: 76 MMHG | SYSTOLIC BLOOD PRESSURE: 116 MMHG | OXYGEN SATURATION: 95 % | HEART RATE: 48 BPM | TEMPERATURE: 99 F | RESPIRATION RATE: 16 BRPM

## 2022-06-15 DIAGNOSIS — Z98.890 OTHER SPECIFIED POSTPROCEDURAL STATES: Chronic | ICD-10-CM

## 2022-06-15 DIAGNOSIS — Z90.89 ACQUIRED ABSENCE OF OTHER ORGANS: Chronic | ICD-10-CM

## 2022-06-15 DIAGNOSIS — Z85.828 PERSONAL HISTORY OF OTHER MALIGNANT NEOPLASM OF SKIN: Chronic | ICD-10-CM

## 2022-06-15 DIAGNOSIS — U07.1 COVID-19: ICD-10-CM

## 2022-06-15 DIAGNOSIS — Z96.653 PRESENCE OF ARTIFICIAL KNEE JOINT, BILATERAL: Chronic | ICD-10-CM

## 2022-06-15 RX ORDER — BEBTELOVIMAB 87.5 MG/ML
175 INJECTION, SOLUTION INTRAVENOUS ONCE
Refills: 0 | Status: COMPLETED | OUTPATIENT
Start: 2022-06-15 | End: 2022-06-15

## 2022-06-15 RX ORDER — SODIUM CHLORIDE 9 MG/ML
250 INJECTION INTRAMUSCULAR; INTRAVENOUS; SUBCUTANEOUS ONCE
Refills: 0 | Status: COMPLETED | OUTPATIENT
Start: 2022-06-15 | End: 2022-06-15

## 2022-06-15 RX ADMIN — BEBTELOVIMAB 175 MILLIGRAM(S): 87.5 INJECTION, SOLUTION INTRAVENOUS at 09:15

## 2022-06-15 RX ADMIN — SODIUM CHLORIDE 500 MILLILITER(S): 9 INJECTION INTRAMUSCULAR; INTRAVENOUS; SUBCUTANEOUS at 09:30

## 2022-06-15 NOTE — MONOCLONAL ANTIBODY INFUSION - ASSESSMENT AND PLAN
ASSESSMENT:  Pt is a 71y Female recently diagnosed with Covid-19 infection who was referred for monoclonal antibody (Bebtelovimab) infusion after testing positive for COVID-19 on 6/13    PLAN:    I have reviewed the Bebtelovimab Emergency Use Authorization (EUA) and I have provided the patient or patient's caregiver with the following information:    1. The FDA has authorized emergency use of Bebtelovimab, it is not an FDA approved biological product & research is ongoing.  2. The patient or patient's caregiver has the option to accept or refuse administration of Bebtelovimab.  3. The significant known and potential risks and benefits of Bebtelovimab and the extent to which such risks and benefits are unknown.  4. Information on available alternative treatments and the risks/benefits of those alternatives.  5. Patient instructed to self-isolate & use infection control measures (e.g wear mask, isolate, social distance, avoid sharing personal items, clean & disinfect "high touch" surfaces, & frequent handwashing) according to the CDC guidelines.     - Injection procedure explained to patient  - Consent for mAb injection obtained; Patient verbalized understanding of plan and agrees to treatment  - Risk & benefits discussed/all questions answered to the best of my ability  - Bebtelovimab 175mg IVP x1 over ~ 1 minute  - Observe patient for one hour post medication administration  - D/C patient with fact sheet explaining symptoms to be aware of for the next couple of days along with contact information for the 24/7 clinical call center should they experience any symptoms  - Outpatient PCP follow up for further management recommendations ASSESSMENT:  Pt is a 71y Female recently diagnosed with Covid-19 infection who was referred for monoclonal antibody (Bebtelovimab) infusion after testing positive for COVID-19 on 6/13    PLAN:    I have reviewed the Bebtelovimab Emergency Use Authorization (EUA) and I have provided the patient or patient's caregiver with the following information:    1. The FDA has authorized emergency use of Bebtelovimab, it is not an FDA approved biological product & research is ongoing.  2. The patient or patient's caregiver has the option to accept or refuse administration of Bebtelovimab.  3. The significant known and potential risks and benefits of Bebtelovimab and the extent to which such risks and benefits are unknown.  4. Information on available alternative treatments and the risks/benefits of those alternatives.  5. Patient instructed to self-isolate & use infection control measures (e.g wear mask, isolate, social distance, avoid sharing personal items, clean & disinfect "high touch" surfaces, & frequent handwashing) according to the CDC guidelines.     - Injection procedure explained to patient  - Consent for mAb injection obtained; Patient verbalized understanding of plan and agrees to treatment  - Risk & benefits discussed/all questions answered to the best of my ability  - Bebtelovimab 175mg IVP x1 over ~ 1 minute  - Observe patient for one hour post medication administration  - D/C patient with fact sheet explaining symptoms to be aware of for the next couple of days along with contact information for the 24/7 clinical call center should they experience any symptoms  - Outpatient PCP follow up for further management recommendations        **Addendum  Pt currently post monoclonal antibody treatment with HR 44-48, /80, afebrile. Pt denies any dizziness or lightheadedness. She states her heart rate is usually around 50- 60. Of note, pt looks a little pale (likely from current Covid infection) but worth noting. Called [facility] to speak with her Cardiologist Dr. Mcguire but he was unavailable at this time, but discussed very briefly with Dr. Denny who suggested after completion of treatment that patient go to the Cardiology office there to see one of the other physicians available. Pt stable and does not need to be transferred to the ER but perhaps needs her heart rate rechecked and possible medication adjustment, etc. Explained to patient in depth and she agrees and will have her  take her to the Cardiology office at [address] [place] [address].  -- Digna MCGRAW, PAC-C

## 2022-06-15 NOTE — MONOCLONAL ANTIBODY INFUSION - EXAM
CC: Monoclonal Antibody Infusion/COVID 19 Positive  71y Female w/ PmHx of GERD, rosacea, skin Ca, RA, hypothyroid, OA, spinal stenosis, obesity and recent dx of COVID-19 who presents today for elective Bebtelovimab infusion. Patient has been screened and was deemed to be a candidate by Dr. Leighton Calle.    Symptoms/Criteria: fever Tmax 101.5, body- ache, sore throat, cough,  Date of Symptom Onset:6/11  Date of Positive COVID PCR: 6/13  Risk Profile includes: skin Ca, obesity, RA, OA GERD rosacea, migraines, HLD    Vaccination Status:Moderna x2, pfizer x2      Physical Exam/findings:  T(C): --98.8  HR: --50  BP: --125/77  RR: --18  SpO2: --95    PE:   Appearance: obesity	  HEENT:   Normal oral mucosa, NC/AT  Lymphatic: No lymphadenopathy  Cardiovascular: Normal S1 S2, No JVD, No edema  Respiratory: Lungs clear to auscultation, no accessory muscle use or intracostal retraction  Gastrointestinal:  BS (+), Soft, non-tender, non-distended  Skin: Warm and dry  Neurologic: Non-focal, CN II- XII grossly intact  Extremities: Normal range of motion

## 2022-06-23 ENCOUNTER — OUTPATIENT (OUTPATIENT)
Dept: OUTPATIENT SERVICES | Facility: HOSPITAL | Age: 72
LOS: 1 days | Discharge: HOME | End: 2022-06-23

## 2022-06-23 DIAGNOSIS — Z98.890 OTHER SPECIFIED POSTPROCEDURAL STATES: Chronic | ICD-10-CM

## 2022-06-23 DIAGNOSIS — Z90.89 ACQUIRED ABSENCE OF OTHER ORGANS: Chronic | ICD-10-CM

## 2022-06-23 DIAGNOSIS — Z96.653 PRESENCE OF ARTIFICIAL KNEE JOINT, BILATERAL: Chronic | ICD-10-CM

## 2022-06-23 DIAGNOSIS — Z85.828 PERSONAL HISTORY OF OTHER MALIGNANT NEOPLASM OF SKIN: Chronic | ICD-10-CM

## 2022-06-23 DIAGNOSIS — C50.919 MALIGNANT NEOPLASM OF UNSPECIFIED SITE OF UNSPECIFIED FEMALE BREAST: ICD-10-CM

## 2022-06-28 ENCOUNTER — APPOINTMENT (OUTPATIENT)
Dept: HEMATOLOGY ONCOLOGY | Facility: CLINIC | Age: 72
End: 2022-06-28

## 2022-06-29 ENCOUNTER — APPOINTMENT (OUTPATIENT)
Dept: HEMATOLOGY ONCOLOGY | Facility: CLINIC | Age: 72
End: 2022-06-29
Payer: COMMERCIAL

## 2022-06-29 VITALS
RESPIRATION RATE: 18 BRPM | WEIGHT: 279.98 LBS | TEMPERATURE: 97 F | HEIGHT: 66 IN | BODY MASS INDEX: 45 KG/M2 | HEART RATE: 52 BPM | DIASTOLIC BLOOD PRESSURE: 82 MMHG | OXYGEN SATURATION: 95 % | SYSTOLIC BLOOD PRESSURE: 133 MMHG

## 2022-06-29 PROCEDURE — 99214 OFFICE O/P EST MOD 30 MIN: CPT

## 2022-06-29 RX ORDER — KETOCONAZOLE 20 MG/G
2 CREAM TOPICAL
Qty: 60 | Refills: 0 | Status: ACTIVE | COMMUNITY
Start: 2022-05-31

## 2022-06-29 NOTE — PHYSICAL EXAM
[Restricted in physically strenuous activity but ambulatory and able to carry out work of a light or sedentary nature] : Status 1- Restricted in physically strenuous activity but ambulatory and able to carry out work of a light or sedentary nature, e.g., light house work, office work [Obese] : obese [Normal] : affect appropriate [de-identified] : periareolar scar R breast; no abnl masses or axillary LN palpable  [de-identified] : multiple skin nevus and skin excisions for skin cancer

## 2022-06-29 NOTE — REVIEW OF SYSTEMS
[Fatigue] : fatigue [Joint Pain] : joint pain [Negative] : Allergic/Immunologic [Fever] : no fever [Chills] : no chills [Night Sweats] : no night sweats [Recent Change In Weight] : ~T no recent weight change [Joint Stiffness] : no joint stiffness [Muscle Pain] : no muscle pain [Muscle Weakness] : no muscle weakness [FreeTextEntry9] : lower back pain

## 2022-06-29 NOTE — HISTORY OF PRESENT ILLNESS
[Disease: _____________________] : Disease: [unfilled] [T: ___] : T[unfilled] [N: ___] : N[unfilled] [de-identified] : Age 68: right breast cancer\par Screen detected: screening mammogram on 10/12/18 and was found to have questionable architectural distortions of the right breast. She was called back for a diagnostic mammogram on 10/23/18. It showed 0m5p1yx hypoechoic mass at 10 o’clock axis. She underwent core needle biopsy on 10/26/18 which revealed focal atypical duct hyperplasia with microcalcifications adjacent to benign breast tissue with dense stroma. She underwent a lumpectomy of the right breast on 12/18/18: well-differentiated invasive ductal carcinoma, 6mm, ER 98% strong, [place] 80% moderate to strong, Her2 negative (IHC 0), also with intermediate grade 1mm associated DCIS with focal necrosis identified. Inked margins were uninvolved, however there was invasive cancer present <1 mm from the margin. Lymph nodes were not examined. No LVI present. T1bN0. Breast MRI 1/3/2019 revealed a 7-8 mm indeterminate enhancing mass in the medial breast adjacent to a postoperative hematoma/seroma. Subsequent targeted ultrasound demonstrated a 5 x 4 x 4 mm hypoechoic mass with angular margins at 1:00, areolar margin, adjacent to the postoperative collection. In accordance with this finding, sampling the lesion by fine-needle aspiration was performed, with subsequent ultrasound core needle biopsy. Under ultrasound guidance, in a lateral approach, an 18-gauge needle was introduced into the lesion, which decreased in size but did not completely resolve. Subsequently, a 12-gauge core needle was used to obtain multiple core specimens of the target at 1:00, areolar margin. Core biopsy: organizing hematoma, fat necrosis and negative for mammary epithelium. She completed adjuvant radiation and started anastrozole May 2019. She was followed by Dr Chan and had continuity of care with us on 12/29/2021. \par  [de-identified] : well-differentiated invasive ductal carcinoma, 6mm, ER 98% strong, MS 80% moderate to strong, Her2 negative (IHC 0) [de-identified] : anastrozole 5/2019 to present  [de-identified] : Since last evaluation, she has started Nurtec: has helped with migraines. Having more back trouble with spinal stenosis and getting acupuncture. Denies any new breast pain or chest wall changes. No back pain, cough or HA. Had covid 4 weeks ago and still feeling more tired.

## 2022-06-29 NOTE — ASSESSMENT
[FreeTextEntry1] : She is a 70 y/o  F with Stage I right breast invasive ductal carcinoma that is ER/IA positive and Thu2noc negative s/p lumpectomy and SLN biopsy followed by RT and on anastrozole since 5/2019. She continues to tolerate anastrozole well. We reviewed signs and symptoms of breast cancer recurrence. No new signs or symptoms of recurrence. We reviewed calcium and Vitamin D supplementation along with exercise to maintain bone health: last bone density 2021 and WNL; next in 2023. We reviewed low fat diet and exercise daily to help improve breast cancer survival as per WINS study. Reviewed blood work done 6/1/2022 with LFTs WNL. Next follow up in 6 months but earlier if any new symptoms.\par \par \par

## 2022-07-11 ENCOUNTER — APPOINTMENT (OUTPATIENT)
Dept: OTOLARYNGOLOGY | Facility: CLINIC | Age: 72
End: 2022-07-11

## 2022-07-11 VITALS — DIASTOLIC BLOOD PRESSURE: 73 MMHG | HEART RATE: 52 BPM | SYSTOLIC BLOOD PRESSURE: 115 MMHG

## 2022-07-11 VITALS — BODY MASS INDEX: 44.2 KG/M2 | HEIGHT: 66 IN | WEIGHT: 275 LBS

## 2022-07-11 DIAGNOSIS — H90.5 UNSPECIFIED SENSORINEURAL HEARING LOSS: ICD-10-CM

## 2022-07-11 PROCEDURE — 92557 COMPREHENSIVE HEARING TEST: CPT

## 2022-07-11 PROCEDURE — 92567 TYMPANOMETRY: CPT

## 2022-07-11 PROCEDURE — 99213 OFFICE O/P EST LOW 20 MIN: CPT | Mod: 25

## 2022-07-11 RX ORDER — FAMOTIDINE 40 MG/1
TABLET, FILM COATED ORAL
Refills: 0 | Status: ACTIVE | COMMUNITY

## 2022-07-11 RX ORDER — OMEPRAZOLE MAGNESIUM 20 MG/1
20 TABLET, DELAYED RELEASE ORAL
Refills: 0 | Status: COMPLETED | COMMUNITY
End: 2022-07-11

## 2022-07-11 RX ORDER — LEVOFLOXACIN 500 MG/1
500 TABLET, FILM COATED ORAL
Qty: 7 | Refills: 0 | Status: COMPLETED | COMMUNITY
Start: 2021-12-15 | End: 2022-07-11

## 2022-07-15 PROBLEM — H90.5 HEARING DISORDER, COCHLEAR: Status: ACTIVE | Noted: 2021-07-19

## 2022-07-15 NOTE — PHYSICAL EXAM
[Midline] : trachea located in midline position [Normal] : no rashes [] : Cam[place]-Hallpike test is negative

## 2022-07-15 NOTE — HISTORY OF PRESENT ILLNESS
[de-identified] : 71 year old female here for follow up for hearing loss. reports hearing is stable since last visit. Denies any recent vertigo episodes. Reports intermittent dizziness that lasts minutes to about an hour and resolves on its own-(4 episodes) treated with Dramamine with relief. Reports migraines over the last 3 days- continues taking Nurtec with relief. Denies otalgia, otorrhea and tinnitus.

## 2022-08-08 ENCOUNTER — APPOINTMENT (OUTPATIENT)
Dept: SURGERY | Facility: CLINIC | Age: 72
End: 2022-08-08

## 2022-08-08 PROCEDURE — 99213K: CUSTOM

## 2022-08-25 ENCOUNTER — APPOINTMENT (OUTPATIENT)
Dept: NEUROLOGY | Facility: CLINIC | Age: 72
End: 2022-08-25

## 2022-08-25 VITALS
HEIGHT: 66 IN | WEIGHT: 275 LBS | HEART RATE: 60 BPM | BODY MASS INDEX: 44.2 KG/M2 | SYSTOLIC BLOOD PRESSURE: 146 MMHG | DIASTOLIC BLOOD PRESSURE: 82 MMHG

## 2022-08-25 DIAGNOSIS — M54.16 RADICULOPATHY, LUMBAR REGION: ICD-10-CM

## 2022-08-25 PROCEDURE — 99214 OFFICE O/P EST MOD 30 MIN: CPT

## 2022-08-25 NOTE — ASSESSMENT
[FreeTextEntry1] : Impression: This 71-year-old female patient has a chronic migraine headache disorder which is improved with Nurtec 75 mg every other day.  She has a history of lumbar radiculopathy.  This condition is basically stable.  The right sided hemifacial spasm has essentially almost completely resolved.  There is a past history suggesting Smiley syndrome which is less apparent and the work-up was nondiagnostic.\par \par Recommendations: As suggested by her rheumatologist agree with a trial of duloxetine beginning at 30 mg daily.  Lexapro will be further reduced to 5mg once a day for a week and then discontinued.  Continue Nurtec 75 mg ODT every other day Relpax 40 mg as needed propranolol 40 mg twice daily.  Office follow-up.\par

## 2022-08-25 NOTE — PHYSICAL EXAM
[FreeTextEntry1] : Head:  Normocephalic Neck: Supple nontender no carotid bruits.  Spine:  Nontender negative straight leg raising.\par \par Mental Status:  Alert Oriented X3 Speech normal and no aphasia or dysarthria.\par \par Cranial Nerves: Mild anisocoria which is chronic., Fundi not visualized.  Visual Fields full  EOMI no diplopia no ptosis no nystagmus, V through XII intact.\par \par Motor:  No drift, normal strength tone and coordination and no focal atrophy. No abnormal movements. No dysmetria.  Normal rapid alternating movements. \par \par DTRs: Hypoactive unchanged..  Plantars flexor.  No Clonus.\par \par Sensory: No lateralizing findings.\par \par Gait: Regular unchanged.\par

## 2022-08-25 NOTE — HISTORY OF PRESENT ILLNESS
[FreeTextEntry1] : This patient is seen for an office visit.  Her headaches remain improved while taking Nurtec 75 mg ODT every other day.  Only occasionally does she use Relpax 40 mg as needed.  She also receives propranolol 40 mg twice daily and occasional Tylenol.  She has some chronic lower back pain radiating into the left lower extremity.  She has multifocal pain for which she sees a rheumatologist.  Her Plaquenil has been stopped and her rheumatologist suggest she reduce the dose of meloxicam.  \par \par She denies any recurrent visual complaints.  She is not having recurrent vertigo\par \par Her rheumatologist is also suggesting that she switch from Lexapro to duloxetine which might provide some additional pain relief for her rheumatological disorder.\par \par She does have a history of anxiety and depression.  She started to reduce the Lexapro from 20 mg to 10 mg.\par \par She has only rare occurrences of facial twitching.  She has a history of a chronic right Smiley syndrome.

## 2022-08-31 ENCOUNTER — APPOINTMENT (OUTPATIENT)
Dept: ULTRASOUND IMAGING | Facility: CLINIC | Age: 72
End: 2022-08-31

## 2022-08-31 ENCOUNTER — OUTPATIENT (OUTPATIENT)
Dept: OUTPATIENT SERVICES | Facility: HOSPITAL | Age: 72
LOS: 1 days | End: 2022-08-31
Payer: COMMERCIAL

## 2022-08-31 DIAGNOSIS — Z96.653 PRESENCE OF ARTIFICIAL KNEE JOINT, BILATERAL: Chronic | ICD-10-CM

## 2022-08-31 DIAGNOSIS — Z85.828 PERSONAL HISTORY OF OTHER MALIGNANT NEOPLASM OF SKIN: Chronic | ICD-10-CM

## 2022-08-31 DIAGNOSIS — Z90.89 ACQUIRED ABSENCE OF OTHER ORGANS: Chronic | ICD-10-CM

## 2022-08-31 DIAGNOSIS — Z98.890 OTHER SPECIFIED POSTPROCEDURAL STATES: Chronic | ICD-10-CM

## 2022-08-31 DIAGNOSIS — Z00.8 ENCOUNTER FOR OTHER GENERAL EXAMINATION: ICD-10-CM

## 2022-08-31 PROCEDURE — 76882 US LMTD JT/FCL EVL NVASC XTR: CPT | Mod: 26,LT

## 2022-08-31 PROCEDURE — 76882 US LMTD JT/FCL EVL NVASC XTR: CPT

## 2022-09-19 ENCOUNTER — RX RENEWAL (OUTPATIENT)
Age: 72
End: 2022-09-19

## 2022-11-10 ENCOUNTER — NON-APPOINTMENT (OUTPATIENT)
Age: 72
End: 2022-11-10

## 2022-11-10 ENCOUNTER — APPOINTMENT (OUTPATIENT)
Dept: CARDIOLOGY | Facility: CLINIC | Age: 72
End: 2022-11-10

## 2022-11-10 VITALS
DIASTOLIC BLOOD PRESSURE: 75 MMHG | BODY MASS INDEX: 43.87 KG/M2 | WEIGHT: 273 LBS | HEIGHT: 66 IN | HEART RATE: 60 BPM | SYSTOLIC BLOOD PRESSURE: 123 MMHG | OXYGEN SATURATION: 97 %

## 2022-11-10 PROCEDURE — 99214 OFFICE O/P EST MOD 30 MIN: CPT | Mod: 25

## 2022-11-10 PROCEDURE — 93000 ELECTROCARDIOGRAM COMPLETE: CPT

## 2022-11-10 RX ORDER — HYDROXYCHLOROQUINE SULFATE 200 MG/1
200 TABLET ORAL
Refills: 0 | Status: DISCONTINUED | COMMUNITY
End: 2022-11-10

## 2022-11-10 NOTE — HISTORY OF PRESENT ILLNESS
[FreeTextEntry1] : 73 yo woman presents for cardiology followup.\par \par -H/o Breast cancer in Dec 2018 with well-differentiated invasive ductal carcinoma, 6mm, ER 98% strong, [place] 80% moderate to strong, Her2 negative (IHC 0), also with intermediate grade 1mm associated DCIS with focal necrosis identified. Inked margins were uninvolved, however there was invasive cancer present <1 mm from the margin. T1bN0. She was treated with surgery, lumpectomy and lymph notes; XRT; no chemotherapy. She was treated with anastrazole. \par -Migraine h/a, obesity and depression (no longer on medication). \par -She takes doxycycline (previously plaquenil) for systemic arthritis.\par -No h/o HTN. Not on statin therapy; borderline DM, hypothyroid\par \par Echo 2020: normal LVEF, 62%, with mild diastolic dysfunction, mildly dilated left atrium, mild mitral regurgitation, normal right heart size and function. \par \par Labs October 2021:\par A1c 5.6%\par TSH normal\par creat 1.03\par LFT's normal\par chol 182, HDL 62, trig 76,  mg/dL\par \par At her last visit with me in April 2022, the following issues were discussed:\par Diastolic heart failure (428.30) (I50.30)\par  · Numerous risk factors for HFpEF. LE edema persists. Suggest continued, if not\par     increased, diuretic use. She should follow her daily weights, and shoot for a slow/steady\par     decline in edema/weight. She was instructed to limit her free water and salt intake. She\par     is due to get labs at her PCP office shortly.\par Hyperlipidemia (272.4) (E78.5)\par  · Continues with statin use given DM / increase CV risk.\par Hypothyroidism (244.9) (E03.9)\par  · She continues with T-4 replacement; labs to be checked by PCP soon.\par Obesity (BMI 30-39.9) (278.00) (E66.9)\par  · Has lost some weight. Encouraged continued weight loss.\par Prediabetes (790.29) (R73.03)\par  · Monitoring her blood sugar.\par Shortness of breath on exertion (786.05) (R06.02)\par  · No over cardiac issues. Echo checked last time c/w normal LVEF, some diastolic\par     dysfunction. BUCKLEY likely from deconditioning.\par \par Since her last visit, has been stable from a CV standpoint. No hospitalizations. She had COVID last June; improved since then. Taking furosemide roughly every other day. Now, off Plaquenil (was taking it for arthritis). Continues with planned weight loss; near goal of 50 lbs. Improved nerve pain; not much exercise, just diet control. Recent labs; will f/u with PCP this week.\par \par ECG today:\par Sinus rhythm \par RSR(V1), nondiagnostic\par Late transition\par OTHERWISE NORMAL ECG\par \par \par \par

## 2022-11-10 NOTE — PHYSICAL EXAM
[Well Developed] : well developed [Well Nourished] : well nourished [No Acute Distress] : no acute distress [Obese] : obese [Normal Conjunctiva] : normal conjunctiva [Normal Venous Pressure] : normal venous pressure [No Carotid Bruit] : no carotid bruit [Normal S1, S2] : normal S1, S2 [No Murmur] : no murmur [No Rub] : no rub [No Gallop] : no gallop [Clear Lung Fields] : clear lung fields [Good Air Entry] : good air entry [No Respiratory Distress] : no respiratory distress  [Soft] : abdomen soft [Non Tender] : non-tender [No Masses/organomegaly] : no masses/organomegaly [Normal Bowel Sounds] : normal bowel sounds [Normal Gait] : normal gait [No Cyanosis] : no cyanosis [No Clubbing] : no clubbing [No Varicosities] : no varicosities [No Rash] : no rash [No Skin Lesions] : no skin lesions [Moves all extremities] : moves all extremities [No Focal Deficits] : no focal deficits [Normal Speech] : normal speech [Alert and Oriented] : alert and oriented [Normal memory] : normal memory [de-identified] : 1+ edema, with mild pitting (improved from prior)

## 2022-11-28 ENCOUNTER — APPOINTMENT (OUTPATIENT)
Dept: NEUROLOGY | Facility: CLINIC | Age: 72
End: 2022-11-28

## 2022-11-28 VITALS
HEART RATE: 64 BPM | BODY MASS INDEX: 43.55 KG/M2 | WEIGHT: 271 LBS | DIASTOLIC BLOOD PRESSURE: 76 MMHG | SYSTOLIC BLOOD PRESSURE: 126 MMHG | HEIGHT: 66 IN

## 2022-11-28 PROCEDURE — 99215 OFFICE O/P EST HI 40 MIN: CPT

## 2022-11-28 NOTE — HISTORY OF PRESENT ILLNESS
[FreeTextEntry1] : This patient is seen for an office visit.  She has been doing well since taking Nurtec 75 mg ODT every other day and she would use Relpax 40 mg as needed and propranolol 40 mg twice daily.  In this setting she did have another sinus surgery approximately 1 month ago at [facility].  Following that procedure she has had an increase in headache frequency and severity taking increasing numbers of Relpax which she was using rarely prior to the procedure.  She was also given an antibiotic post procedure she is scheduled to see the ENT physician later this week\par \par .  She has been taking duloxetine 30 mg once daily in place of Lexapro which was stopped and this is helped her multifocal musculoskeletal pain prescribed by her rheumatologist.  She has not been taking the meloxicam since beginning duloxetine.\par \par She denies any recurrence of hemifacial spasm.  She is not having any recurrent vertigo.  She is not having any visual complaints or any other neurological focality.  Her lower back pain is moderated.\par \par

## 2022-11-28 NOTE — ASSESSMENT
[FreeTextEntry1] : Impression: This 72-year-old female patient has a chronic migraine headache disorder which has improved with Nurtec 75 mg every other day.  There has been an exacerbation following sinus surgery which was performed 1 months ago.  She denies recurrence of vertigo which she has had in the past.  She is no longer having episodes of right hemifacial spasm.  Her low back pain has moderated.  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  Neurological exam is otherwise unremarkable.  She has multifocal pain for which she sees a rheumatologist and this is moderated with duloxetine.\par \par Recommendations: Continue Nurtec 75 mg ODT every other day, Relpax 40 mg as needed, propranolol 40 mg twice daily, duloxetine 30 mg once daily.  Meloxicam could be taken once daily basis only as needed and she will be following with rheumatology in this regard.  Office visit in 3 months.  She will call me regarding her headache frequency and severity which did increase recently following the sinus surgery.\par

## 2022-11-28 NOTE — PHYSICAL EXAM
[FreeTextEntry1] : Head:  Normocephalic Neck: Supple nontender no carotid bruits.  \par \par Mental Status:  Alert Oriented X3 Speech normal and no aphasia or dysarthria.\par \par Cranial Nerves: Mild anisocoria and right upper eyelid ptosis.  No recurrence of hemifacial spasm.  Extraocular movements full no nystagmus.  Remainder the cranial nerves normal. \par \par Motor:  No drift, normal strength tone and coordination and no focal atrophy. No abnormal movements. No dysmetria.  Normal rapid alternating movements. \par \par DTRs: Hypoactive..  Plantars flexor.  No Clonus.\par \par Sensory: No lateralizing findings.\par \par Gait: Unremarkable.\par

## 2022-12-21 ENCOUNTER — OUTPATIENT (OUTPATIENT)
Dept: OUTPATIENT SERVICES | Facility: HOSPITAL | Age: 72
LOS: 1 days | End: 2022-12-21
Payer: COMMERCIAL

## 2022-12-21 ENCOUNTER — APPOINTMENT (OUTPATIENT)
Dept: MAMMOGRAPHY | Facility: CLINIC | Age: 72
End: 2022-12-21

## 2022-12-21 ENCOUNTER — APPOINTMENT (OUTPATIENT)
Dept: ULTRASOUND IMAGING | Facility: CLINIC | Age: 72
End: 2022-12-21

## 2022-12-21 DIAGNOSIS — Z90.89 ACQUIRED ABSENCE OF OTHER ORGANS: Chronic | ICD-10-CM

## 2022-12-21 DIAGNOSIS — Z98.890 OTHER SPECIFIED POSTPROCEDURAL STATES: Chronic | ICD-10-CM

## 2022-12-21 DIAGNOSIS — Z00.00 ENCOUNTER FOR GENERAL ADULT MEDICAL EXAMINATION WITHOUT ABNORMAL FINDINGS: ICD-10-CM

## 2022-12-21 DIAGNOSIS — Z96.653 PRESENCE OF ARTIFICIAL KNEE JOINT, BILATERAL: Chronic | ICD-10-CM

## 2022-12-21 DIAGNOSIS — Z85.828 PERSONAL HISTORY OF OTHER MALIGNANT NEOPLASM OF SKIN: Chronic | ICD-10-CM

## 2022-12-21 PROCEDURE — G0279: CPT | Mod: 26

## 2022-12-21 PROCEDURE — G0279: CPT

## 2022-12-21 PROCEDURE — 77066 DX MAMMO INCL CAD BI: CPT | Mod: 26

## 2022-12-21 PROCEDURE — 76641 ULTRASOUND BREAST COMPLETE: CPT | Mod: 26,50

## 2022-12-21 PROCEDURE — 76641 ULTRASOUND BREAST COMPLETE: CPT

## 2022-12-21 PROCEDURE — 77066 DX MAMMO INCL CAD BI: CPT

## 2023-01-20 ENCOUNTER — OUTPATIENT (OUTPATIENT)
Dept: OUTPATIENT SERVICES | Facility: HOSPITAL | Age: 73
LOS: 1 days | Discharge: HOME | End: 2023-01-20

## 2023-01-20 DIAGNOSIS — Z98.890 OTHER SPECIFIED POSTPROCEDURAL STATES: Chronic | ICD-10-CM

## 2023-01-20 DIAGNOSIS — C50.919 MALIGNANT NEOPLASM OF UNSPECIFIED SITE OF UNSPECIFIED FEMALE BREAST: ICD-10-CM

## 2023-01-20 DIAGNOSIS — Z85.828 PERSONAL HISTORY OF OTHER MALIGNANT NEOPLASM OF SKIN: Chronic | ICD-10-CM

## 2023-01-20 DIAGNOSIS — Z90.89 ACQUIRED ABSENCE OF OTHER ORGANS: Chronic | ICD-10-CM

## 2023-01-20 DIAGNOSIS — Z96.653 PRESENCE OF ARTIFICIAL KNEE JOINT, BILATERAL: Chronic | ICD-10-CM

## 2023-01-20 PROBLEM — Z00.00 ENCOUNTER FOR PREVENTIVE HEALTH EXAMINATION: Noted: 2023-01-20

## 2023-01-23 ENCOUNTER — APPOINTMENT (OUTPATIENT)
Dept: ORTHOPEDIC SURGERY | Facility: CLINIC | Age: 73
End: 2023-01-23
Payer: COMMERCIAL

## 2023-01-23 VITALS
DIASTOLIC BLOOD PRESSURE: 72 MMHG | SYSTOLIC BLOOD PRESSURE: 136 MMHG | BODY MASS INDEX: 39.7 KG/M2 | WEIGHT: 250 LBS | HEIGHT: 66.5 IN | HEART RATE: 56 BPM

## 2023-01-23 DIAGNOSIS — M25.561 PAIN IN RIGHT KNEE: ICD-10-CM

## 2023-01-23 DIAGNOSIS — M25.562 PAIN IN RIGHT KNEE: ICD-10-CM

## 2023-01-23 DIAGNOSIS — M25.572 PAIN IN LEFT ANKLE AND JOINTS OF LEFT FOOT: ICD-10-CM

## 2023-01-23 DIAGNOSIS — S82.841A DISPLACED BIMALLEOLAR FRACTURE OF RIGHT LOWER LEG, INITIAL ENCOUNTER FOR CLOSED FRACTURE: ICD-10-CM

## 2023-01-23 DIAGNOSIS — S80.02XA CONTUSION OF LEFT KNEE, INITIAL ENCOUNTER: ICD-10-CM

## 2023-01-23 PROCEDURE — 99204 OFFICE O/P NEW MOD 45 MIN: CPT

## 2023-01-23 PROCEDURE — 73610 X-RAY EXAM OF ANKLE: CPT | Mod: 50

## 2023-01-23 NOTE — H&P PST ADULT - NSCAFFEINETYPE_GEN_ALL_CORE_SD
----- Message from Feng Magdaleno sent at 1/20/2023 12:29 PM CST -----  Contact: Lisa (daughter)  [phone]  Pt daughter Lisa requesting a call in regards to RX for c-pap device.    Please call and advise      diet/pop/soda

## 2023-01-25 ENCOUNTER — APPOINTMENT (OUTPATIENT)
Dept: ORTHOPEDIC SURGERY | Facility: CLINIC | Age: 73
End: 2023-01-25
Payer: COMMERCIAL

## 2023-01-25 ENCOUNTER — NON-APPOINTMENT (OUTPATIENT)
Age: 73
End: 2023-01-25

## 2023-01-25 DIAGNOSIS — S82.401A UNSPECIFIED FRACTURE OF SHAFT OF RIGHT FIBULA, INITIAL ENCOUNTER FOR CLOSED FRACTURE: ICD-10-CM

## 2023-01-25 PROCEDURE — 73610 X-RAY EXAM OF ANKLE: CPT | Mod: RT

## 2023-01-25 PROCEDURE — 73630 X-RAY EXAM OF FOOT: CPT | Mod: RT

## 2023-01-25 PROCEDURE — 99214 OFFICE O/P EST MOD 30 MIN: CPT

## 2023-01-25 NOTE — PHYSICAL EXAM
[de-identified] : General: Alert and oriented x3. In no acute distress. Pleasant in nature with a normal affect. No apparent respiratory distress.\par \par B Ankle Exam\par Skin: Clean, dry, intact. + Significant bruising in the left leg. + Distal bruising in the right leg. \par Inspection: No obvious malalignment, no swelling, no effusion; no lymphadenopathy\par Pulses: 2+ DP/PT pulses\par ROM: R Ankle limited due to fracture\par Tenderness: + medial malleolus pain. No tenderness over the lateral malleolus, no CFL/ATFL/PTFL pain. No deltoid ligament pain. No proximal fibular pain. No heel pain.\par Stability: Negative anterior/posterior drawer.\par Strength: 5/5 TA/GS/EHL\par Neuro: In tact to light touch throughout\par Additional tests: Negative Mortons test, Negative syndesmosis squeeze test. Negative Homans sign\par  [de-identified] : X-rays of both ankles including stress views right ankle done by Dr. Zhu on 1/23/2023 reviewed in the office today, 01/25/2023, revealed: Comminution to the distal fibula fracture.\par \par ------------------------------------------------------------------------------------------------------------------------------------------------------------------------------------\par \par 3V of the right ankle and right foot were ordered, obtained and reviewed by me today, 01/25/2023, revealed: Comminuted distal fibula fracture. Questionable syndesmotic injury. Pes planus.

## 2023-01-25 NOTE — HISTORY OF PRESENT ILLNESS
[FreeTextEntry1] : The patient is a 72 year old female presenting for an initial visit of bilateral ankle pain (R>L) since Friday, January 13, 2023. She states that she was walking out of the bathroom at her [place] condo and the toe of her sneaker got caught on the threshold piece of the doorway and she fell forward with outstretched arms. She was able to stand up and walk after the fall, and had no pain at rest. The pain gradually worsened over time. She also developed bruising in the L>R LE. Patient presented to an orthopedic UC in [place] and x-rays were done, revealing bilateral ankle fractures. She saw Dr. Zhu here in [place] who took repeat x-rays with stress views of the right ankle and recommended ORIF right ankle. She is here seeking a second opinion. Patient presents ambulating with a rollator, which she is using due to her ankles. She has been putting weight on both lower extremities since the fall. 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 No other complaints at this time.\par She has PmHx of prediabetes, Sjogren's syndrome, psoriatic arthritis and spinal stenosis. She takes Duloxetine. Previous history of total knee replacements. She takes baby aspirin q.o.d. but no other blood thinners.

## 2023-01-25 NOTE — ADDENDUM
[FreeTextEntry1] : I, Kenji Guadalupe, acted solely as a scribe for Dr. Ponce Alex on this date 01/25/2023  .\par  \par All medical record entries made by the Scribe were at my, Dr. Ponce Alex, direction and personally dictated by me on 01/25/2023 . I have reviewed the chart and agree that the record accurately reflects my personal performance of the history, physical exam, assessment and plan. I have also personally directed, reviewed, and agreed with the chart.

## 2023-01-25 NOTE — REASON FOR VISIT
[Initial Visit] : an initial visit for [Spouse] : spouse [FreeTextEntry2] : bilateral ankle fractures

## 2023-01-25 NOTE — DISCUSSION/SUMMARY
[de-identified] : Today I had a lengthy discussion with the patient and her spouse regarding their bilateral ankle pain. I have addressed all the patient's concerns surrounding the pathology of their condition. XR imaging was completed in office today and I reviewed results with the patient. I discussed operative and non-operative treatment modalities in great detail with the patient. I am recommending we proceed with conservative measures at this time. I recommend that the patient increases her vitamin D intake from 1000 IU per day to 4205-1354 IU per day. I advised the patient to utilize 325 mg of Aspirin as instructed for blood thinning purposes. She is to continue wearing the CAM boot for ambulation. Fracture care discussed with the patient in detail. Risks of blood clot prevention discussed. Risks of fracture healing versus non healing discussed. Weightbearing rules discussed with the patient. We discussed a through treatment care plan in detail with the patient. The patient understood and verbally agreed to the treatment plan. All of their questions were answered and they were satisfied with the visit. The patient should call the office if they have any questions or experience worsening symptoms. I would like to see the patient back in the office in 10-14 days to obtain new x-rays and reassess her condition.

## 2023-01-27 PROBLEM — S82.841A CLOSED BIMALLEOLAR FRACTURE OF RIGHT ANKLE, INITIAL ENCOUNTER: Status: ACTIVE | Noted: 2023-01-23

## 2023-01-31 ENCOUNTER — APPOINTMENT (OUTPATIENT)
Dept: ORTHOPEDIC SURGERY | Facility: HOSPITAL | Age: 73
End: 2023-01-31

## 2023-02-02 ENCOUNTER — APPOINTMENT (OUTPATIENT)
Dept: NEUROLOGY | Facility: CLINIC | Age: 73
End: 2023-02-02
Payer: COMMERCIAL

## 2023-02-02 VITALS
SYSTOLIC BLOOD PRESSURE: 128 MMHG | BODY MASS INDEX: 43.55 KG/M2 | HEART RATE: 86 BPM | DIASTOLIC BLOOD PRESSURE: 82 MMHG | WEIGHT: 271 LBS | HEIGHT: 66 IN

## 2023-02-02 PROCEDURE — 99214 OFFICE O/P EST MOD 30 MIN: CPT

## 2023-02-02 NOTE — ASSESSMENT
[FreeTextEntry1] : Impression: This 72-year-old female patient has a chronic migraine headache disorder and she has had vertigo.  Her condition is improved with Nurtec 75 mg ODT.  She also has a chronic sinus condition.  Her hemifacial spasm has not recurred.  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  Vertigo has not recurred.  Multifocal pain is diminished with the addition of duloxetine.  Neurological exam is unchanged.\par \par Recommendations: Continue Nurtec 75 mg ODT every other day.  Relpax 40 mg as needed as directed.  Propranolol 40 mg twice daily.  Duloxetine 30 mg once daily.  Office follow-up in 4 months or as needed.\par

## 2023-02-02 NOTE — HISTORY OF PRESENT ILLNESS
[FreeTextEntry1] : This patient is seen for an office visit.  She continues to do well with respect to her chronic headache disorder in the setting of migraine while taking Nurtec 75 mg ODT every other day and only occasionally Relpax 40 mg as needed and propranolol 40 mg twice daily.  She has had sinus procedures performed with a chronic sinus condition.  She is doing better with duloxetine 30 mg once daily in place of Lexapro regarding her chronic pain in the setting of connective tissue disorder.  She has had no recurrence of hemifacial spasm or vertigo.  She has no other specific visual complaints at this time.\par \par She did suffer a mechanical fall while exiting the bathroom fracturing both ankles on 1/13/2023.  She is wearing a boot on the right ankle and she may require surgery.  There was no other significant injury with this fall.

## 2023-02-02 NOTE — PHYSICAL EXAM
[FreeTextEntry1] : Head:  Normocephalic Neck: Supple nontender no carotid bruits. \par \par Mental Status:  Alert Oriented X3 Speech normal and no aphasia or dysarthria.\par \par Cranial Nerves: Mild anisocoria unchanged.  Mild right upper eyelid ptosis unchanged., Fundi normal Visual Fields full  EOMI no diplopia no ptosis no nystagmus, V through XII intact.  No evidence of hemifacial spasm recurrent.\par \par Motor: She has a large boot right lower extremity status post right ankle fracture.  Otherwise unremarkable nonfocal.\par \par DTRs: Hypoactive unchanged.  Plantars flexor.  No Clonus.\par \par Sensory:  Normal testing with pin light touch bilaterally.\par \par Gait: Ambulates with a walker in a boot right leg status post ankle fractures\par

## 2023-02-09 ENCOUNTER — APPOINTMENT (OUTPATIENT)
Dept: ORTHOPEDIC SURGERY | Facility: CLINIC | Age: 73
End: 2023-02-09
Payer: COMMERCIAL

## 2023-02-09 PROCEDURE — 73610 X-RAY EXAM OF ANKLE: CPT | Mod: 50

## 2023-02-09 PROCEDURE — 99213 OFFICE O/P EST LOW 20 MIN: CPT | Mod: 25

## 2023-02-09 PROCEDURE — 27808 TREATMENT OF ANKLE FRACTURE: CPT | Mod: RT

## 2023-02-09 NOTE — HISTORY OF PRESENT ILLNESS
[FreeTextEntry1] : 2/9/2023: The patient is a 73 yo female presenting for a follow-up visit of bilateral ankle pain. She is accompanied by her . She states that her symptoms have improved since her previous visit. She has noticed a decrease in pain and swelling. Her pain levels are controlled. She is currently on Aspirin 325 MG for DVT prophylaxis. She has been walking on her feet, with the CAM boot on her right ankle. She is ambulating with a walker. \par \par 1/25/2023: The patient is a 72 year old female presenting for an initial visit of bilateral ankle pain (R>L) since Friday, January 13, 2023. She states that she was walking out of the bathroom at her [place] condo and the toe of her sneaker got caught on the threshold piece of the doorway and she fell forward with outstretched arms. She was able to stand up and walk after the fall, and had no pain at rest. The pain gradually worsened over time. She also developed bruising in the L>R LE. Patient presented to an orthopedic UC in [place] and x-rays were done, revealing bilateral ankle fractures. She saw Dr. Zhu here in [place] who took repeat x-rays with stress views of the right ankle and recommended ORIF right ankle. She is here seeking a second opinion. Patient presents ambulating with a rollator, which she is using due to her ankles. She has been putting weight on both lower extremities since the fall. 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 No other complaints at this time.\par She has PmHx of prediabetes, Sjogren's syndrome, psoriatic arthritis and spinal stenosis. She takes Duloxetine. Previous history of total knee replacements. She takes baby aspirin q.o.d. but no other blood thinners.

## 2023-02-09 NOTE — H&P PST ADULT - PAIN SCALE PREFERRED, PROFILE
numerical 0-10 3 yr old male with new onset fever, mild upper respiratory symptoms but benign well hydrated on exam. fever defervesed with tyl/motrin , nontoxic appearing , likley viral in origin , RVP obtained , supportive care

## 2023-02-09 NOTE — PHYSICAL EXAM
[de-identified] : General: Alert and oriented x3. In no acute distress. Pleasant in nature with a normal affect. No apparent respiratory distress.\par \par B Ankle Exam\par Skin: Clean, dry, intact. + Significant bruising in the left leg, improved. + Distal bruising in the right leg, improved. \par Inspection: No obvious malalignment, no swelling, no effusion; no lymphadenopathy\par Pulses: 2+ DP/PT pulses\par ROM: R Ankle limited due to fracture\par Tenderness: + medial malleolus pain, improved. No tenderness over the lateral malleolus, no CFL/ATFL/PTFL pain. No deltoid ligament pain. No proximal fibular pain. No heel pain.\par Stability: Negative anterior/posterior drawer.\par Strength: 5/5 TA/GS/EHL\par Neuro: In tact to light touch throughout\par Additional tests: Negative Mortons test, Negative syndesmosis squeeze test. Negative Homans sign\par  [de-identified] : 4V of bilateral foot and ankles were ordered obtained and reviewed by me today, 02/09/2023 , revealed: appropriate stability and healing of mortis plafond on the right side\par

## 2023-02-09 NOTE — DISCUSSION/SUMMARY
[de-identified] : Today I had a lengthy discussion with the patient and her spouse regarding their bilateral ankle pain. I have addressed all the patient's concerns surrounding the pathology of their condition. XR imaging was completed in office today and I reviewed results with the patient. I would like to continue with conservative management. I recommend that the patient continues to take 1461-3444 IU of vitamin D per day. The patient should continue to utilize 325 mg of Aspirin for DVT prophylaxis. She is to continue to wear the CAM boot for ambulation. Fracture care discussed with the patient in detail. Risks of blood clot prevention discussed. Risks of fracture healing versus non healing discussed. Weightbearing rules discussed with the patient. We discussed a through treatment care plan in detail with the patient. In the interm,  I recommend that the patient utilize ice, NSAIDs, and heat PRN. The patient understood and verbally agreed to the treatment plan. All of their questions were answered and they were satisfied with the visit. The patient should call the office if they have any questions or experience worsening symptoms. \par \par I would like to see the patient back in the office in 2-3 weeks to reassess their condition.\par

## 2023-02-27 ENCOUNTER — APPOINTMENT (OUTPATIENT)
Dept: ORTHOPEDIC SURGERY | Facility: CLINIC | Age: 73
End: 2023-02-27
Payer: COMMERCIAL

## 2023-02-27 PROCEDURE — 73610 X-RAY EXAM OF ANKLE: CPT | Mod: RT

## 2023-02-27 PROCEDURE — 99024 POSTOP FOLLOW-UP VISIT: CPT

## 2023-02-27 NOTE — PHYSICAL EXAM
[de-identified] : General: Alert and oriented x3. In no acute distress. Pleasant in nature with a normal affect. No apparent respiratory distress.\par \par B Ankle Exam\par Skin: Clean, dry, intact. + Significant bruising in the left leg, improved. + Distal bruising in the right leg, improved. \par Inspection: No obvious malalignment, no swelling, no effusion; no lymphadenopathy\par Pulses: 2+ DP/PT pulses\par ROM: R Ankle limited due to fracture\par Tenderness: + medial malleolus pain, improved. No tenderness over the lateral malleolus, no CFL/ATFL/PTFL pain. No deltoid ligament pain. No proximal fibular pain. No heel pain.\par Stability: Negative anterior/posterior drawer.\par Strength: 5/5 TA/GS/EHL\par Neuro: In tact to light touch throughout\par Additional tests: Negative Mortons test, Negative syndesmosis squeeze test. Negative Homans sign\par  [de-identified] : 4V of bilateral  ankles were ordered obtained and reviewed by me today, 02/27/2023 , revealed: appropriate stability and healing of mortis plafond on the right side\par

## 2023-02-27 NOTE — HISTORY OF PRESENT ILLNESS
[FreeTextEntry1] : 2/27/2023: The patient is a 71 yo female presenting for a follow-up visit of bilateral ankle pain. She is accompanied by her .Patient states that she is doing well. Her pain scale has improved since her previous visit. The patient presents wearing a CAM boot on the right ankle and is ambulating with a walker. No other complaints. 				\par \par 2/9/2023: The patient is a 71 yo female presenting for a follow-up visit of bilateral ankle pain. She is accompanied by her . She states that her symptoms have improved since her previous visit. She has noticed a decrease in pain and swelling. Her pain levels are controlled. She is currently on Aspirin 325 MG for DVT prophylaxis. She has been walking on her feet, with the CAM boot on her right ankle. She is ambulating with a walker. \par \par 1/25/2023: The patient is a 72 year old female presenting for an initial visit of bilateral ankle pain (R>L) since Friday, January 13, 2023. She states that she was walking out of the bathroom at her [facility] and the toe of her sneaker got caught on the threshold piece of the doorway and she fell forward with outstretched arms. She was able to stand up and walk after the fall, and had no pain at rest. The pain gradually worsened over time. She also developed bruising in the L>R LE. Patient presented to an orthopedic UC in [place] and x-rays were done, revealing bilateral ankle fractures. She saw Dr. Zhu here in [place] who took repeat x-rays with stress views of the right ankle and recommended ORIF right ankle. She is here seeking a second opinion. Patient presents ambulating with a rollator, which she is using due to her ankles. She has been putting weight on both lower extremities since the fall. 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 No other complaints at this time.\par She has PmHx of prediabetes, Sjogren's syndrome, psoriatic arthritis and spinal stenosis. She takes Duloxetine. Previous history of total knee replacements. She takes baby aspirin q.o.d. but no other blood thinners.

## 2023-03-22 NOTE — ASU PATIENT PROFILE, ADULT - PROVIDER NOTIFICATION
Quality 431: Preventive Care And Screening: Unhealthy Alcohol Use - Screening: Patient not identified as an unhealthy alcohol user when screened for unhealthy alcohol use using a systematic screening method Quality 110: Preventive Care And Screening: Influenza Immunization: Influenza immunization was not ordered or administered, reason not given Detail Level: Simple Quality 130: Documentation Of Current Medications In The Medical Record: Current Medications Documented Quality 402: Tobacco Use And Help With Quitting Among Adolescents: Patient screened for tobacco and never smoked Declines

## 2023-04-03 ENCOUNTER — APPOINTMENT (OUTPATIENT)
Dept: ORTHOPEDIC SURGERY | Facility: CLINIC | Age: 73
End: 2023-04-03
Payer: COMMERCIAL

## 2023-04-03 PROCEDURE — 73610 X-RAY EXAM OF ANKLE: CPT | Mod: RT

## 2023-04-03 PROCEDURE — 99024 POSTOP FOLLOW-UP VISIT: CPT

## 2023-04-03 NOTE — DISCUSSION/SUMMARY
[de-identified] : Today I had a lengthy discussion with the patient regarding their bilateral ankle pain. I have addressed all the patient's concerns surrounding the pathology of their condition. XR imaging was obtained and discussed in detail in office today. Discussed with the patient conservative and surgical treatment options. I recommended that the patient to continue with her course of physical therapy and continue wearing her aso brace. I recommend that the patient utilize OTC vitamin D for recovery. I recommend that the patient utilize ice, NSAIDS PRN, and heat. They can also elevate their bilateral ankles above the level of the heart. \par \par I recommend the patient follow up 6-8 weeks to reassess their condition. \par \par The patient understood and verbally agreed to the treatment plan. All of their questions were answered and they were satisfied with the visit. The patient should call the office if they have any questions or experience worsening symptoms. \par \par I provided the patient and her  with recommendations surrounding the brace as well as a boot while she is traveling.  She has my contact information should she require any further.  This patient continues to be high risk for nonunion of her ankle fracture with worsening instability.  I did educate the possible need for surgery in the future.  They verbalized understanding and are in agreement with this treatment plan.

## 2023-04-03 NOTE — ADDENDUM
[FreeTextEntry1] : I, MERLIN WILSON, acted solely as a scribe for Dr. Ponce Alex on this date 04/03/2023  .\par  \par All medical record entries made by the Scribe were at my, Dr. Ponce Alex, direction and personally dictated by me on 04/03/2023 . I have reviewed the chart and agree that the record accurately reflects my personal performance of the history, physical exam, assessment and plan. I have also personally directed, reviewed, and agreed with the chart.

## 2023-04-03 NOTE — REASON FOR VISIT
[Follow-Up Visit] : a follow-up visit for [Spouse] : spouse [FreeTextEntry2] : bilateral ankle fractures

## 2023-04-03 NOTE — PHYSICAL EXAM
[de-identified] : General: Alert and oriented x3. In no acute distress. Pleasant in nature with a normal affect. No apparent respiratory distress.\par \par B Ankle Exam\par Skin: Clean, dry, intact. + Significant bruising in the left leg, improved. + Distal bruising in the right leg, improved. \par Inspection: No obvious malalignment, no swelling, no effusion; no lymphadenopathy\par Pulses: 2+ DP/PT pulses\par ROM: R Ankle limited due to fracture\par Tenderness: + medial malleolus pain, improved. No tenderness over the lateral malleolus, no CFL/ATFL/PTFL pain. No deltoid ligament pain. No proximal fibular pain. No heel pain.\par Stability: Negative anterior/posterior drawer.\par Strength: 5/5 TA/GS/EHL\par Neuro: In tact to light touch throughout\par Additional tests: Negative Mortons test, Negative syndesmosis squeeze test. Negative Homans sign\par  [de-identified] : 4V of bilateral  ankles were ordered obtained and reviewed by me today, 04/3/2023 , revealed: Continued evidence of healing to the distal fibula on the right side with no evidence of abnormality on the left side.  The right distal fibula does demonstrate callus formation with possible log splitting with slight change of placement and positioning.  There is also continued midfoot and hindfoot collapse.  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

## 2023-04-21 ENCOUNTER — OUTPATIENT (OUTPATIENT)
Dept: OUTPATIENT SERVICES | Facility: HOSPITAL | Age: 73
LOS: 1 days | Discharge: HOME | End: 2023-04-21

## 2023-04-21 DIAGNOSIS — Z98.890 OTHER SPECIFIED POSTPROCEDURAL STATES: Chronic | ICD-10-CM

## 2023-04-21 DIAGNOSIS — Z90.89 ACQUIRED ABSENCE OF OTHER ORGANS: Chronic | ICD-10-CM

## 2023-04-21 DIAGNOSIS — Z96.653 PRESENCE OF ARTIFICIAL KNEE JOINT, BILATERAL: Chronic | ICD-10-CM

## 2023-04-21 DIAGNOSIS — Z85.828 PERSONAL HISTORY OF OTHER MALIGNANT NEOPLASM OF SKIN: Chronic | ICD-10-CM

## 2023-04-21 DIAGNOSIS — C50.919 MALIGNANT NEOPLASM OF UNSPECIFIED SITE OF UNSPECIFIED FEMALE BREAST: ICD-10-CM

## 2023-04-24 ENCOUNTER — APPOINTMENT (OUTPATIENT)
Dept: HEMATOLOGY ONCOLOGY | Facility: CLINIC | Age: 73
End: 2023-04-24
Payer: COMMERCIAL

## 2023-04-24 ENCOUNTER — APPOINTMENT (OUTPATIENT)
Dept: HEMATOLOGY ONCOLOGY | Facility: CLINIC | Age: 73
End: 2023-04-24

## 2023-04-24 VITALS
RESPIRATION RATE: 16 BRPM | DIASTOLIC BLOOD PRESSURE: 83 MMHG | BODY MASS INDEX: 43.93 KG/M2 | SYSTOLIC BLOOD PRESSURE: 166 MMHG | HEIGHT: 65.98 IN | OXYGEN SATURATION: 96 % | TEMPERATURE: 97.1 F | WEIGHT: 273.37 LBS | HEART RATE: 60 BPM

## 2023-04-24 PROCEDURE — 99213 OFFICE O/P EST LOW 20 MIN: CPT

## 2023-04-24 NOTE — ASSESSMENT
[FreeTextEntry1] : She is a 73 y/o  F with Stage I right breast invasive ductal carcinoma that is ER/OK positive and [id number] negative s/p lumpectomy and SLN biopsy followed by RT and on anastrozole since 5/2019. She continues to tolerate anastrozole and will complete 5 years in 2024. We reviewed bone density this year. We reviewed calcium and Vitamin D supplementation along with exercise to maintain bone health. We reviewed supportive measures to decrease strain to the chest wall while using rolling walker. She will have interval MRI of the breast done in May. Next follow up in 6 months but earlier if any new symptoms. Blood work will be done at [facility] and will get copy.

## 2023-04-24 NOTE — HISTORY OF PRESENT ILLNESS
[Disease: _____________________] : Disease: [unfilled] [T: ___] : T[unfilled] [N: ___] : N[unfilled] [de-identified] : Age 68: right breast cancer\par Screen detected: screening mammogram on 10/12/18 and was found to have questionable architectural distortions of the right breast. She was called back for a diagnostic mammogram on 10/23/18. It showed [id number] hypoechoic mass at 10 o’clock axis. She underwent core needle biopsy on 10/26/18 which revealed focal atypical duct hyperplasia with microcalcifications adjacent to benign breast tissue with dense stroma. She underwent a lumpectomy of the right breast on 12/18/18: well-differentiated invasive ductal carcinoma, 6mm, ER 98% strong, [place] 80% moderate to strong, Her2 negative (IHC 0), also with intermediate grade 1mm associated DCIS with focal necrosis identified. Inked margins were uninvolved, however there was invasive cancer present <1 mm from the margin. Lymph nodes were not examined. No LVI present. T1bN0. Breast MRI 1/3/2019 revealed a 7-8 mm indeterminate enhancing mass in the medial breast adjacent to a postoperative hematoma/seroma. Subsequent targeted ultrasound demonstrated a 5 x 4 x 4 mm hypoechoic mass with angular margins at 1:00, areolar margin, adjacent to the postoperative collection. In accordance with this finding, sampling the lesion by fine-needle aspiration was performed, with subsequent ultrasound core needle biopsy. Under ultrasound guidance, in a lateral approach, an 18-gauge needle was introduced into the lesion, which decreased in size but did not completely resolve. Subsequently, a 12-gauge core needle was used to obtain multiple core specimens of the target at 1:00, areolar margin. Core biopsy: organizing hematoma, fat necrosis and negative for mammary epithelium. She completed adjuvant radiation and started anastrozole May 2019. She was followed by Dr Chan and had continuity of care with us on 12/29/2021. \par  [de-identified] : well-differentiated invasive ductal carcinoma, 6mm, ER 98% strong, CO 80% moderate to strong, Her2 negative (IHC 0) [de-identified] : anastrozole 5/2019 to present  [de-identified] : Since her last evaluation 5/2022, she has been seeing orthopedics for ankle pain since fractures. Denies any new breast pain or chest wall changes. No back pain, cough or HA. She is due for bone density this year. Has been noticing occasional chest wall discomfort with palpation; no new trauma or exertion. She has GERD and taking medications for GERD. Denies any new breast pain or chest wall changes. No back pain, cough or HA.\par

## 2023-04-24 NOTE — PHYSICAL EXAM
[Restricted in physically strenuous activity but ambulatory and able to carry out work of a light or sedentary nature] : Status 1- Restricted in physically strenuous activity but ambulatory and able to carry out work of a light or sedentary nature, e.g., light house work, office work [Obese] : obese [Normal] : affect appropriate [de-identified] : periareolar scar R breast; no abnl masses or axillary LN palpable; no abnl masses or nodules palpated over sternum [de-identified] : multiple skin nevus and skin excisions for skin cancer  [de-identified] : using rolling walker for walking

## 2023-04-24 NOTE — CONSULT LETTER
[Dear  ___] : Dear  [unfilled], [Courtesy Letter:] : I had the pleasure of seeing your patient, [unfilled], in my office today. [Please see my note below.] : Please see my note below. [Consult Closing:] : Thank you very much for allowing me to participate in the care of this patient.  If you have any questions, please do not hesitate to contact me. [Sincerely,] : Sincerely, [FreeTextEntry2] : Donna Rea MD\par [address]\[place] [address]\par \par  [FreeTextEntry3] : Wellington Camp MD\par Attending\par [facility]\par

## 2023-04-24 NOTE — REVIEW OF SYSTEMS
[Joint Pain] : joint pain [Joint Stiffness] : no joint stiffness [Muscle Pain] : no muscle pain [Muscle Weakness] : no muscle weakness [Negative] : Allergic/Immunologic [FreeTextEntry9] : pain over ankles; occasional tenderness over the chest wall on palpation

## 2023-05-01 ENCOUNTER — OUTPATIENT (OUTPATIENT)
Dept: OUTPATIENT SERVICES | Facility: HOSPITAL | Age: 73
LOS: 1 days | End: 2023-05-01
Payer: COMMERCIAL

## 2023-05-01 ENCOUNTER — APPOINTMENT (OUTPATIENT)
Dept: MRI IMAGING | Facility: IMAGING CENTER | Age: 73
End: 2023-05-01
Payer: COMMERCIAL

## 2023-05-01 DIAGNOSIS — Z98.890 OTHER SPECIFIED POSTPROCEDURAL STATES: Chronic | ICD-10-CM

## 2023-05-01 DIAGNOSIS — Z00.8 ENCOUNTER FOR OTHER GENERAL EXAMINATION: ICD-10-CM

## 2023-05-01 DIAGNOSIS — Z90.89 ACQUIRED ABSENCE OF OTHER ORGANS: Chronic | ICD-10-CM

## 2023-05-01 DIAGNOSIS — Z96.653 PRESENCE OF ARTIFICIAL KNEE JOINT, BILATERAL: Chronic | ICD-10-CM

## 2023-05-01 DIAGNOSIS — Z85.828 PERSONAL HISTORY OF OTHER MALIGNANT NEOPLASM OF SKIN: Chronic | ICD-10-CM

## 2023-05-01 PROCEDURE — A9585: CPT

## 2023-05-01 PROCEDURE — C8937: CPT

## 2023-05-01 PROCEDURE — C8908: CPT

## 2023-05-01 PROCEDURE — 77049 MRI BREAST C-+ W/CAD BI: CPT | Mod: 26

## 2023-05-11 ENCOUNTER — APPOINTMENT (OUTPATIENT)
Dept: CARDIOLOGY | Facility: CLINIC | Age: 73
End: 2023-05-11
Payer: COMMERCIAL

## 2023-05-11 ENCOUNTER — NON-APPOINTMENT (OUTPATIENT)
Age: 73
End: 2023-05-11

## 2023-05-11 VITALS — SYSTOLIC BLOOD PRESSURE: 125 MMHG | DIASTOLIC BLOOD PRESSURE: 74 MMHG

## 2023-05-11 VITALS
HEIGHT: 65 IN | BODY MASS INDEX: 44.82 KG/M2 | OXYGEN SATURATION: 98 % | WEIGHT: 269 LBS | HEART RATE: 70 BPM | SYSTOLIC BLOOD PRESSURE: 151 MMHG | DIASTOLIC BLOOD PRESSURE: 81 MMHG

## 2023-05-11 PROCEDURE — 93000 ELECTROCARDIOGRAM COMPLETE: CPT

## 2023-05-11 PROCEDURE — 99214 OFFICE O/P EST MOD 30 MIN: CPT | Mod: 25

## 2023-05-11 RX ORDER — FUROSEMIDE 20 MG/1
20 TABLET ORAL DAILY
Qty: 90 | Refills: 2 | Status: DISCONTINUED | COMMUNITY
Start: 2021-09-30 | End: 2023-05-11

## 2023-05-11 NOTE — HISTORY OF PRESENT ILLNESS
[FreeTextEntry1] : 73 yo woman presents for cardiology followup.\par \par -H/o Breast cancer in Dec 2018 with well-differentiated invasive ductal carcinoma, 6mm, ER 98% strong, [place] 80% moderate to strong, Her2 negative (IHC 0), also with intermediate grade 1mm associated DCIS with focal necrosis identified. Inked margins were uninvolved, however there was invasive cancer present <1 mm from the margin. T1bN0. She was treated with surgery, lumpectomy and lymph notes; XRT; no chemotherapy. She was treated with anastrazole. \par -Migraine h/a, obesity and depression (no longer on medication). \par -She takes doxycycline (previously plaquenil) for systemic arthritis.\par -No h/o HTN. Not on statin therapy; borderline DM, hypothyroid\par \par Echo 2020: normal LVEF, 62%, with mild diastolic dysfunction, mildly dilated left atrium, mild mitral regurgitation, normal right heart size and function. \par \par At her last visit with me in Nov 2022, the following issues were discussed:\par Diastolic heart failure (428.30) (I50.30)\par  · Numerous risk factors for HFpEF. LE edema persists; improved. Suggest continued\par     diuretic use. She should follow her daily weights, and shoot for a slow/steady\par     decline in edema/weight. She was instructed to limit her free water and salt intake.\par Hyperlipidemia (272.4) (E78.5)\par  · Continues with statin use given DM / increase CV risk.\par Hypothyroidism (244.9) (E03.9)\par  · She continues with T-4 replacement; labs to be checked by PCP.\par Obesity (BMI 30-39.9) (278.00) (E66.9)\par  · Has continued to lose weight. Encouraged continued weight loss.\par Prediabetes (790.29) (R73.03)\par  · Monitoring her blood sugar.\par Shortness of breath on exertion (786.05) (R06.02)\par  · No over cardiac issues. Echo c/w normal LVEF, some diastolic dysfunction. BUCKLEY likely\par     from deconditioning.\par \par Since her last visit, on Friday, Jan 13, she broke both ankles (mechanical fall), treated conservatively (no surgery). She is finally getting back to normal ambulation. She continues to use intermittent diuretics for LE edema, which works well. Lately, she has noted an increase in her SBP (140-150's mmHg). At [id number]'s mmHg. No change in diet. No chest pain, SOB. She is limited in exercise due to her ankle injuries.\par \par Recent labs:\par BUN 39, creat 0.96\par LFT's normal\par A1c 5.8%\par tchol 148, trig 58, HDL 65, LDL 71 mg/dL\par \par ECG today:\par Sinus rhythm \par First degree AV block\par Nonspecific IVCD\par Left axis deviation\par Late transition QRS\par ABNORMAL ECG\par \par \par \par \par

## 2023-05-11 NOTE — PHYSICAL EXAM
[Well Developed] : well developed [Well Nourished] : well nourished [No Acute Distress] : no acute distress [Obese] : obese [Normal Conjunctiva] : normal conjunctiva [Normal Venous Pressure] : normal venous pressure [No Carotid Bruit] : no carotid bruit [Normal S1, S2] : normal S1, S2 [No Murmur] : no murmur [No Rub] : no rub [No Gallop] : no gallop [Clear Lung Fields] : clear lung fields [Good Air Entry] : good air entry [No Respiratory Distress] : no respiratory distress  [Soft] : abdomen soft [Non Tender] : non-tender [No Masses/organomegaly] : no masses/organomegaly [Normal Bowel Sounds] : normal bowel sounds [Normal Gait] : normal gait [No Cyanosis] : no cyanosis [No Clubbing] : no clubbing [No Varicosities] : no varicosities [No Rash] : no rash [No Skin Lesions] : no skin lesions [Moves all extremities] : moves all extremities [No Focal Deficits] : no focal deficits [Normal Speech] : normal speech [Alert and Oriented] : alert and oriented [Normal memory] : normal memory [de-identified] : BP equal bilaterally [de-identified] : 1+ edema, with mild pitting (improved from prior)

## 2023-05-15 ENCOUNTER — APPOINTMENT (OUTPATIENT)
Dept: ORTHOPEDIC SURGERY | Facility: CLINIC | Age: 73
End: 2023-05-15
Payer: COMMERCIAL

## 2023-05-15 PROCEDURE — 99213 OFFICE O/P EST LOW 20 MIN: CPT

## 2023-05-15 PROCEDURE — 73610 X-RAY EXAM OF ANKLE: CPT | Mod: 50

## 2023-05-15 NOTE — ADDENDUM
[FreeTextEntry1] : I, MERLIN WILSON, acted solely as a scribe for Dr. Ponce Alex on this date 05/15/2023  .\par  \par All medical record entries made by the Scribe were at my, Dr. Ponce Alex, direction and personally dictated by me on 05/15/2023 . I have reviewed the chart and agree that the record accurately reflects my personal performance of the history, physical exam, assessment and plan. I have also personally directed, reviewed, and agreed with the chart.

## 2023-05-15 NOTE — DISCUSSION/SUMMARY
[de-identified] : Today I had a lengthy discussion with the patient regarding their bilateral ankle pain. I have addressed all the patient's concerns surrounding the pathology of their condition. XR imaging was obtained and discussed in detail in office today. Discussed with the patient conservative and surgical treatment options. I recommended that the patient to continue with her course of physical therapy. A discussion was had about utilizing custom orthotics. The patient was educated about custom orthotics in the office today. I provided the patient and her  with recommendations surrounding the brace, [place] v.s. ASO. This patient continues to be high risk for nonunion of her ankle fracture with worsening instability. I did educate the possible need for surgery in the future.  They verbalized understanding and are in agreement with this treatment plan. I recommend that the patient utilize OTC vitamin D for recovery. I recommend that the patient utilize ice, NSAIDS PRN, and heat. They can also elevate their bilateral ankles above the level of the heart. \par \par I recommend the patient follow up 6-8 weeks to reassess their condition. \par \par The patient understood and verbally agreed to the treatment plan. All of their questions were answered and they were satisfied with the visit. The patient should call the office if they have any questions or experience worsening symptoms. \par \par

## 2023-05-15 NOTE — HISTORY OF PRESENT ILLNESS
[FreeTextEntry1] : 5/15/23: The patient is here for follow-up in regards to her bilateral ankle fractures. She presents today with her . She has been compliant with physical therapy. She notes there was some improvement of her pain symptoms. She presents today wearing sneakers. \par \par 4/3/23: Patient is here for follow up regarding bilateral ankle pain. She is accompanied by her . Patient presents in [facility]. States she has been doing well in physical therapy. Her pain scale has continued to improve since prior visit. Patient admits that on 3/25/23 she twisted her ankle getting out of the car which contributed to some mild pain and swelling, but has been doing well since then. \par \par 2/27/2023: The patient is a 71 yo female presenting for a follow-up visit of bilateral ankle pain. She is accompanied by her .Patient states that she is doing well. Her pain scale has improved since her previous visit. The patient presents wearing a CAM boot on the right ankle and is ambulating with a walker. No other complaints. 				\par \par 2/9/2023: The patient is a 71 yo female presenting for a follow-up visit of bilateral ankle pain. She is accompanied by her . She states that her symptoms have improved since her previous visit. She has noticed a decrease in pain and swelling. Her pain levels are controlled. She is currently on Aspirin 325 MG for DVT prophylaxis. She has been walking on her feet, with the CAM boot on her right ankle. She is ambulating with a walker. \par \par 1/25/2023: The patient is a 72 year old female presenting for an initial visit of bilateral ankle pain (R>L) since Friday, January 13, 2023. She states that she was walking out of the bathroom at her [facility] and the toe of her sneaker got caught on the threshold piece of the doorway and she fell forward with outstretched arms. She was able to stand up and walk after the fall, and had no pain at rest. The pain gradually worsened over time. She also developed bruising in the L>R LE. Patient presented to an orthopedic UC in [place] and x-rays were done, revealing bilateral ankle fractures. She saw Dr. Zhu here in [place] who took repeat x-rays with stress views of the right ankle and recommended ORIF right ankle. She is here seeking a second opinion. Patient presents ambulating with a rollator, which she is using due to her ankles. She has been putting weight on both lower extremities since the fall. 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 No other complaints at this time.\par She has PmHx of prediabetes, Sjogren's syndrome, psoriatic arthritis and spinal stenosis. She takes Duloxetine. Previous history of total knee replacements. She takes baby aspirin q.o.d. but no other blood thinners.

## 2023-05-15 NOTE — PHYSICAL EXAM
[de-identified] : General: Alert and oriented x3. In no acute distress. Pleasant in nature with a normal affect. No apparent respiratory distress.\par \par B Ankle Exam\par Skin: Clean, dry, intact. + Significant bruising in the left leg, improved. + Distal bruising in the right leg, improved. \par Inspection: No obvious malalignment, no swelling, no effusion; no lymphadenopathy\par Pulses: 2+ DP/PT pulses\par ROM: R Ankle limited due to fracture\par Tenderness: + medial malleolus pain, improved. No tenderness over the lateral malleolus, no CFL/ATFL/PTFL pain. No deltoid ligament pain. No proximal fibular pain. No heel pain.\par Stability: Negative anterior/posterior drawer.\par Strength: 5/5 TA/GS/EHL\par Neuro: In tact to light touch throughout\par Additional tests: Negative Mortons test, Negative syndesmosis squeeze test. Negative Homans sign\par  [de-identified] : 5V of the bilateral ankles were ordered obtained and reviewed by me today, 05/15/2023 , revealed: Continued evidence of healing to the distal fibula on the right side with no evidence of abnormality on the left side.  The right distal fibula does demonstrate callus formation with possible log splitting with slight change of placement and positioning.  There is also continued midfoot and hindfoot collapse.  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

## 2023-05-26 ENCOUNTER — NON-APPOINTMENT (OUTPATIENT)
Age: 73
End: 2023-05-26

## 2023-05-27 ENCOUNTER — NON-APPOINTMENT (OUTPATIENT)
Age: 73
End: 2023-05-27

## 2023-06-05 ENCOUNTER — APPOINTMENT (OUTPATIENT)
Dept: NEUROLOGY | Facility: CLINIC | Age: 73
End: 2023-06-05
Payer: COMMERCIAL

## 2023-06-05 VITALS
SYSTOLIC BLOOD PRESSURE: 140 MMHG | DIASTOLIC BLOOD PRESSURE: 80 MMHG | WEIGHT: 269 LBS | HEART RATE: 64 BPM | BODY MASS INDEX: 44.82 KG/M2 | HEIGHT: 65 IN

## 2023-06-05 PROCEDURE — 99215 OFFICE O/P EST HI 40 MIN: CPT

## 2023-06-05 NOTE — ASSESSMENT
[FreeTextEntry1] : Impression: This 72-year-old female patient has chronic migraine headache disorder and she has had occasional vertigo.  Her condition remains improved with Nurtec 75 mg ODT.  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  She has depression for which has exacerbated recently.  She has chronic multifocal pain improved with the addition of duloxetine.  Her neurological exam is stable.  Patient understands that propranolol may increase depression but this will be maintained since taking chronically and has been beneficial for migraine and blood pressure control.\par \par Recommendations: Continue duloxetine 30 mg 2 tablets daily Nurtec 75 mg ODT every other day Relpax 40 mg as needed as directed and propranolol 40 mg twice daily.  Office follow-up in 3 months.  Consider other medication for depression such as the addition of Wellbutrin.\par

## 2023-06-05 NOTE — REASON FOR VISIT
[Follow-Up: _____] : a [unfilled] follow-up visit [FreeTextEntry1] : Chronic migraine and other medical problems

## 2023-06-05 NOTE — HISTORY OF PRESENT ILLNESS
[FreeTextEntry1] : This patient is seen for an office visit.  She has had an increase in depression beginning in January when she suffered bilateral ankle fractures and in April her oldest daughter also fractured an ankle requiring surgery while the patient was vacationing in [place].  She did increase her duloxetine at my direction from 30mg to 60 mg daily with some improvement.  She asked about going back to Lexapro which would mean she would have to discontinue duloxetine.  The duloxetine has helped her chronic pain due to her rheumatological symptomatology.  Headaches have moderated and she will have good and bad days.  Episodic vertigo only occurs rarely.  She takes Nurtec ODT 75 mg every other day and propranolol 40 mg twice daily Relpax 40 mg as directed as needed and duloxetine 30 mg increased to 60 mg for depression and chronic pain.  She has no further hemifacial spasm she is not having any visual complaints.

## 2023-06-05 NOTE — PHYSICAL EXAM
[FreeTextEntry1] : Head:  Normocephalic Neck: Supple nontender no carotid bruits.  \par \par Mental Status:  Alert Oriented X3 Speech normal and no aphasia or dysarthria.\par \par Cranial Nerves: Less prominent anisocoria.  Less prominent right upper eyelid ptosis.  Fundi normal Visual Fields full  EOMI no diplopia no ptosis no nystagmus, V through XII intact.  No evidence of facial involuntary movements\par \par Motor:  No drift, normal strength tone and coordination and no focal atrophy. No abnormal movements. No dysmetria.  Normal rapid alternating movements. \par \par DTRs: Hypoactive..  Plantars flexor.  No Clonus.\par \par Sensory: Unremarkable pin and touch bilaterally\par \par Gait: Today unremarkable.\par

## 2023-06-08 ENCOUNTER — RX RENEWAL (OUTPATIENT)
Age: 73
End: 2023-06-08

## 2023-06-22 ENCOUNTER — RESULT REVIEW (OUTPATIENT)
Age: 73
End: 2023-06-22

## 2023-06-26 ENCOUNTER — RX RENEWAL (OUTPATIENT)
Age: 73
End: 2023-06-26

## 2023-06-29 ENCOUNTER — APPOINTMENT (OUTPATIENT)
Dept: ORTHOPEDIC SURGERY | Facility: CLINIC | Age: 73
End: 2023-06-29
Payer: COMMERCIAL

## 2023-06-29 DIAGNOSIS — S82.65XA NONDISPLACED FRACTURE OF LATERAL MALLEOLUS OF LEFT FIBULA, INITIAL ENCOUNTER FOR CLOSED FRACTURE: ICD-10-CM

## 2023-06-29 DIAGNOSIS — S82.841A DISPLACED BIMALLEOLAR FRACTURE OF RIGHT LOWER LEG, INITIAL ENCOUNTER FOR CLOSED FRACTURE: ICD-10-CM

## 2023-06-29 PROCEDURE — 73610 X-RAY EXAM OF ANKLE: CPT | Mod: 50

## 2023-06-29 PROCEDURE — 99213 OFFICE O/P EST LOW 20 MIN: CPT

## 2023-06-29 NOTE — DISCUSSION/SUMMARY
[de-identified] : Today I had a lengthy discussion with the patient regarding their bilateral ankle pain. I have addressed all the patient's concerns surrounding the pathology of their condition. XR imaging was obtained and discussed in detail in office today. Discussed with the patient conservative and surgical treatment options. They verbalized understanding and are in agreement with this treatment plan. I recommend that the patient utilize ice, NSAIDS PRN, and heat. They can also elevate their bilateral ankles above the level of the heart. I recommend the patient utilize a brace for support. Bracing options were discussed with our orthotist. \par \par I recommend the patient follow up 6-8 weeks to reassess their condition. \par \par The patient understood and verbally agreed to the treatment plan. All of their questions were answered and they were satisfied with the visit. The patient should call the office if they have any questions or experience worsening symptoms. \par \par

## 2023-06-29 NOTE — HISTORY OF PRESENT ILLNESS
[FreeTextEntry1] : 06/29/2023: The patient is a 72 year old female presenting to the office for a follow up evaluation of bilateral ankle fractures. She reports that she feels mildly improved symptoms. She is seeing podiatrist Dr. Mar. She presents to the office with KT tape on her right foot, in sneakers and ambulating without assistance. \par \par 5/15/23: The patient is here for follow-up in regards to her bilateral ankle fractures. She presents today with her . She has been compliant with physical therapy. She notes there was some improvement of her pain symptoms. She presents today wearing sneakers. \par \par 4/3/23: Patient is here for follow up regarding bilateral ankle pain. She is accompanied by her . Patient presents in [facility] brace. States she has been doing well in physical therapy. Her pain scale has continued to improve since prior visit. Patient admits that on 3/25/23 she twisted her ankle getting out of the car which contributed to some mild pain and swelling, but has been doing well since then. \par \par 2/27/2023: The patient is a 73 yo female presenting for a follow-up visit of bilateral ankle pain. She is accompanied by her .Patient states that she is doing well. Her pain scale has improved since her previous visit. The patient presents wearing a CAM boot on the right ankle and is ambulating with a walker. No other complaints. 				\par \par 2/9/2023: The patient is a 73 yo female presenting for a follow-up visit of bilateral ankle pain. She is accompanied by her . She states that her symptoms have improved since her previous visit. She has noticed a decrease in pain and swelling. Her pain levels are controlled. She is currently on Aspirin 325 MG for DVT prophylaxis. She has been walking on her feet, with the CAM boot on her right ankle. She is ambulating with a walker. \par \par 1/25/2023: The patient is a 72 year old female presenting for an initial visit of bilateral ankle pain (R>L) since Friday, January 13, 2023. She states that she was walking out of the bathroom at her [facility] and the toe of her sneaker got caught on the threshold piece of the doorway and she fell forward with outstretched arms. She was able to stand up and walk after the fall, and had no pain at rest. The pain gradually worsened over time. She also developed bruising in the L>R LE. Patient presented to an orthopedic UC in [place] and x-rays were done, revealing bilateral ankle fractures. She saw Dr. Zhu here in [place] who took repeat x-rays with stress views of the right ankle and recommended ORIF right ankle. She is here seeking a second opinion. Patient presents ambulating with a rollator, which she is using due to her ankles. She has been putting weight on both lower extremities since the fall. 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 No other complaints at this time.\par She has PmHx of prediabetes, Sjogren's syndrome, psoriatic arthritis and spinal stenosis. She takes Duloxetine. Previous history of total knee replacements. She takes baby aspirin q.o.d. but no other blood thinners.

## 2023-06-29 NOTE — PHYSICAL EXAM
[de-identified] : General: Alert and oriented x3. In no acute distress. Pleasant in nature with a normal affect. No apparent respiratory distress.\par \par B Ankle Exam\par Skin: Clean, dry, intact. + Significant bruising in the left leg, improved. + Distal bruising in the right leg, improved. \par Inspection: No obvious malalignment, no swelling, no effusion; no lymphadenopathy\par Pulses: 2+ DP/PT pulses\par ROM: R Ankle limited due to fracture\par Tenderness: + medial malleolus pain, improved. No tenderness over the lateral malleolus, no CFL/ATFL/PTFL pain. No deltoid ligament pain. No proximal fibular pain. No heel pain.\par Stability: Negative anterior/posterior drawer.\par Strength: 5/5 TA/GS/EHL\par Neuro: In tact to light touch throughout\par Additional tests: Negative Mortons test, Negative syndesmosis squeeze test. Negative Homans sign\par  [de-identified] : 5V of the bilateral ankles were ordered, obtained and reviewed by me today, 06/29/2023 , revealed: Healed fracture of the distal fibula on the right side with no evidence of abnormality on the left side. The right distal fibula does demonstrate callus formation with possible log splitting with slight change of placement and positioning. There is also continued midfoot and hindfoot collapse. 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

## 2023-06-29 NOTE — ADDENDUM
[FreeTextEntry1] : I, DONNA PERKINS, acted solely as a scribe for Dr. Ponce Alex on this date 06/29/2023  .\par  \par All medical record entries made by the Scribe were at my, Dr. Ponce Alex, direction and personally dictated by me on 06/29/2023 . I have reviewed the chart and agree that the record accurately reflects my personal performance of the history, physical exam, assessment and plan. I have also personally directed, reviewed, and agreed with the chart.

## 2023-07-28 ENCOUNTER — RX RENEWAL (OUTPATIENT)
Age: 73
End: 2023-07-28

## 2023-07-31 ENCOUNTER — APPOINTMENT (OUTPATIENT)
Dept: SURGERY | Facility: CLINIC | Age: 73
End: 2023-07-31
Payer: COMMERCIAL

## 2023-07-31 PROCEDURE — 99213K: CUSTOM

## 2023-08-02 ENCOUNTER — RX RENEWAL (OUTPATIENT)
Age: 73
End: 2023-08-02

## 2023-08-03 ENCOUNTER — RX RENEWAL (OUTPATIENT)
Age: 73
End: 2023-08-03

## 2023-08-04 ENCOUNTER — RX RENEWAL (OUTPATIENT)
Age: 73
End: 2023-08-04

## 2023-08-06 ENCOUNTER — RX RENEWAL (OUTPATIENT)
Age: 73
End: 2023-08-06

## 2023-08-06 DIAGNOSIS — G43.009 MIGRAINE W/OUT AURA, NOT INTRACTABLE, W/OUT STATUS MIGRAINOSUS: ICD-10-CM

## 2023-08-14 ENCOUNTER — APPOINTMENT (OUTPATIENT)
Dept: RADIOLOGY | Facility: HOSPITAL | Age: 73
End: 2023-08-14
Payer: COMMERCIAL

## 2023-08-14 ENCOUNTER — OUTPATIENT (OUTPATIENT)
Dept: OUTPATIENT SERVICES | Facility: HOSPITAL | Age: 73
LOS: 1 days | End: 2023-08-14
Payer: COMMERCIAL

## 2023-08-14 DIAGNOSIS — Z98.890 OTHER SPECIFIED POSTPROCEDURAL STATES: Chronic | ICD-10-CM

## 2023-08-14 DIAGNOSIS — Z85.828 PERSONAL HISTORY OF OTHER MALIGNANT NEOPLASM OF SKIN: Chronic | ICD-10-CM

## 2023-08-14 DIAGNOSIS — Z90.89 ACQUIRED ABSENCE OF OTHER ORGANS: Chronic | ICD-10-CM

## 2023-08-14 DIAGNOSIS — C50.411 MALIGNANT NEOPLASM OF UPPER-OUTER QUADRANT OF RIGHT FEMALE BREAST: ICD-10-CM

## 2023-08-14 DIAGNOSIS — Z96.653 PRESENCE OF ARTIFICIAL KNEE JOINT, BILATERAL: Chronic | ICD-10-CM

## 2023-08-14 PROCEDURE — 77080 DXA BONE DENSITY AXIAL: CPT

## 2023-08-14 PROCEDURE — 77080 DXA BONE DENSITY AXIAL: CPT | Mod: 26

## 2023-08-29 ENCOUNTER — LABORATORY RESULT (OUTPATIENT)
Age: 73
End: 2023-08-29

## 2023-08-29 ENCOUNTER — NON-APPOINTMENT (OUTPATIENT)
Age: 73
End: 2023-08-29

## 2023-08-29 ENCOUNTER — APPOINTMENT (OUTPATIENT)
Dept: INTERNAL MEDICINE | Facility: CLINIC | Age: 73
End: 2023-08-29
Payer: COMMERCIAL

## 2023-08-29 ENCOUNTER — APPOINTMENT (OUTPATIENT)
Dept: OBGYN | Facility: CLINIC | Age: 73
End: 2023-08-29
Payer: COMMERCIAL

## 2023-08-29 VITALS
DIASTOLIC BLOOD PRESSURE: 80 MMHG | WEIGHT: 259 LBS | HEIGHT: 66.5 IN | BODY MASS INDEX: 41.13 KG/M2 | SYSTOLIC BLOOD PRESSURE: 146 MMHG

## 2023-08-29 VITALS
SYSTOLIC BLOOD PRESSURE: 172 MMHG | BODY MASS INDEX: 41.13 KG/M2 | HEIGHT: 66.5 IN | WEIGHT: 259 LBS | DIASTOLIC BLOOD PRESSURE: 87 MMHG

## 2023-08-29 DIAGNOSIS — Z00.00 ENCOUNTER FOR GENERAL ADULT MEDICAL EXAMINATION W/OUT ABNORMAL FINDINGS: ICD-10-CM

## 2023-08-29 DIAGNOSIS — J45.20 MILD INTERMITTENT ASTHMA, UNCOMPLICATED: ICD-10-CM

## 2023-08-29 DIAGNOSIS — Z01.419 ENCOUNTER FOR GYNECOLOGICAL EXAMINATION (GENERAL) (ROUTINE) W/OUT ABNORMAL FINDINGS: ICD-10-CM

## 2023-08-29 PROCEDURE — 36415 COLL VENOUS BLD VENIPUNCTURE: CPT

## 2023-08-29 PROCEDURE — 99397 PER PM REEVAL EST PAT 65+ YR: CPT

## 2023-08-29 PROCEDURE — G0438: CPT

## 2023-08-29 PROCEDURE — 93000 ELECTROCARDIOGRAM COMPLETE: CPT

## 2023-08-29 NOTE — REVIEW OF SYSTEMS
[Dyspnea on Exertion] : dyspnea on exertion [Back Pain] : back pain [Headache] : headache [Negative] : Heme/Lymph [FreeTextEntry5] : Dyspnea on exertion

## 2023-08-29 NOTE — ASSESSMENT
[FreeTextEntry1] : This is a 72-year-old female for initial annual assessment.  Patient is followed by cardiology neurology oncology.  Patient had a malignancy of the right upper quadrant of her right breast.  She was estrogen receptor positive and progesterone positive and HER2 nu negative.  She received a lumpectomy radiation and placed on anastrozole. She is also being treated by cardiology for HFpEF and dyslipidemia.  She also has a history of headaches being followed by neurology.  She also has difficulty with weight and has had a history of depression in the past,.  In addition she has a history of hypothyroidism In addition to the above patient does have radiculopathy which is well controlled on duloxetine.  She also has asthma which is not currently active.  She does have dyspnea on exertion Patient is losing weight slowly.  But she has lost 60 pounds.  She does have an endocrinologist who appropriately has suggested a GLP 1 agonist.  However she is not using this primarily because of expense  She has been started on metformin for weight loss and prediabetes I did repeat a hemoglobin A1c  She continues to get headaches at least 8 times a month she will resee neurology she is on calcitonin gene related peptide Nurtec triptan without complete relief she will follow-up with neurology as stated above  She has a history of asthma she remains on Symbicort.  She states that she has had no breakthroughs but if she stops taking Symbicort she wheezes we will continue this  She has a history of hypothyroidism she remains on Synthroid a TSH has been obtained medications changes pending results  I she is being treated by cardiology for HFpEF she has minimal symptomatology echocardiogram was  done in 2020 which showed mild diastolic dysfunction EKG is unchanged from previous tracings   she is up-to-date with bone density mammography OB/GYN she states that she is up-to-date with  pneumonia shingles and colonoscopy as well as COVID   she also states that she has Sjogren's on no therapy she did have bilateral ankle fractures although  she does not have osteoporosis on her bone density this may be the equivalent I asked her to speak to her rheumatologist about whether or not she should be getting therapy  I did ask her to obtain the new COVID shot influenza shot and RSV  She is aware of the importance of weight loss   I did try to encourage her to get and use a GLP-1  receptor agonist   she did have a history of depression in the past but states she is not depressed now occasionally she will feel down but nothing more   She does snore and states that she is fatigued I did suggest a sleep study particularly in the face of diastolic dysfunction

## 2023-08-29 NOTE — HEALTH RISK ASSESSMENT
[Fair] :  ~his/her~ mood as fair [No] : No [No falls in past year] : Patient reported no falls in the past year [0] : 1) Little interest or pleasure doing things: Not at all (0) [1] : 2) Feeling down, depressed, or hopeless for several days (1) [PHQ-2 Negative - No further assessment needed] : PHQ-2 Negative - No further assessment needed [[id number]] : 1 [Hepatitis C test offered] : Hepatitis C test offered [Change in mental status noted] : No change in mental status noted [None] : None [With Significant Other] : lives with significant other [Retired] : retired [College] : College [] :  [Sexually Active] : not sexually active [Feels Safe at Home] : Feels safe at home [Neglect Or Abandonment] : neglect or abandonment [Fully functional (bathing, dressing, toileting, transferring, walking, feeding)] : Fully functional (bathing, dressing, toileting, transferring, walking, feeding) [Fully functional (using the telephone, shopping, preparing meals, housekeeping, doing laundry, using] : Fully functional and needs no help or supervision to perform IADLs (using the telephone, shopping, preparing meals, housekeeping, doing laundry, using transportation, managing medications and managing finances) [Reports changes in hearing] : Reports no changes in hearing [Reports changes in vision] : Reports no changes in vision [Reports changes in dental health] : Reports no changes in dental health [Smoke Detector] : smoke detector [Carbon Monoxide Detector] : carbon monoxide detector [Guns at Home] : no guns at home [Safety elements used in home] : no safety elements used in home [Seat Belt] :  uses seat belt [Sunscreen] : uses sunscreen [Travel to Developing Areas] : does not  travel to developing areas [TB Exposure] : is not being exposed to tuberculosis [Caregiver Concerns] : does not have caregiver concerns [I will adhere to the patient's wishes.] : I will adhere to the patient's wishes.

## 2023-08-29 NOTE — HISTORY OF PRESENT ILLNESS
[FreeTextEntry1] : This is a 72-year-old female for initial annual assessment.  Patient is followed by cardiology neurology oncology.  Patient had a malignancy of the right upper quadrant of her right breast.  She was estrogen receptor positive and progesterone positive and HER2 nu negative.  She received a lumpectomy radiation and placed on anastrozole. She is also being treated by cardiology for HFpEF and dyslipidemia.  She also has a history of headaches being followed by neurology.  She also has difficulty with weight and has had a history of depression in the past,.  In addition she has a history of hypothyroidism  [de-identified] : In addition to the above patient does have radiculopathy which is well controlled on duloxetine.  She also has asthma which is not currently active.  She does have dyspnea on exertion

## 2023-08-29 NOTE — PROCEDURE
97.3 [Cervical Pap Smear] : cervical Pap smear [Liquid Base] : liquid base [Tolerated Well] : the patient tolerated the procedure well [No Complications] : there were no complications

## 2023-08-29 NOTE — PHYSICAL EXAM
[No Acute Distress] : no acute distress [Well Developed] : well developed [Well-Appearing] : well-appearing [Normal Sclera/Conjunctiva] : normal sclera/conjunctiva [PERRL] : pupils equal round and reactive to light [EOMI] : extraocular movements intact [Normal Outer Ear/Nose] : the outer ears and nose were normal in appearance [Normal Oropharynx] : the oropharynx was normal [No JVD] : no jugular venous distention [No Lymphadenopathy] : no lymphadenopathy [Supple] : supple [Thyroid Normal, No Nodules] : the thyroid was normal and there were no nodules present [No Respiratory Distress] : no respiratory distress  [No Accessory Muscle Use] : no accessory muscle use [Clear to Auscultation] : lungs were clear to auscultation bilaterally [Normal Rate] : normal rate  [Regular Rhythm] : with a regular rhythm [Normal S1, S2] : normal S1 and S2 [No Murmur] : no murmur heard [No Carotid Bruits] : no carotid bruits [No Abdominal Bruit] : a ~M bruit was not heard ~T in the abdomen [No Varicosities] : no varicosities [Pedal Pulses Present] : the pedal pulses are present [No Edema] : there was no peripheral edema [No Palpable Aorta] : no palpable aorta [No Extremity Clubbing/Cyanosis] : no extremity clubbing/cyanosis [No Nipple Discharge] : no nipple discharge [No Axillary Lymphadenopathy] : no axillary lymphadenopathy [Soft] : abdomen soft [Non Tender] : non-tender [Non-distended] : non-distended [No Masses] : no abdominal mass palpated [No HSM] : no HSM [Normal Bowel Sounds] : normal bowel sounds [Normal Posterior Cervical Nodes] : no posterior cervical lymphadenopathy [Normal Anterior Cervical Nodes] : no anterior cervical lymphadenopathy [No CVA Tenderness] : no CVA  tenderness [No Spinal Tenderness] : no spinal tenderness [No Joint Swelling] : no joint swelling [Grossly Normal Strength/Tone] : grossly normal strength/tone [No Rash] : no rash [Coordination Grossly Intact] : coordination grossly intact [No Focal Deficits] : no focal deficits [Normal Gait] : normal gait [Deep Tendon Reflexes (DTR)] : deep tendon reflexes were 2+ and symmetric [Normal Affect] : the affect was normal [Normal Insight/Judgement] : insight and judgment were intact [de-identified] : Obese [de-identified] : Status postlumpectomy

## 2023-08-30 LAB
25(OH)D3 SERPL-MCNC: 50.7 NG/ML
ALBUMIN SERPL ELPH-MCNC: 4.4 G/DL
ALP BLD-CCNC: 53 U/L
ALT SERPL-CCNC: 13 U/L
ANION GAP SERPL CALC-SCNC: 13 MMOL/L
AST SERPL-CCNC: 19 U/L
BASOPHILS # BLD AUTO: 0.06 K/UL
BASOPHILS # BLD AUTO: 0.07 K/UL
BASOPHILS NFR BLD AUTO: 0.9 %
BASOPHILS NFR BLD AUTO: 1.1 %
BILIRUB SERPL-MCNC: 0.5 MG/DL
BUN SERPL-MCNC: 31 MG/DL
CALCIUM SERPL-MCNC: 10.5 MG/DL
CHLORIDE SERPL-SCNC: 103 MMOL/L
CHOLEST SERPL-MCNC: 142 MG/DL
CO2 SERPL-SCNC: 26 MMOL/L
CREAT SERPL-MCNC: 0.98 MG/DL
EGFR: 62 ML/MIN/1.73M2
EOSINOPHIL # BLD AUTO: 0.6 K/UL
EOSINOPHIL # BLD AUTO: 0.62 K/UL
EOSINOPHIL NFR BLD AUTO: 9 %
EOSINOPHIL NFR BLD AUTO: 9.1 %
ESTIMATED AVERAGE GLUCOSE: 114 MG/DL
GLUCOSE SERPL-MCNC: 110 MG/DL
HBA1C MFR BLD HPLC: 5.6 %
HCT VFR BLD CALC: 39.4 %
HCT VFR BLD CALC: 40 %
HDLC SERPL-MCNC: 66 MG/DL
HGB BLD-MCNC: 12.7 G/DL
HGB BLD-MCNC: 12.9 G/DL
IMM GRANULOCYTES NFR BLD AUTO: 0.2 %
IMM GRANULOCYTES NFR BLD AUTO: 0.3 %
LDLC SERPL CALC-MCNC: 63 MG/DL
LYMPHOCYTES # BLD AUTO: 1.28 K/UL
LYMPHOCYTES # BLD AUTO: 1.39 K/UL
LYMPHOCYTES NFR BLD AUTO: 19.5 %
LYMPHOCYTES NFR BLD AUTO: 20.2 %
MAN DIFF?: NORMAL
MAN DIFF?: NORMAL
MCHC RBC-ENTMCNC: 30 PG
MCHC RBC-ENTMCNC: 30.2 PG
MCHC RBC-ENTMCNC: 32.2 GM/DL
MCHC RBC-ENTMCNC: 32.3 GM/DL
MCV RBC AUTO: 93 FL
MCV RBC AUTO: 93.8 FL
MONOCYTES # BLD AUTO: 0.59 K/UL
MONOCYTES # BLD AUTO: 0.61 K/UL
MONOCYTES NFR BLD AUTO: 8.9 %
MONOCYTES NFR BLD AUTO: 9 %
NEUTROPHILS # BLD AUTO: 4.02 K/UL
NEUTROPHILS # BLD AUTO: 4.17 K/UL
NEUTROPHILS NFR BLD AUTO: 60.7 %
NEUTROPHILS NFR BLD AUTO: 61.1 %
NONHDLC SERPL-MCNC: 76 MG/DL
NT-PROBNP SERPL-MCNC: 453 PG/ML
PLATELET # BLD AUTO: 272 K/UL
PLATELET # BLD AUTO: 279 K/UL
POTASSIUM SERPL-SCNC: 4.8 MMOL/L
PROT SERPL-MCNC: 6.6 G/DL
RBC # BLD: 4.2 M/UL
RBC # BLD: 4.3 M/UL
RBC # FLD: 13.4 %
RBC # FLD: 13.6 %
SODIUM SERPL-SCNC: 141 MMOL/L
T3RU NFR SERPL: 0.9 TBI
T4 SERPL-MCNC: 11.7 UG/DL
TRIGL SERPL-MCNC: 66 MG/DL
TSH SERPL-ACNC: 0.21 UIU/ML
URATE SERPL-MCNC: 6.4 MG/DL
VIT B12 SERPL-MCNC: 701 PG/ML
WBC # FLD AUTO: 6.57 K/UL
WBC # FLD AUTO: 6.87 K/UL

## 2023-09-01 LAB
CYTOLOGY CVX/VAG DOC THIN PREP: ABNORMAL
HCV AB SER QL: NONREACTIVE
HCV S/CO RATIO: 0.14 S/CO
HPV HIGH+LOW RISK DNA PNL CVX: NOT DETECTED

## 2023-09-10 NOTE — SIGNATURES
[TextEntry] : This note was written by Jose D Sahu on 08/29/2023 actively solely JOSHUA Diggs M.D.  All medical record entries made by the Scribe were at my, JOSHUA Diggs M.D. direction and personally dictated by me on 08/29/2023. I have personally reviewed the chart and agree that the record reflects my personal performance of the history, physical exam, assessment, and plan.

## 2023-09-10 NOTE — HISTORY OF PRESENT ILLNESS
[Patient reported bone density results were normal] : Patient reported bone density results were normal [Patient reported mammogram was normal] : Patient reported mammogram was normal [Patient reported PAP Smear was normal] : Patient reported PAP Smear was normal [Patient reported colonoscopy was normal] : Patient reported colonoscopy was normal [FreeTextEntry1] : NOLAN NEGRETE 72 year old, G0, LMP: 1994, presents to establish gyn care. No vaginal bleeding, discharge or vaginitis symptoms. She denies abdominal and pelvic pain. No urinary complaints. BM is normal per patient. Pt sees onc twice a year and breast surgeon yearly.   Obhx: G0 GYNhx: endometrial polyp, hysteroscopy polyp removal 2020. Pt reports her last TVUS was sometime last year and was normal. PMH: osteopenia, breast ca s/p radiation therapy and Anastrozole (neg genetics), hypothyroid, asthma, acid reflux, prediabetes, migraines, Sjrogens PSH: knee replacement x2 2006, sinus surgery x4 [id number], breast surgery x2 2019 and 2020 Med: Anastrozole, Propranolol, Synthroid 137, Meloxicam, Duloxetine, Atorvastatin, Metformin 500mg, Nurtec, Furosemide, Symbicort, Pepcid, baby ASA All: NKDA Soc: denies Psych: denies Famhx: mother and father- melanoma, maternal and paternal aunt- breast ca  Health maintenance: Immunizations (flu, COVID,Pneumovax, Shingrix): UTD [Mammogramdate] : 10/22 [TextBox_19] : pt gets rx per breast surgeon [PapSmeardate] : 2020 [BoneDensityDate] : 08/23 [TextBox_31] : normal per pt [TextBox_37] : osteopenia, due 08/25 [ColonoscopyDate] : 2020 [TextBox_43] : recommended repeat in 2030

## 2023-09-10 NOTE — PHYSICAL EXAM
[Chaperone Present] : A chaperone was present in the examining room during all aspects of the physical examination [Appropriately responsive] : appropriately responsive [Alert] : alert [No Acute Distress] : no acute distress [No Lymphadenopathy] : no lymphadenopathy [Regular Rate Rhythm] : regular rate rhythm [No Murmurs] : no murmurs [Clear to Auscultation B/L] : clear to auscultation bilaterally [Soft] : soft [Non-tender] : non-tender [Non-distended] : non-distended [No HSM] : No HSM [No Lesions] : no lesions [No Mass] : no mass [Oriented x3] : oriented x3 [Examination Of The Breasts] : a normal appearance [No Masses] : no breast masses were palpable [Vulvar Atrophy] : vulvar atrophy [Labia Majora] : normal [Labia Minora] : normal [Atrophy] : atrophy [Normal] : normal [Uterine Adnexae] : normal [FreeTextEntry1] : estela [FreeTextEntry9] : no masses, guaiac negative

## 2023-09-10 NOTE — PLAN
[FreeTextEntry1] : #HCM -Breast self exam reviewed and taught -STI Screening declined -Pap/HPV today -DEXA bone density due 2025 -Colonoscopy due in 2030 -Immunizations: UTD  #Outside Records -Pt to send mammogram, TVUS, pap smears, hysteroscopy result notes, genetics report, and any GYN records from previous gyn  RTO in 1 year for annual GYN exam or PRN for any GYN complaints KIRAN Smalls MD

## 2023-09-12 ENCOUNTER — APPOINTMENT (OUTPATIENT)
Dept: NEUROLOGY | Facility: CLINIC | Age: 73
End: 2023-09-12
Payer: COMMERCIAL

## 2023-09-12 VITALS
HEIGHT: 66.5 IN | TEMPERATURE: 98 F | HEART RATE: 54 BPM | SYSTOLIC BLOOD PRESSURE: 108 MMHG | BODY MASS INDEX: 41.13 KG/M2 | WEIGHT: 259 LBS | DIASTOLIC BLOOD PRESSURE: 74 MMHG | OXYGEN SATURATION: 95 %

## 2023-09-12 DIAGNOSIS — G43.109 MIGRAINE WITH AURA, NOT INTRACTABLE, W/OUT STATUS MIGRAINOSUS: ICD-10-CM

## 2023-09-12 PROCEDURE — 99215 OFFICE O/P EST HI 40 MIN: CPT

## 2023-09-12 RX ORDER — TOPIRAMATE 50 MG/1
50 TABLET, FILM COATED ORAL
Refills: 0 | Status: COMPLETED | COMMUNITY
End: 2023-09-12

## 2023-09-12 RX ORDER — TOPIRAMATE 25 MG/1
25 TABLET, FILM COATED ORAL
Qty: 30 | Refills: 5 | Status: COMPLETED | COMMUNITY
Start: 2021-05-28 | End: 2023-09-12

## 2023-09-12 RX ORDER — METFORMIN HYDROCHLORIDE 1000 MG/1
1000 TABLET, COATED ORAL
Refills: 0 | Status: ACTIVE | COMMUNITY

## 2023-10-26 ENCOUNTER — OUTPATIENT (OUTPATIENT)
Dept: OUTPATIENT SERVICES | Facility: HOSPITAL | Age: 73
LOS: 1 days | Discharge: HOME | End: 2023-10-26

## 2023-10-26 DIAGNOSIS — Z98.890 OTHER SPECIFIED POSTPROCEDURAL STATES: Chronic | ICD-10-CM

## 2023-10-26 DIAGNOSIS — Z96.653 PRESENCE OF ARTIFICIAL KNEE JOINT, BILATERAL: Chronic | ICD-10-CM

## 2023-10-26 DIAGNOSIS — C50.919 MALIGNANT NEOPLASM OF UNSPECIFIED SITE OF UNSPECIFIED FEMALE BREAST: ICD-10-CM

## 2023-10-26 DIAGNOSIS — Z85.828 PERSONAL HISTORY OF OTHER MALIGNANT NEOPLASM OF SKIN: Chronic | ICD-10-CM

## 2023-10-26 DIAGNOSIS — Z90.89 ACQUIRED ABSENCE OF OTHER ORGANS: Chronic | ICD-10-CM

## 2023-10-27 ENCOUNTER — APPOINTMENT (OUTPATIENT)
Dept: HEMATOLOGY ONCOLOGY | Facility: CLINIC | Age: 73
End: 2023-10-27
Payer: COMMERCIAL

## 2023-10-27 VITALS
HEART RATE: 54 BPM | DIASTOLIC BLOOD PRESSURE: 80 MMHG | OXYGEN SATURATION: 96 % | WEIGHT: 259.04 LBS | SYSTOLIC BLOOD PRESSURE: 147 MMHG | TEMPERATURE: 97.5 F | RESPIRATION RATE: 16 BRPM | BODY MASS INDEX: 41.18 KG/M2

## 2023-10-27 PROCEDURE — 99213 OFFICE O/P EST LOW 20 MIN: CPT

## 2023-10-30 ENCOUNTER — RX RENEWAL (OUTPATIENT)
Age: 73
End: 2023-10-30

## 2023-11-03 ENCOUNTER — NON-APPOINTMENT (OUTPATIENT)
Age: 73
End: 2023-11-03

## 2023-11-08 ENCOUNTER — RX RENEWAL (OUTPATIENT)
Age: 73
End: 2023-11-08

## 2023-11-26 ENCOUNTER — RX RENEWAL (OUTPATIENT)
Age: 73
End: 2023-11-26

## 2023-12-01 ENCOUNTER — APPOINTMENT (OUTPATIENT)
Dept: INTERNAL MEDICINE | Facility: CLINIC | Age: 73
End: 2023-12-01
Payer: COMMERCIAL

## 2023-12-01 VITALS — HEIGHT: 65.5 IN | BODY MASS INDEX: 41.65 KG/M2 | WEIGHT: 253 LBS

## 2023-12-01 DIAGNOSIS — J06.9 ACUTE UPPER RESPIRATORY INFECTION, UNSPECIFIED: ICD-10-CM

## 2023-12-01 LAB
FLUAV SPEC QL CULT: NEGATIVE
FLUBV AG SPEC QL IA: NEGATIVE
S PYO AG SPEC QL IA: NEGATIVE

## 2023-12-01 PROCEDURE — 87804 INFLUENZA ASSAY W/OPTIC: CPT | Mod: QW

## 2023-12-01 PROCEDURE — 87880 STREP A ASSAY W/OPTIC: CPT | Mod: QW

## 2023-12-01 PROCEDURE — 99214 OFFICE O/P EST MOD 30 MIN: CPT | Mod: 25

## 2023-12-02 LAB
RAPID RVP RESULT: DETECTED
RV+EV RNA SPEC QL NAA+PROBE: DETECTED
SARS-COV-2 RNA PNL RESP NAA+PROBE: NOT DETECTED

## 2023-12-07 ENCOUNTER — APPOINTMENT (OUTPATIENT)
Dept: ORTHOPEDIC SURGERY | Facility: CLINIC | Age: 73
End: 2023-12-07
Payer: COMMERCIAL

## 2023-12-07 DIAGNOSIS — M19.071 PRIMARY OSTEOARTHRITIS, RIGHT ANKLE AND FOOT: ICD-10-CM

## 2023-12-07 DIAGNOSIS — M21.42 FLAT FOOT [PES PLANUS] (ACQUIRED), RIGHT FOOT: ICD-10-CM

## 2023-12-07 DIAGNOSIS — M76.821 POSTERIOR TIBIAL TENDINITIS, RIGHT LEG: ICD-10-CM

## 2023-12-07 DIAGNOSIS — M21.41 FLAT FOOT [PES PLANUS] (ACQUIRED), RIGHT FOOT: ICD-10-CM

## 2023-12-07 DIAGNOSIS — M25.571 PAIN IN RIGHT ANKLE AND JOINTS OF RIGHT FOOT: ICD-10-CM

## 2023-12-07 DIAGNOSIS — M76.822 POSTERIOR TIBIAL TENDINITIS, RIGHT LEG: ICD-10-CM

## 2023-12-07 PROCEDURE — 99213 OFFICE O/P EST LOW 20 MIN: CPT

## 2023-12-22 ENCOUNTER — APPOINTMENT (OUTPATIENT)
Dept: MAMMOGRAPHY | Facility: CLINIC | Age: 73
End: 2023-12-22
Payer: COMMERCIAL

## 2023-12-22 ENCOUNTER — APPOINTMENT (OUTPATIENT)
Dept: ULTRASOUND IMAGING | Facility: CLINIC | Age: 73
End: 2023-12-22
Payer: COMMERCIAL

## 2023-12-22 ENCOUNTER — OUTPATIENT (OUTPATIENT)
Dept: OUTPATIENT SERVICES | Facility: HOSPITAL | Age: 73
LOS: 1 days | End: 2023-12-22
Payer: COMMERCIAL

## 2023-12-22 DIAGNOSIS — Z00.8 ENCOUNTER FOR OTHER GENERAL EXAMINATION: ICD-10-CM

## 2023-12-22 DIAGNOSIS — Z98.890 OTHER SPECIFIED POSTPROCEDURAL STATES: Chronic | ICD-10-CM

## 2023-12-22 DIAGNOSIS — Z90.89 ACQUIRED ABSENCE OF OTHER ORGANS: Chronic | ICD-10-CM

## 2023-12-22 DIAGNOSIS — Z85.828 PERSONAL HISTORY OF OTHER MALIGNANT NEOPLASM OF SKIN: Chronic | ICD-10-CM

## 2023-12-22 DIAGNOSIS — Z96.653 PRESENCE OF ARTIFICIAL KNEE JOINT, BILATERAL: Chronic | ICD-10-CM

## 2023-12-22 PROCEDURE — 77066 DX MAMMO INCL CAD BI: CPT

## 2023-12-22 PROCEDURE — G0279: CPT | Mod: 26

## 2023-12-22 PROCEDURE — 76641 ULTRASOUND BREAST COMPLETE: CPT | Mod: 26,50

## 2023-12-22 PROCEDURE — 77066 DX MAMMO INCL CAD BI: CPT | Mod: 26

## 2023-12-22 PROCEDURE — 76641 ULTRASOUND BREAST COMPLETE: CPT

## 2023-12-22 PROCEDURE — G0279: CPT

## 2023-12-26 ENCOUNTER — RX RENEWAL (OUTPATIENT)
Age: 73
End: 2023-12-26

## 2023-12-26 RX ORDER — DULOXETINE HYDROCHLORIDE 60 MG/1
60 CAPSULE, DELAYED RELEASE PELLETS ORAL
Qty: 90 | Refills: 2 | Status: ACTIVE | COMMUNITY
Start: 2022-11-10 | End: 1900-01-01

## 2024-01-04 ENCOUNTER — APPOINTMENT (OUTPATIENT)
Dept: CARDIOLOGY | Facility: CLINIC | Age: 74
End: 2024-01-04
Payer: COMMERCIAL

## 2024-01-04 ENCOUNTER — NON-APPOINTMENT (OUTPATIENT)
Age: 74
End: 2024-01-04

## 2024-01-04 VITALS
WEIGHT: 253 LBS | HEART RATE: 67 BPM | BODY MASS INDEX: 41.65 KG/M2 | SYSTOLIC BLOOD PRESSURE: 141 MMHG | OXYGEN SATURATION: 96 % | HEIGHT: 65.5 IN | DIASTOLIC BLOOD PRESSURE: 69 MMHG

## 2024-01-04 VITALS — DIASTOLIC BLOOD PRESSURE: 83 MMHG | SYSTOLIC BLOOD PRESSURE: 136 MMHG

## 2024-01-04 DIAGNOSIS — I34.0 NONRHEUMATIC MITRAL (VALVE) INSUFFICIENCY: ICD-10-CM

## 2024-01-04 DIAGNOSIS — E78.5 HYPERLIPIDEMIA, UNSPECIFIED: ICD-10-CM

## 2024-01-04 DIAGNOSIS — R06.02 SHORTNESS OF BREATH: ICD-10-CM

## 2024-01-04 DIAGNOSIS — R73.03 PREDIABETES.: ICD-10-CM

## 2024-01-04 DIAGNOSIS — E66.9 OBESITY, UNSPECIFIED: ICD-10-CM

## 2024-01-04 PROCEDURE — 99214 OFFICE O/P EST MOD 30 MIN: CPT

## 2024-01-04 PROCEDURE — G0404: CPT

## 2024-01-04 NOTE — HISTORY OF PRESENT ILLNESS
[FreeTextEntry1] : 72yo woman presents for cardiology follow-up.  -H/o Breast cancer in Dec 2018 with well-differentiated invasive ductal carcinoma, 6mm, ER 98% strong, AL 80% moderate to strong, Her2 negative ([facility] 0), also with intermediate grade 1mm associated DCIS with focal necrosis identified. Inked margins were uninvolved, however there was invasive cancer present <1 mm from the margin. T1bN0. She was treated with surgery, lumpectomy and lymph notes; XRT; no chemotherapy. She was treated with anastrazole.  -Migraine h/a, obesity and depression (no longer on medication).  -She takes doxycycline (previously plaquenil) for systemic arthritis. -No h/o HTN. Not on statin therapy; borderline DM, hypothyroid -Jan 2023, she broke both ankles (mechanical fall), treated conservatively (no surgery).   Echo 2020: normal LVEF, 62%, with mild diastolic dysfunction, mildly dilated left atrium, mild mitral regurgitation, normal right heart size and function.   At her last visit with me in May 2023, the following issues were discussed: Diastolic heart failure (428.30) (I50.30) Numerous risk factors for HFpEF. LE edema improved with low-interval use diuretic.     Suggest continued diuretic use, every other day, as tolerate. She should follow her daily     weights. She was instructed to limit her salt intake. Mild elevation in BUN on last blood     draw; likely a bit dehydrated the day after taking her diuretic. Keep an eye on her labs     and appropriate fluid intake. Hyperlipidemia (272.4) (E78.5) Continues with statin use given pre-DM / increase CV risk. Last lipids under reasonable     control. Hypothyroidism (244.9) (E03.9) She continues with T-4 replacement; labs followed by PCP. Obesity (BMI 30-39.9) (278.00) (E66.9) Has continued to lose weight. Encouraged continued weight loss. Prediabetes (790.29) (R73.03) Monitoring her blood sugar. Mildly elevated A1c; seeing Endocrine. Considering GLP1     antag. Shortness of breath on exertion (786.05) (R06.02) No overt cardiac issues. Echo c/w normal LVEF, some diastolic dysfunction. BUCKLEY likely     from deconditioning.     August 2023 labs: tchol 142, trig 66, HDL 66, LDL 63 mg/dL  CBC normal CMP normal  Since last visit, continues to use the diuretic every other day; would consider using it more frequently in future (likely due to excess salt in diet). No changes to breathing pattern. Continues with her stable BUCKLEY. Continues on metformin; blood sugars have been improving. Sees Endocrine; not interested in weight loss medications. She has lost about 75 lbs without medications.   BP at home variable. Recently at Endo office, 136/94 mmHg.  ECG today: Sinus Rhythm  -RSR(V1) -nondiagnostic. -Nonspecific ST abnormalities. ABNORMAL

## 2024-01-04 NOTE — PHYSICAL EXAM
[Well Developed] : well developed [Well Nourished] : well nourished [No Acute Distress] : no acute distress [Obese] : obese [Normal Conjunctiva] : normal conjunctiva [Normal Venous Pressure] : normal venous pressure [No Carotid Bruit] : no carotid bruit [Normal S1, S2] : normal S1, S2 [No Murmur] : no murmur [No Rub] : no rub [No Gallop] : no gallop [Clear Lung Fields] : clear lung fields [Good Air Entry] : good air entry [No Respiratory Distress] : no respiratory distress  [Soft] : abdomen soft [Non Tender] : non-tender [No Masses/organomegaly] : no masses/organomegaly [Normal Bowel Sounds] : normal bowel sounds [Normal Gait] : normal gait [No Cyanosis] : no cyanosis [No Clubbing] : no clubbing [No Varicosities] : no varicosities [No Rash] : no rash [No Skin Lesions] : no skin lesions [Moves all extremities] : moves all extremities [No Focal Deficits] : no focal deficits [Normal Speech] : normal speech [Alert and Oriented] : alert and oriented [Normal memory] : normal memory [de-identified] : 1+ edema, with mild pitting

## 2024-01-16 NOTE — ASU DISCHARGE PLAN (ADULT/PEDIATRIC). - KEEP DRESSING DRY
Surgical folder given to patient's mother, legal guardian. Chlorhexidine (CHG) medicated soap and instructions (FYWB) given and reviewed.  Surgical consent signed by guardian.  PCP appointment for pre-op clearance and post op appointments scheduled. Patient verbalizes understanding.      
Statement Selected

## 2024-01-29 ENCOUNTER — RX RENEWAL (OUTPATIENT)
Age: 74
End: 2024-01-29

## 2024-01-29 RX ORDER — FUROSEMIDE 40 MG/1
40 TABLET ORAL
Qty: 90 | Refills: 1 | Status: ACTIVE | COMMUNITY
Start: 2021-09-30 | End: 1900-01-01

## 2024-02-09 ENCOUNTER — RX RENEWAL (OUTPATIENT)
Age: 74
End: 2024-02-09

## 2024-03-21 ENCOUNTER — APPOINTMENT (OUTPATIENT)
Dept: PULMONOLOGY | Facility: CLINIC | Age: 74
End: 2024-03-21
Payer: COMMERCIAL

## 2024-03-21 VITALS
HEART RATE: 68 BPM | HEIGHT: 65.5 IN | BODY MASS INDEX: 40.82 KG/M2 | DIASTOLIC BLOOD PRESSURE: 82 MMHG | TEMPERATURE: 97.8 F | OXYGEN SATURATION: 92 % | WEIGHT: 248 LBS | SYSTOLIC BLOOD PRESSURE: 144 MMHG

## 2024-03-21 DIAGNOSIS — G47.33 OBSTRUCTIVE SLEEP APNEA (ADULT) (PEDIATRIC): ICD-10-CM

## 2024-03-21 DIAGNOSIS — R06.83 SNORING: ICD-10-CM

## 2024-03-21 DIAGNOSIS — Z91.89 OTHER SPECIFIED PERSONAL RISK FACTORS, NOT ELSEWHERE CLASSIFIED: ICD-10-CM

## 2024-03-21 PROCEDURE — 99204 OFFICE O/P NEW MOD 45 MIN: CPT

## 2024-03-21 RX ORDER — RIMEGEPANT SULFATE 75 MG/75MG
75 TABLET, ORALLY DISINTEGRATING ORAL
Qty: 16 | Refills: 5 | Status: DISCONTINUED | COMMUNITY
Start: 2021-11-09 | End: 2024-03-21

## 2024-03-21 NOTE — PHYSICAL EXAM
[General Appearance - Well Developed] : well developed [General Appearance - Well Nourished] : well nourished [Enlarged Base of the Tongue] : enlargement of the base of the tongue [] : no respiratory distress [Neck Appearance] : the appearance of the neck was normal [Respiration, Rhythm And Depth] : normal respiratory rhythm and effort [Exaggerated Use Of Accessory Muscles For Inspiration] : no accessory muscle use [Abnormal Walk] : normal gait [Oriented To Time, Place, And Person] : oriented to person, place, and time [Impaired Insight] : insight and judgment were intact

## 2024-03-27 NOTE — REVIEW OF SYSTEMS
Pt arrived to ICU 2107. Heparin gtt infusing. Personal belonging at bedside. Pt A&Ox4.    [Dermatitis Radiation: Grade 0] : Dermatitis Radiation: Grade 0 [Pruritus: Grade 0] : Pruritus: Grade 0 [Alopecia: Grade 0] : Alopecia: Grade 0 [Skin Atrophy: Grade 0] : Skin Atrophy: Grade 0 [Skin Hyperpigmentation: Grade 1 - Hyperpigmentation covering <10% BSA; no psychosocial impact] : Skin Hyperpigmentation: Grade 1 - Hyperpigmentation covering <10% BSA; no psychosocial impact [Skin Induration: Grade 0] : Skin Induration: Grade 0

## 2024-04-01 ENCOUNTER — LABORATORY RESULT (OUTPATIENT)
Age: 74
End: 2024-04-01

## 2024-04-01 ENCOUNTER — APPOINTMENT (OUTPATIENT)
Dept: INTERNAL MEDICINE | Facility: CLINIC | Age: 74
End: 2024-04-01
Payer: COMMERCIAL

## 2024-04-01 ENCOUNTER — RX RENEWAL (OUTPATIENT)
Age: 74
End: 2024-04-01

## 2024-04-01 VITALS
WEIGHT: 241 LBS | BODY MASS INDEX: 39.67 KG/M2 | HEIGHT: 65.5 IN | DIASTOLIC BLOOD PRESSURE: 80 MMHG | SYSTOLIC BLOOD PRESSURE: 140 MMHG

## 2024-04-01 DIAGNOSIS — E03.9 HYPOTHYROIDISM, UNSPECIFIED: ICD-10-CM

## 2024-04-01 DIAGNOSIS — F32.A DEPRESSION, UNSPECIFIED: ICD-10-CM

## 2024-04-01 DIAGNOSIS — G43.909 MIGRAINE, UNSPECIFIED, NOT INTRACTABLE, W/OUT STATUS MIGRAINOSUS: ICD-10-CM

## 2024-04-01 DIAGNOSIS — I10 ESSENTIAL (PRIMARY) HYPERTENSION: ICD-10-CM

## 2024-04-01 DIAGNOSIS — I50.30 UNSPECIFIED DIASTOLIC (CONGESTIVE) HEART FAILURE: ICD-10-CM

## 2024-04-01 PROCEDURE — 36415 COLL VENOUS BLD VENIPUNCTURE: CPT

## 2024-04-01 PROCEDURE — 99214 OFFICE O/P EST MOD 30 MIN: CPT

## 2024-04-01 PROCEDURE — G2211 COMPLEX E/M VISIT ADD ON: CPT | Mod: NC,1L

## 2024-04-01 RX ORDER — FLUTICASONE PROPIONATE AND SALMETEROL 500; 50 UG/1; UG/1
500-50 POWDER RESPIRATORY (INHALATION) TWICE DAILY
Qty: 1 | Refills: 1 | Status: DISCONTINUED | COMMUNITY
Start: 2024-03-26 | End: 2024-04-01

## 2024-04-01 RX ORDER — LIOTHYRONINE SODIUM 5 UG/1
5 TABLET ORAL DAILY
Refills: 0 | Status: ACTIVE | COMMUNITY

## 2024-04-01 NOTE — HISTORY OF PRESENT ILLNESS
[FreeTextEntry1] : This is a 73-year-old female for evaluation and treatment of her dyslipidemia hypothyroidism hypertension HFpEF and breast carcinoma [de-identified] : Patient is feeling well.  She did follow-up with hematology oncology sleep medicine and cardiology.  She has no new complaints

## 2024-04-01 NOTE — ASSESSMENT
[FreeTextEntry1] : This is a 73-year-old female whose history has been reviewed above  She has a history of hypertension her blood pressure is above goal for most of her assessments.  However she states that her blood pressure at her endocrinologist was normal she is not anxious to take medications.  In addition she did have some orthostatic changes and on direct questioning states that occasionally she gets dizzy when she stands.  We did review that she particularly in the morning should take her time when she rises.  If this becomes more profound to let me know  She has a history of hypercholesterolemia she remains on atorvastatin 20 mg a cholesterol profile has been obtained medication changes predicated on the results  She has a history of mood disorder she remains on Cymbalta with good results serum sodium was obtained  She does have a history of diabetes she remains on metformin a hemoglobin A1c microalbumin and B12 levels were obtained  Her headaches seem to be somewhat better she did follow-up with neurology she remains on appropriate Tylenol and Rela Plavix  She did get a bone density which was mild osteopenia

## 2024-04-01 NOTE — PHYSICAL EXAM
[No JVD] : no jugular venous distention [Normal] : no respiratory distress, lungs were clear to auscultation bilaterally and no accessory muscle use [No Edema] : there was no peripheral edema [No Focal Deficits] : no focal deficits [de-identified] : Overweight

## 2024-04-02 DIAGNOSIS — E83.52 HYPERCALCEMIA: ICD-10-CM

## 2024-04-09 RX ORDER — ATORVASTATIN CALCIUM 20 MG/1
20 TABLET, FILM COATED ORAL
Qty: 90 | Refills: 3 | Status: ACTIVE | COMMUNITY
Start: 2021-11-18 | End: 1900-01-01

## 2024-04-16 DIAGNOSIS — E21.3 HYPERPARATHYROIDISM, UNSPECIFIED: ICD-10-CM

## 2024-04-16 LAB
ALBUMIN SERPL ELPH-MCNC: 4.5 G/DL
ALP BLD-CCNC: 53 U/L
ALT SERPL-CCNC: 12 U/L
ANION GAP SERPL CALC-SCNC: 9 MMOL/L
AST SERPL-CCNC: 22 U/L
BASOPHILS # BLD AUTO: 0.05 K/UL
BASOPHILS NFR BLD AUTO: 0.9 %
BILIRUB SERPL-MCNC: 0.4 MG/DL
BUN SERPL-MCNC: 26 MG/DL
CALCIUM SERPL-MCNC: 10.7 MG/DL
CALCIUM SERPL-MCNC: 10.8 MG/DL
CHLORIDE SERPL-SCNC: 101 MMOL/L
CHOLEST SERPL-MCNC: 155 MG/DL
CO2 SERPL-SCNC: 29 MMOL/L
CREAT SERPL-MCNC: 1.13 MG/DL
CREAT SPEC-SCNC: 197 MG/DL
EGFR: 52 ML/MIN/1.73M2
EOSINOPHIL # BLD AUTO: 0.29 K/UL
EOSINOPHIL NFR BLD AUTO: 5 %
ESTIMATED AVERAGE GLUCOSE: 117 MG/DL
GLUCOSE SERPL-MCNC: 106 MG/DL
HBA1C MFR BLD HPLC: 5.7 %
HCT VFR BLD CALC: 44.4 %
HDLC SERPL-MCNC: 72 MG/DL
HGB BLD-MCNC: 14 G/DL
IMM GRANULOCYTES NFR BLD AUTO: 0.2 %
LDLC SERPL CALC-MCNC: 71 MG/DL
LYMPHOCYTES # BLD AUTO: 1.19 K/UL
LYMPHOCYTES NFR BLD AUTO: 20.6 %
MAN DIFF?: NORMAL
MCHC RBC-ENTMCNC: 29 PG
MCHC RBC-ENTMCNC: 31.5 GM/DL
MCV RBC AUTO: 91.9 FL
MICROALBUMIN 24H UR DL<=1MG/L-MCNC: <1.2 MG/DL
MICROALBUMIN/CREAT 24H UR-RTO: NORMAL MG/G
MONOCYTES # BLD AUTO: 0.55 K/UL
MONOCYTES NFR BLD AUTO: 9.5 %
NEUTROPHILS # BLD AUTO: 3.68 K/UL
NEUTROPHILS NFR BLD AUTO: 63.8 %
NONHDLC SERPL-MCNC: 83 MG/DL
PARATHYROID HORMONE INTACT: 57 PG/ML
PLATELET # BLD AUTO: 276 K/UL
POTASSIUM SERPL-SCNC: 5.1 MMOL/L
PROT SERPL-MCNC: 7.1 G/DL
RBC # BLD: 4.83 M/UL
RBC # FLD: 13.8 %
SODIUM SERPL-SCNC: 139 MMOL/L
T3RU NFR SERPL: 0.9 TBI
T4 SERPL-MCNC: 10.5 UG/DL
TRIGL SERPL-MCNC: 64 MG/DL
TSH SERPL-ACNC: 0.05 UIU/ML
URATE SERPL-MCNC: 6.5 MG/DL
VIT B12 SERPL-MCNC: 959 PG/ML
WBC # FLD AUTO: 5.77 K/UL

## 2024-04-16 RX ORDER — LEVOTHYROXINE SODIUM 125 UG/1
125 TABLET ORAL DAILY
Qty: 30 | Refills: 0 | Status: COMPLETED | COMMUNITY
Start: 2020-01-30 | End: 2024-05-16

## 2024-04-17 ENCOUNTER — OUTPATIENT (OUTPATIENT)
Dept: OUTPATIENT SERVICES | Facility: HOSPITAL | Age: 74
LOS: 1 days | Discharge: HOME | End: 2024-04-17

## 2024-04-17 ENCOUNTER — APPOINTMENT (OUTPATIENT)
Dept: GASTROENTEROLOGY | Facility: CLINIC | Age: 74
End: 2024-04-17

## 2024-04-17 DIAGNOSIS — Z98.890 OTHER SPECIFIED POSTPROCEDURAL STATES: Chronic | ICD-10-CM

## 2024-04-17 DIAGNOSIS — Z90.89 ACQUIRED ABSENCE OF OTHER ORGANS: Chronic | ICD-10-CM

## 2024-04-17 DIAGNOSIS — C50.919 MALIGNANT NEOPLASM OF UNSPECIFIED SITE OF UNSPECIFIED FEMALE BREAST: ICD-10-CM

## 2024-04-23 ENCOUNTER — APPOINTMENT (OUTPATIENT)
Dept: NEUROLOGY | Facility: CLINIC | Age: 74
End: 2024-04-23
Payer: COMMERCIAL

## 2024-04-23 VITALS
BODY MASS INDEX: 40.15 KG/M2 | SYSTOLIC BLOOD PRESSURE: 135 MMHG | WEIGHT: 241 LBS | DIASTOLIC BLOOD PRESSURE: 81 MMHG | HEIGHT: 65 IN | OXYGEN SATURATION: 96 % | TEMPERATURE: 97.2 F | HEART RATE: 52 BPM

## 2024-04-23 VITALS
OXYGEN SATURATION: 96 % | HEIGHT: 65.5 IN | TEMPERATURE: 97.2 F | WEIGHT: 241 LBS | HEART RATE: 52 BPM | BODY MASS INDEX: 39.67 KG/M2 | DIASTOLIC BLOOD PRESSURE: 81 MMHG | SYSTOLIC BLOOD PRESSURE: 135 MMHG

## 2024-04-23 DIAGNOSIS — R51.9 HEADACHE, UNSPECIFIED: ICD-10-CM

## 2024-04-23 DIAGNOSIS — G89.29 HEADACHE, UNSPECIFIED: ICD-10-CM

## 2024-04-23 DIAGNOSIS — G51.31 CLONIC HEMIFACIAL SPASM, RIGHT: ICD-10-CM

## 2024-04-23 DIAGNOSIS — G90.2 HORNER'S SYNDROME: ICD-10-CM

## 2024-04-23 PROCEDURE — G2211 COMPLEX E/M VISIT ADD ON: CPT | Mod: NC,1L

## 2024-04-23 PROCEDURE — 99215 OFFICE O/P EST HI 40 MIN: CPT

## 2024-04-23 RX ORDER — FREMANEZUMAB-VFRM 225 MG/1.5ML
225 INJECTION SUBCUTANEOUS
Qty: 1 | Refills: 6 | Status: ACTIVE | COMMUNITY
Start: 2024-04-23 | End: 1900-01-01

## 2024-04-23 NOTE — PHYSICAL EXAM
[FreeTextEntry1] : Head:  Normocephalic Neck: Supple nontender.  Mental Status:  Alert Oriented X3 Speech normal and no aphasia or dysarthria.  Cranial Nerves: Mild anisocoria and asymmetry of the upper eyelids unchanged.  No facial spasm.  Visual fields full and extraocular movements are full.  There is no facial sensory loss or weakness and the tongue is in the midline.   Motor:  No drift, normal strength tone and coordination and no focal atrophy. No abnormal movements. No dysmetria.  Normal rapid alternating movements.   DTRs: Hypoactive.  Plantars flexor.  No Clonus.  Sensory: Unremarkable.  Gait: Remains improved.

## 2024-04-23 NOTE — HISTORY OF PRESENT ILLNESS
[FreeTextEntry1] : This patient is seen for an office visit.  She was last seen on 9/12/2023.  There has been an increased frequency of headache and she has developed rebound.  She would take eletriptan 40 mg and the headache would recur the following day.  This can happen 2 to 3 days at a time.  She is no longer taking Nurtec ODT 75 mg every other day.  There was an insurance issue with getting the prescription renewed.  She will occasionally take samples as needed with improvement.  In the past she took Aimovig but stopped the medication due to constipation.  For chronic pain she takes meloxicam 7.5 mg as needed and she receives duloxetine 60 mg once daily.  She did try different decongestants for possible sinus condition which did not improve the headache.  She denies recurrence of facial twitching with a past history of facial spasm.  She has chronic pain improved with acupuncture.  There has been dieting and weight loss and improved mood and improved gait and balance.

## 2024-04-23 NOTE — ASSESSMENT
[FreeTextEntry1] : Impression: This 73-year-old female patient has a chronic migraine headache disorder.  She has not had recurrent vertigo.  There has been an increase in headache frequency and she has developed rebound after Isamar triptan.  She has a chronic sinus condition improved.  She has not had any recurrence of hemifacial spasm.  She has no visual complaints.  Her depression has moderated.  Her chronic multifocal pain including low back pain is moderated.  Recommendations: For rebound I did suggest that she could take Nurtec 75 mg the same day that she takes eletriptan 40 mg as directed.  Also NSAIDs such as meloxicam 7.5 mg if taken could reduce rebound.  In the past she benefited from Aimovig but she developed constipation and it was stopped.  Begin a trial of Ajovy 225 mg subcutaneous once a month.  The order has been placed with in [facility] specialty pharmacy.  Office follow-up.

## 2024-04-23 NOTE — REASON FOR VISIT
[Follow-Up: _____] : a [unfilled] follow-up visit [FreeTextEntry1] : Migraine headache disorder and other medical problems

## 2024-04-24 ENCOUNTER — APPOINTMENT (OUTPATIENT)
Dept: HEMATOLOGY ONCOLOGY | Facility: CLINIC | Age: 74
End: 2024-04-24
Payer: COMMERCIAL

## 2024-04-24 VITALS
RESPIRATION RATE: 17 BRPM | SYSTOLIC BLOOD PRESSURE: 140 MMHG | DIASTOLIC BLOOD PRESSURE: 76 MMHG | HEART RATE: 50 BPM | BODY MASS INDEX: 41.09 KG/M2 | TEMPERATURE: 96.4 F | OXYGEN SATURATION: 97 % | WEIGHT: 246.9 LBS

## 2024-04-24 DIAGNOSIS — Z17.0 MALIGNANT NEOPLASM OF UPPER-OUTER QUADRANT OF RIGHT FEMALE BREAST: ICD-10-CM

## 2024-04-24 DIAGNOSIS — C50.411 MALIGNANT NEOPLASM OF UPPER-OUTER QUADRANT OF RIGHT FEMALE BREAST: ICD-10-CM

## 2024-04-24 DIAGNOSIS — M85.859 OTHER SPECIFIED DISORDERS OF BONE DENSITY AND STRUCTURE, UNSPECIFIED THIGH: ICD-10-CM

## 2024-04-24 PROCEDURE — 99213 OFFICE O/P EST LOW 20 MIN: CPT

## 2024-04-24 NOTE — HISTORY OF PRESENT ILLNESS
[Disease: _____________________] : Disease: [unfilled] [T: ___] : T[unfilled] [N: ___] : N[unfilled] [de-identified] : Age 68: right breast cancer\par  Screen detected: screening mammogram on 10/12/18 and was found to have questionable architectural distortions of the right breast. She was called back for a diagnostic mammogram on 10/23/18. It showed [id number] hypoechoic mass at 10 o'clock axis. She underwent core needle biopsy on 10/26/18 which revealed focal atypical duct hyperplasia with microcalcifications adjacent to benign breast tissue with dense stroma. She underwent a lumpectomy of the right breast on 12/18/18: well-differentiated invasive ductal carcinoma, 6mm, ER 98% strong, AZ 80% moderate to strong, Her2 negative ([facility] 0), also with intermediate grade 1mm associated DCIS with focal necrosis identified. Inked margins were uninvolved, however there was invasive cancer present <1 mm from the margin. Lymph nodes were not examined. No LVI present. T1bN0. Breast MRI 1/3/2019 revealed a 7-8 mm indeterminate enhancing mass in the medial breast adjacent to a postoperative hematoma/seroma. Subsequent targeted ultrasound demonstrated a 5 x 4 x 4 mm hypoechoic mass with angular margins at 1:00, areolar margin, adjacent to the postoperative collection. In accordance with this finding, sampling the lesion by fine-needle aspiration was performed, with subsequent ultrasound core needle biopsy. Under ultrasound guidance, in a lateral approach, an 18-gauge needle was introduced into the lesion, which decreased in size but did not completely resolve. Subsequently, a 12-gauge core needle was used to obtain multiple core specimens of the target at 1:00, areolar margin. Core biopsy: organizing hematoma, fat necrosis and negative for mammary epithelium. She completed adjuvant radiation and started anastrozole May 2019. She was followed by Dr Chan and had continuity of care with us on 12/29/2021. \par   [de-identified] : well-differentiated invasive ductal carcinoma, 6mm, ER 98% strong, CO 80% moderate to strong, Her2 negative ([facility] 0) [de-identified] : anastrozole 5/2019 to present  [de-identified] : She is tolerating anastrozole: willing to extend past 5 years. She is going to start Ajovy for migraines that are more frequent: has had migraines and no change in the symptoms. No dizziness or weakness. She also will be starting evening primrose. Denies any new breast pain or chest wall changes. No back pain, cough or HA. She will be having MRI of the breast done soon.

## 2024-04-24 NOTE — REVIEW OF SYSTEMS
[Diarrhea: Grade 0] : Diarrhea: Grade 0 [Dizziness] : no dizziness [Fainting] : no fainting [Difficulty Walking] : no difficulty walking [Negative] : Allergic/Immunologic [de-identified] : increased migraines

## 2024-04-24 NOTE — ASSESSMENT
[FreeTextEntry1] : She is a 74 y/o  F with Stage I right breast invasive ductal carcinoma that is ER/MA positive and Wxh1nas negative s/p lumpectomy and SLN biopsy followed by RT and on anastrozole since 5/2019. She continues to tolerate anastrozole and will complete 5 years in 2024. We reviewed duration of therapy 5 to 7 years: reviewed data from Dr Giuliano JARAMILLO 2021. She feels she is tolerating anastrozole and willing to extend up to 7 years. She will have bone density 2025. We reviewed calcium and Vitamin D supplementation along with exercise to maintain bone health. She is up to date with breast imaging and exam. Reviewed blood work done by PCP in April: LFTs remain WNL. Questions answered to her satisfaction. She is agreeable with plan. Next follow up in 6 months but earlier if any new symptoms.

## 2024-04-24 NOTE — PHYSICAL EXAM
[Obese] : obese [Normal] : affect appropriate [de-identified] : periareolar scar R breast; no abnl masses or axillary LN palpable; no abnl masses or nodules palpated over sternum [de-identified] : multiple skin nevus and skin excisions for skin cancer  [de-identified] : using rolling walker for walking

## 2024-04-26 ENCOUNTER — APPOINTMENT (OUTPATIENT)
Dept: GASTROENTEROLOGY | Facility: CLINIC | Age: 74
End: 2024-04-26
Payer: COMMERCIAL

## 2024-04-26 VITALS
HEIGHT: 65 IN | WEIGHT: 245 LBS | SYSTOLIC BLOOD PRESSURE: 146 MMHG | OXYGEN SATURATION: 98 % | DIASTOLIC BLOOD PRESSURE: 78 MMHG | BODY MASS INDEX: 40.82 KG/M2 | HEART RATE: 55 BPM

## 2024-04-26 DIAGNOSIS — R19.4 CHANGE IN BOWEL HABIT: ICD-10-CM

## 2024-04-26 PROCEDURE — 99205 OFFICE O/P NEW HI 60 MIN: CPT

## 2024-04-26 NOTE — PHYSICAL EXAM
[No Acute Distress] : no acute distress [Obese (BMI >= 30)] : obese (BMI >= 30) [No Respiratory Distress] : no respiratory distress [Heart Rate And Rhythm] : heart rate was normal and rhythm regular [Oriented To Time, Place, And Person] : oriented to person, place, and time

## 2024-04-26 NOTE — ASSESSMENT
[FreeTextEntry1] : 73F breast cancer s/p lumpectomy and XRT on anastrazole, migraines, asthma, depression and HFpEF who presents to discuss irregular bowel habits over the last 6 months.  #Change in bowel habits #Bloating  - improvement in bowel consistency with addition of fiber supplement (inulin) suggests inadequate dietary fiber may have been playing a role, but still not back to her normal bowel movements - discussed that time course does not fit infectious etiology, no preceding illness to suggest post-infectious IBS, no history of CCY to suggest BAD, no blood to suggest IBD or malignancy and recent negative colonoscopy - discussed that several of her medications (meloxicam, duloxetine, magnesium) can cause symptoms like this and it is suspicious that she increased duloxetine and added magnesium and then had onset of symptoms a month or two later; recommended stopping magnesium and trying to downtitrate duloxetine back to 30 mg daily - will check fecal elastase and fecal fat to evaluate for EPI given report of floating stools and new change in bowel habits; discussed that sample can be soft but should not be watery - will check fecal calprotectin to screen for IBD (unlikely and other symptoms do not fit but bimodal distribution) - add metamucil twice daily - if unrevealing workup and no improvement consider colonoscopy vs flex sig with biopsy to evaluate for microscopic colitis - re: bloating, try to avoid high FODMAP foods and assess for improvement  #Hiatal hernia #Early satiety #Heartburn controlled with Pepcid  - discussed that very early satiety is an unusual hiatal hernia symptom and suggests that hers is very large and may benefit from surgical correction - discussed that GERD symptoms currently controlled with Pepcid are likely 2/2 significant HH - will discuss in more depth at next visit but consider TBE +/- EGD to evaluate anatomy - asked to send records of previous procedure

## 2024-04-26 NOTE — HISTORY OF PRESENT ILLNESS
[FreeTextEntry1] : Ms. Dione Ching is a 72 yo woman with PMHx breast cancer s/p lumpectomy and XRT on anastrazole, migraines, asthma, depression and HFpEF who presents to discuss irregular bowel habits over the last 6 months.   She reports that 6 months ago she started having loose stools ("like soil poured into the toilet bowl"). She reports that this lasted for about 3 months, and that her stool has since become more formed but is now very soft. She also has urgency and rare intermittent FI. In the past she would have soft stools associated with urgency but this would last for a week or two and then resolve on its own. She usually has one bowel movement a day, though sometimes she will skip a day and sometimes she will have three a day. Has been taking inulin gummies and align with some improvement. Notes her stools partially float. No bloody or black stools.    Had an unrevealing EGD/colonoscopy in 2020. These records are not available for my review. She reports that the only abnormality was a hiatal hernia which "made my stomach look like an hourglass." She does not have any abdominal pain or nausea with eating but gets full very easily. An appetizer and bread will make her feel full - this has been going on for 5 years. No dysphagia, but when really full will have substernal pressure and if she continues to eat she will throw up. Denies surgeries on her stomach.  Has been trying to lose weight and has not been eating as much, wonders if this could have changed her bowel movements. Recently added more nuts to her diet but this caused no difference in her bowel movements, generally avoids dairy but notes she has been having a little more dairy recently, which she takes Lactaid for. Bananas are now a problem, cause a lot of gas. Feels she is sensitive to gluten, max is 2 slices of bread and then will have gas and cramping. Otherwise has not been able to reliably tie gas to eating. Drinks 8 oz of diet coke diluted with water a day, no other carbonated beverages. Sometimes gas and cramping is better after bowel movement, sometimes will have belly discomfort after a bowel movement for about an hour.   No illnesses or foreign travel preceding onset of symptoms. Started taking metformin in 2023 but didn't have symptoms until 6 months after that. Does note that she started taking magnesium and increased duloxetine from 30 mg to 60 mg daily in September (depressive episode).   Meds: anastrozole, metformin, duloxetine 60 mg, synthroid, propranolol, meloxicam daily (for years), atorvastatin, pepcid BID, ASA, magnesium 325 mg. Has heartburn and regurgitation if she does not take Pepcid.

## 2024-05-13 ENCOUNTER — OUTPATIENT (OUTPATIENT)
Dept: OUTPATIENT SERVICES | Facility: HOSPITAL | Age: 74
LOS: 1 days | End: 2024-05-13
Payer: COMMERCIAL

## 2024-05-13 ENCOUNTER — APPOINTMENT (OUTPATIENT)
Dept: MRI IMAGING | Facility: IMAGING CENTER | Age: 74
End: 2024-05-13
Payer: COMMERCIAL

## 2024-05-13 DIAGNOSIS — Z98.890 OTHER SPECIFIED POSTPROCEDURAL STATES: Chronic | ICD-10-CM

## 2024-05-13 DIAGNOSIS — Z96.653 PRESENCE OF ARTIFICIAL KNEE JOINT, BILATERAL: Chronic | ICD-10-CM

## 2024-05-13 DIAGNOSIS — Z90.89 ACQUIRED ABSENCE OF OTHER ORGANS: Chronic | ICD-10-CM

## 2024-05-13 DIAGNOSIS — Z00.8 ENCOUNTER FOR OTHER GENERAL EXAMINATION: ICD-10-CM

## 2024-05-13 DIAGNOSIS — Z85.828 PERSONAL HISTORY OF OTHER MALIGNANT NEOPLASM OF SKIN: Chronic | ICD-10-CM

## 2024-05-13 PROCEDURE — C8908: CPT

## 2024-05-13 PROCEDURE — 77049 MRI BREAST C-+ W/CAD BI: CPT | Mod: 26

## 2024-05-13 PROCEDURE — C8937: CPT

## 2024-05-13 PROCEDURE — A9585: CPT

## 2024-06-03 RX ORDER — PROPRANOLOL HYDROCHLORIDE 40 MG/1
40 TABLET ORAL
Qty: 180 | Refills: 2 | Status: ACTIVE | COMMUNITY
Start: 2020-06-01 | End: 1900-01-01

## 2024-06-07 RX ORDER — ANASTROZOLE TABLETS 1 MG/1
1 TABLET ORAL
Qty: 90 | Refills: 3 | Status: ACTIVE | COMMUNITY
Start: 2019-02-13 | End: 1900-01-01

## 2024-06-28 ENCOUNTER — APPOINTMENT (OUTPATIENT)
Dept: GASTROENTEROLOGY | Facility: CLINIC | Age: 74
End: 2024-06-28

## 2024-07-02 RX ORDER — NIRMATRELVIR AND RITONAVIR 150-100 MG
10 X 150 MG & KIT ORAL
Qty: 1 | Refills: 0 | Status: ACTIVE | COMMUNITY
Start: 2024-07-02 | End: 1900-01-01

## 2024-07-10 ENCOUNTER — APPOINTMENT (OUTPATIENT)
Dept: OTOLARYNGOLOGY | Facility: CLINIC | Age: 74
End: 2024-07-10

## 2024-07-11 ENCOUNTER — NON-APPOINTMENT (OUTPATIENT)
Age: 74
End: 2024-07-11

## 2024-07-11 ENCOUNTER — APPOINTMENT (OUTPATIENT)
Dept: CARDIOLOGY | Facility: CLINIC | Age: 74
End: 2024-07-11
Payer: COMMERCIAL

## 2024-07-11 VITALS
BODY MASS INDEX: 39.99 KG/M2 | OXYGEN SATURATION: 96 % | SYSTOLIC BLOOD PRESSURE: 129 MMHG | HEART RATE: 56 BPM | DIASTOLIC BLOOD PRESSURE: 83 MMHG | HEIGHT: 65 IN | WEIGHT: 240 LBS

## 2024-07-11 DIAGNOSIS — I34.0 NONRHEUMATIC MITRAL (VALVE) INSUFFICIENCY: ICD-10-CM

## 2024-07-11 DIAGNOSIS — E78.5 HYPERLIPIDEMIA, UNSPECIFIED: ICD-10-CM

## 2024-07-11 DIAGNOSIS — R73.03 PREDIABETES.: ICD-10-CM

## 2024-07-11 DIAGNOSIS — I10 ESSENTIAL (PRIMARY) HYPERTENSION: ICD-10-CM

## 2024-07-11 DIAGNOSIS — I50.30 UNSPECIFIED DIASTOLIC (CONGESTIVE) HEART FAILURE: ICD-10-CM

## 2024-07-11 DIAGNOSIS — E03.9 HYPOTHYROIDISM, UNSPECIFIED: ICD-10-CM

## 2024-07-11 DIAGNOSIS — R06.02 SHORTNESS OF BREATH: ICD-10-CM

## 2024-07-11 DIAGNOSIS — E66.9 OBESITY, UNSPECIFIED: ICD-10-CM

## 2024-07-11 PROCEDURE — 93000 ELECTROCARDIOGRAM COMPLETE: CPT

## 2024-07-11 PROCEDURE — 99213 OFFICE O/P EST LOW 20 MIN: CPT

## 2024-07-11 PROCEDURE — G2211 COMPLEX E/M VISIT ADD ON: CPT | Mod: NC

## 2024-07-11 PROCEDURE — G0404: CPT

## 2024-07-16 ENCOUNTER — APPOINTMENT (OUTPATIENT)
Dept: OBGYN | Facility: CLINIC | Age: 74
End: 2024-07-16
Payer: COMMERCIAL

## 2024-07-16 VITALS
WEIGHT: 240 LBS | BODY MASS INDEX: 39.99 KG/M2 | HEIGHT: 65 IN | DIASTOLIC BLOOD PRESSURE: 75 MMHG | SYSTOLIC BLOOD PRESSURE: 115 MMHG

## 2024-07-16 DIAGNOSIS — Z01.411 ENCOUNTER FOR GYNECOLOGICAL EXAMINATION (GENERAL) (ROUTINE) WITH ABNORMAL FINDINGS: ICD-10-CM

## 2024-07-16 DIAGNOSIS — R39.9 UNSPECIFIED SYMPTOMS AND SIGNS INVOLVING THE GENITOURINARY SYSTEM: ICD-10-CM

## 2024-07-16 DIAGNOSIS — N76.0 ACUTE VAGINITIS: ICD-10-CM

## 2024-07-16 LAB
BILIRUB UR QL STRIP: NORMAL
CLARITY UR: CLEAR
COLLECTION METHOD: NORMAL
GLUCOSE UR-MCNC: NORMAL
HCG UR QL: 0.2 EU/DL
HGB UR QL STRIP.AUTO: NORMAL
KETONES UR-MCNC: NORMAL
LEUKOCYTE ESTERASE UR QL STRIP: NORMAL
NITRITE UR QL STRIP: NORMAL
PH UR STRIP: 5.5
PROT UR STRIP-MCNC: NORMAL
SP GR UR STRIP: >1.03

## 2024-07-16 PROCEDURE — 99459 PELVIC EXAMINATION: CPT

## 2024-07-16 PROCEDURE — 99397 PER PM REEVAL EST PAT 65+ YR: CPT

## 2024-07-16 PROCEDURE — 99213 OFFICE O/P EST LOW 20 MIN: CPT | Mod: 25

## 2024-07-16 PROCEDURE — 81002 URINALYSIS NONAUTO W/O SCOPE: CPT

## 2024-07-18 LAB
BACTERIA UR CULT: NORMAL
BV BACTERIA RRNA VAG QL NAA+PROBE: NOT DETECTED
C GLABRATA RNA VAG QL NAA+PROBE: NOT DETECTED
C TRACH RRNA SPEC QL NAA+PROBE: NOT DETECTED
CANDIDA RRNA VAG QL PROBE: NOT DETECTED
N GONORRHOEA RRNA SPEC QL NAA+PROBE: NOT DETECTED
T VAGINALIS RRNA SPEC QL NAA+PROBE: NOT DETECTED

## 2024-07-22 LAB — HPV HIGH+LOW RISK DNA PNL CVX: NOT DETECTED

## 2024-07-23 LAB — CYTOLOGY CVX/VAG DOC THIN PREP: ABNORMAL

## 2024-08-08 ENCOUNTER — APPOINTMENT (OUTPATIENT)
Dept: OBGYN | Facility: CLINIC | Age: 74
End: 2024-08-08

## 2024-08-25 ENCOUNTER — NON-APPOINTMENT (OUTPATIENT)
Age: 74
End: 2024-08-25

## 2024-08-26 ENCOUNTER — APPOINTMENT (OUTPATIENT)
Dept: NEUROLOGY | Facility: CLINIC | Age: 74
End: 2024-08-26
Payer: COMMERCIAL

## 2024-08-26 VITALS
DIASTOLIC BLOOD PRESSURE: 73 MMHG | HEART RATE: 56 BPM | BODY MASS INDEX: 38.57 KG/M2 | TEMPERATURE: 97.7 F | OXYGEN SATURATION: 96 % | HEIGHT: 66 IN | SYSTOLIC BLOOD PRESSURE: 126 MMHG | WEIGHT: 240 LBS

## 2024-08-26 VITALS
WEIGHT: 240 LBS | DIASTOLIC BLOOD PRESSURE: 73 MMHG | TEMPERATURE: 97.7 F | BODY MASS INDEX: 38.57 KG/M2 | HEART RATE: 56 BPM | SYSTOLIC BLOOD PRESSURE: 126 MMHG | OXYGEN SATURATION: 96 % | HEIGHT: 66 IN

## 2024-08-26 DIAGNOSIS — R51.9 HEADACHE, UNSPECIFIED: ICD-10-CM

## 2024-08-26 DIAGNOSIS — M54.16 RADICULOPATHY, LUMBAR REGION: ICD-10-CM

## 2024-08-26 DIAGNOSIS — G25.0 ESSENTIAL TREMOR: ICD-10-CM

## 2024-08-26 DIAGNOSIS — G89.29 HEADACHE, UNSPECIFIED: ICD-10-CM

## 2024-08-26 DIAGNOSIS — G51.31 CLONIC HEMIFACIAL SPASM, RIGHT: ICD-10-CM

## 2024-08-26 PROCEDURE — G2211 COMPLEX E/M VISIT ADD ON: CPT | Mod: NC

## 2024-08-26 PROCEDURE — 99214 OFFICE O/P EST MOD 30 MIN: CPT

## 2024-08-26 NOTE — ASSESSMENT
[FreeTextEntry1] : Impression: This 73-year-old female patient has a chronic migraine headache disorder which is improved with Ajovy 2025 mg subcutaneous once a month.  Aimovig caused constipation it was stopped and Nurtec was stopped.  She takes eletriptan as needed 40 mg as directed.  She denies recurrence of hemifacial spasm.  There is no change in anisocoria of the pupils.  This finding is mild.  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  She has chronic lower back pain with spondylosis/spinal stenosis on MRI.   Recommendations: Continue Ajovy 225 mg subcutaneous once a month.  Isamar triptan 40 mg to be taken as needed as directed.  The patient also receives meloxicam 7.5 mg once daily for her chronic pain and rheumatoid condition.  She will be pursuing physical therapy for her low back condition.  Office follow-up as directed.

## 2024-08-26 NOTE — PHYSICAL EXAM
[FreeTextEntry1] : Head:  Normocephalic mild intermittent cranial tremor.  Neck: Supple nontender.  Mental Status:  Alert Oriented X3 Speech normal and no aphasia or dysarthria.  Cranial Nerves: Mild anisocoria unchanged.  Visual Fields full  EOMI no diplopia no ptosis no nystagmus, V through XII intact.  No facial spasm seen.  Motor:  No drift, normal strength tone and coordination and no focal atrophy. No abnormal movements. No dysmetria.  Normal rapid alternating movements.   DTRs:  hypoactive unchanged.  Plantars flexor.  No Clonus.  Sensory:  Normal testing with pin light touch.  Gait: Unchanged mildly unsteady.

## 2024-08-26 NOTE — HISTORY OF PRESENT ILLNESS
[FreeTextEntry1] : This patient is seen for an office visit.  She was last seen on 4/23/2024.  Her headaches have improved with addition of Ajovy 225 mg subcutaneous once a month.  She is not having any side effects.  She can have 10 headache days a month but the intensity and severity are less and she denies rebound the following day which she did have previously.  She takes eletriptan 40 mg as needed.  She is no longer taking Nurtec.  She has chronic pain for which she takes meloxicam 7.5 mg duloxetine 60 mg oral once daily and she has a history of rheumatoid arthritis.  She has recently noted a cranial tremor and she denies extremity tremor.  She denies any other motor complaints in association.  She denies recurrence of the facial twitching.  She saw a specialist for her lower back pain and she did have a lumbar MRI on 7/9/2024 revealing multilevel spondylosis and there was severe spinal stenosis at the L4-5 level with anterolisthesis.

## 2024-09-06 ENCOUNTER — ASOB RESULT (OUTPATIENT)
Age: 74
End: 2024-09-06

## 2024-09-06 ENCOUNTER — APPOINTMENT (OUTPATIENT)
Dept: OBGYN | Facility: CLINIC | Age: 74
End: 2024-09-06
Payer: COMMERCIAL

## 2024-09-06 PROCEDURE — 76830 TRANSVAGINAL US NON-OB: CPT

## 2024-09-18 ENCOUNTER — APPOINTMENT (OUTPATIENT)
Dept: SURGERY | Facility: CLINIC | Age: 74
End: 2024-09-18
Payer: COMMERCIAL

## 2024-09-18 PROCEDURE — 99213K: CUSTOM

## 2024-10-03 ENCOUNTER — APPOINTMENT (OUTPATIENT)
Dept: OTOLARYNGOLOGY | Facility: CLINIC | Age: 74
End: 2024-10-03
Payer: COMMERCIAL

## 2024-10-03 DIAGNOSIS — R26.89 OTHER ABNORMALITIES OF GAIT AND MOBILITY: ICD-10-CM

## 2024-10-03 DIAGNOSIS — H90.3 SENSORINEURAL HEARING LOSS, BILATERAL: ICD-10-CM

## 2024-10-03 PROCEDURE — 99214 OFFICE O/P EST MOD 30 MIN: CPT

## 2024-10-03 PROCEDURE — 92557 COMPREHENSIVE HEARING TEST: CPT

## 2024-10-03 PROCEDURE — 92567 TYMPANOMETRY: CPT

## 2024-10-04 PROBLEM — R26.89 IMBALANCE: Status: ACTIVE | Noted: 2024-10-04

## 2024-10-04 NOTE — HISTORY OF PRESENT ILLNESS
[de-identified] : 75 y/o F presents for follow up for hearing loss, last seen in July 2022. She reports hearing is overall stable since last seen, but worse in crowds. She reports few episodes of intermittent vertigo which usually resolves when laying down or not moving, last episode about 6 weeks ago. History of migraines, now on Ajovy injections. Denies otalgia, otorrhea, ear infections. Last MRI "quite a while ago" vertigo 30 min to several hours - also hx of migraines

## 2024-10-04 NOTE — HISTORY OF PRESENT ILLNESS
[de-identified] : 75 y/o F presents for follow up for hearing loss, last seen in July 2022. She reports hearing is overall stable since last seen, but worse in crowds. She reports few episodes of intermittent vertigo which usually resolves when laying down or not moving, last episode about 6 weeks ago. History of migraines, now on Ajovy injections. Denies otalgia, otorrhea, ear infections. Last MRI "quite a while ago" vertigo 30 min to several hours - also hx of migraines

## 2024-10-04 NOTE — DATA REVIEWED
[de-identified] : Hearing is -1500 Hz, mild to moderately severe SNHL thereafter AU. Type A (normal) tympanograms AU.

## 2024-10-04 NOTE — DATA REVIEWED
[de-identified] : Hearing is -1500 Hz, mild to moderately severe SNHL thereafter AU. Type A (normal) tympanograms AU.

## 2024-10-04 NOTE — PHYSICAL EXAM
[Midline] : trachea located in midline position [] : [place]-Hallpike test is negative [Normal] : no rashes

## 2024-10-04 NOTE — PHYSICAL EXAM
[Midline] : trachea located in midline position [] : [place]-Hallpike test is negative [Normal] : no rashes

## 2024-10-07 ENCOUNTER — LABORATORY RESULT (OUTPATIENT)
Age: 74
End: 2024-10-07

## 2024-10-07 ENCOUNTER — APPOINTMENT (OUTPATIENT)
Dept: INTERNAL MEDICINE | Facility: CLINIC | Age: 74
End: 2024-10-07
Payer: COMMERCIAL

## 2024-10-07 ENCOUNTER — NON-APPOINTMENT (OUTPATIENT)
Age: 74
End: 2024-10-07

## 2024-10-07 VITALS
SYSTOLIC BLOOD PRESSURE: 138 MMHG | HEIGHT: 66 IN | DIASTOLIC BLOOD PRESSURE: 82 MMHG | WEIGHT: 242 LBS | BODY MASS INDEX: 38.89 KG/M2

## 2024-10-07 DIAGNOSIS — Z23 ENCOUNTER FOR IMMUNIZATION: ICD-10-CM

## 2024-10-07 PROCEDURE — 99397 PER PM REEVAL EST PAT 65+ YR: CPT | Mod: 25

## 2024-10-07 PROCEDURE — 90677 PCV20 VACCINE IM: CPT

## 2024-10-07 PROCEDURE — 90656 IIV3 VACC NO PRSV 0.5 ML IM: CPT

## 2024-10-07 PROCEDURE — 90472 IMMUNIZATION ADMIN EACH ADD: CPT

## 2024-10-07 PROCEDURE — 93000 ELECTROCARDIOGRAM COMPLETE: CPT

## 2024-10-07 PROCEDURE — G0009: CPT

## 2024-10-07 PROCEDURE — 99214 OFFICE O/P EST MOD 30 MIN: CPT | Mod: 25

## 2024-10-07 PROCEDURE — 36415 COLL VENOUS BLD VENIPUNCTURE: CPT

## 2024-10-08 ENCOUNTER — RESULT REVIEW (OUTPATIENT)
Age: 74
End: 2024-10-08

## 2024-10-08 LAB
25(OH)D3 SERPL-MCNC: 50.2 NG/ML
ALBUMIN SERPL ELPH-MCNC: 4.3 G/DL
ALP BLD-CCNC: 60 U/L
ALT SERPL-CCNC: 10 U/L
ANION GAP SERPL CALC-SCNC: 10 MMOL/L
APPEARANCE: CLEAR
AST SERPL-CCNC: 18 U/L
BASOPHILS # BLD AUTO: 0.04 K/UL
BASOPHILS NFR BLD AUTO: 0.7 %
BILIRUB SERPL-MCNC: 0.3 MG/DL
BILIRUBIN URINE: NEGATIVE
BLOOD URINE: NEGATIVE
BUN SERPL-MCNC: 30 MG/DL
CALCIUM SERPL-MCNC: 10.2 MG/DL
CALCIUM SERPL-MCNC: 10.3 MG/DL
CHLORIDE SERPL-SCNC: 103 MMOL/L
CHOLEST SERPL-MCNC: 144 MG/DL
CO2 SERPL-SCNC: 28 MMOL/L
COLOR: YELLOW
CREAT SERPL-MCNC: 1.27 MG/DL
EGFR: 45 ML/MIN/1.73M2
EOSINOPHIL # BLD AUTO: 0.35 K/UL
EOSINOPHIL NFR BLD AUTO: 6.2 %
ESTIMATED AVERAGE GLUCOSE: 105 MG/DL
GLUCOSE QUALITATIVE U: NEGATIVE MG/DL
GLUCOSE SERPL-MCNC: 116 MG/DL
HBA1C MFR BLD HPLC: 5.3 %
HCT VFR BLD CALC: 40.4 %
HDLC SERPL-MCNC: 60 MG/DL
HGB BLD-MCNC: 12.1 G/DL
IMM GRANULOCYTES NFR BLD AUTO: 0.2 %
KETONES URINE: NEGATIVE MG/DL
LDLC SERPL CALC-MCNC: 62 MG/DL
LEUKOCYTE ESTERASE URINE: NEGATIVE
LYMPHOCYTES # BLD AUTO: 1.59 K/UL
LYMPHOCYTES NFR BLD AUTO: 27.9 %
MAGNESIUM SERPL-MCNC: 2.1 MG/DL
MAN DIFF?: NORMAL
MCHC RBC-ENTMCNC: 28.9 PG
MCHC RBC-ENTMCNC: 30 GM/DL
MCV RBC AUTO: 96.7 FL
MONOCYTES # BLD AUTO: 0.59 K/UL
MONOCYTES NFR BLD AUTO: 10.4 %
NEUTROPHILS # BLD AUTO: 3.11 K/UL
NEUTROPHILS NFR BLD AUTO: 54.6 %
NITRITE URINE: NEGATIVE
NONHDLC SERPL-MCNC: 84 MG/DL
PARATHYROID HORMONE INTACT: 79 PG/ML
PH URINE: 5.5
PLATELET # BLD AUTO: 317 K/UL
POTASSIUM SERPL-SCNC: 4.8 MMOL/L
PROT SERPL-MCNC: 7 G/DL
PROTEIN URINE: NEGATIVE MG/DL
RBC # BLD: 4.18 M/UL
RBC # FLD: 14.7 %
SODIUM SERPL-SCNC: 141 MMOL/L
SPECIFIC GRAVITY URINE: 1.01
T3RU NFR SERPL: 1 TBI
T4 SERPL-MCNC: 9.3 UG/DL
TRIGL SERPL-MCNC: 129 MG/DL
TSH SERPL-ACNC: 0.05 UIU/ML
URATE SERPL-MCNC: 6.6 MG/DL
UROBILINOGEN URINE: 0.2 MG/DL
VIT B12 SERPL-MCNC: 805 PG/ML
WBC # FLD AUTO: 5.69 K/UL

## 2024-10-15 ENCOUNTER — APPOINTMENT (OUTPATIENT)
Dept: MRI IMAGING | Facility: HOSPITAL | Age: 74
End: 2024-10-15
Payer: COMMERCIAL

## 2024-10-15 ENCOUNTER — OUTPATIENT (OUTPATIENT)
Dept: OUTPATIENT SERVICES | Facility: HOSPITAL | Age: 74
LOS: 1 days | End: 2024-10-15
Payer: COMMERCIAL

## 2024-10-15 DIAGNOSIS — G43.109 MIGRAINE WITH AURA, NOT INTRACTABLE, WITHOUT STATUS MIGRAINOSUS: ICD-10-CM

## 2024-10-15 DIAGNOSIS — Z98.890 OTHER SPECIFIED POSTPROCEDURAL STATES: Chronic | ICD-10-CM

## 2024-10-15 DIAGNOSIS — Z90.89 ACQUIRED ABSENCE OF OTHER ORGANS: Chronic | ICD-10-CM

## 2024-10-15 DIAGNOSIS — Z96.653 PRESENCE OF ARTIFICIAL KNEE JOINT, BILATERAL: Chronic | ICD-10-CM

## 2024-10-15 DIAGNOSIS — Z85.828 PERSONAL HISTORY OF OTHER MALIGNANT NEOPLASM OF SKIN: Chronic | ICD-10-CM

## 2024-10-15 DIAGNOSIS — H90.3 SENSORINEURAL HEARING LOSS, BILATERAL: ICD-10-CM

## 2024-10-15 PROCEDURE — 70553 MRI BRAIN STEM W/O & W/DYE: CPT | Mod: 26

## 2024-10-15 PROCEDURE — A9579: CPT

## 2024-10-15 PROCEDURE — 70553 MRI BRAIN STEM W/O & W/DYE: CPT

## 2024-10-18 ENCOUNTER — NON-APPOINTMENT (OUTPATIENT)
Age: 74
End: 2024-10-18

## 2024-10-31 ENCOUNTER — OUTPATIENT (OUTPATIENT)
Dept: OUTPATIENT SERVICES | Facility: HOSPITAL | Age: 74
LOS: 1 days | Discharge: HOME | End: 2024-10-31

## 2024-10-31 DIAGNOSIS — Z98.890 OTHER SPECIFIED POSTPROCEDURAL STATES: Chronic | ICD-10-CM

## 2024-10-31 DIAGNOSIS — Z96.653 PRESENCE OF ARTIFICIAL KNEE JOINT, BILATERAL: Chronic | ICD-10-CM

## 2024-10-31 DIAGNOSIS — Z85.828 PERSONAL HISTORY OF OTHER MALIGNANT NEOPLASM OF SKIN: Chronic | ICD-10-CM

## 2024-10-31 DIAGNOSIS — Z90.89 ACQUIRED ABSENCE OF OTHER ORGANS: Chronic | ICD-10-CM

## 2024-10-31 DIAGNOSIS — C50.919 MALIGNANT NEOPLASM OF UNSPECIFIED SITE OF UNSPECIFIED FEMALE BREAST: ICD-10-CM

## 2024-11-04 ENCOUNTER — APPOINTMENT (OUTPATIENT)
Dept: HEMATOLOGY ONCOLOGY | Facility: CLINIC | Age: 74
End: 2024-11-04
Payer: COMMERCIAL

## 2024-11-04 VITALS
DIASTOLIC BLOOD PRESSURE: 73 MMHG | SYSTOLIC BLOOD PRESSURE: 125 MMHG | HEART RATE: 52 BPM | TEMPERATURE: 97.3 F | BODY MASS INDEX: 39.06 KG/M2 | RESPIRATION RATE: 18 BRPM | OXYGEN SATURATION: 98 % | WEIGHT: 242 LBS

## 2024-11-04 DIAGNOSIS — C50.411 MALIGNANT NEOPLASM OF UPPER-OUTER QUADRANT OF RIGHT FEMALE BREAST: ICD-10-CM

## 2024-11-04 DIAGNOSIS — Z17.0 MALIGNANT NEOPLASM OF UPPER-OUTER QUADRANT OF RIGHT FEMALE BREAST: ICD-10-CM

## 2024-11-04 PROCEDURE — 99214 OFFICE O/P EST MOD 30 MIN: CPT

## 2024-11-04 PROCEDURE — G2211 COMPLEX E/M VISIT ADD ON: CPT | Mod: NC

## 2024-11-21 ENCOUNTER — APPOINTMENT (OUTPATIENT)
Dept: NEUROLOGY | Facility: CLINIC | Age: 74
End: 2024-11-21
Payer: COMMERCIAL

## 2024-11-21 ENCOUNTER — RX RENEWAL (OUTPATIENT)
Age: 74
End: 2024-11-21

## 2024-11-21 VITALS
OXYGEN SATURATION: 96 % | WEIGHT: 242 LBS | HEIGHT: 66 IN | BODY MASS INDEX: 38.89 KG/M2 | DIASTOLIC BLOOD PRESSURE: 84 MMHG | TEMPERATURE: 97.1 F | HEART RATE: 54 BPM | SYSTOLIC BLOOD PRESSURE: 133 MMHG

## 2024-11-21 VITALS
BODY MASS INDEX: 38.89 KG/M2 | HEIGHT: 66 IN | TEMPERATURE: 97.1 F | OXYGEN SATURATION: 96 % | WEIGHT: 242 LBS | HEART RATE: 54 BPM | SYSTOLIC BLOOD PRESSURE: 133 MMHG | DIASTOLIC BLOOD PRESSURE: 84 MMHG

## 2024-11-21 DIAGNOSIS — G51.31 CLONIC HEMIFACIAL SPASM, RIGHT: ICD-10-CM

## 2024-11-21 DIAGNOSIS — G89.29 HEADACHE, UNSPECIFIED: ICD-10-CM

## 2024-11-21 DIAGNOSIS — G25.0 ESSENTIAL TREMOR: ICD-10-CM

## 2024-11-21 DIAGNOSIS — M54.16 RADICULOPATHY, LUMBAR REGION: ICD-10-CM

## 2024-11-21 DIAGNOSIS — R26.89 OTHER ABNORMALITIES OF GAIT AND MOBILITY: ICD-10-CM

## 2024-11-21 DIAGNOSIS — R51.9 HEADACHE, UNSPECIFIED: ICD-10-CM

## 2024-11-21 PROCEDURE — 99215 OFFICE O/P EST HI 40 MIN: CPT

## 2024-11-21 RX ORDER — OMEGA-3/DHA/EPA/FISH OIL 1200 MG
1200 CAPSULE ORAL
Refills: 0 | Status: ACTIVE | COMMUNITY

## 2024-11-21 RX ORDER — CALCIUM CARB/VITAMIN D3/VIT K1 650MG-12.5
TABLET,CHEWABLE ORAL
Refills: 0 | Status: ACTIVE | COMMUNITY

## 2024-11-21 RX ORDER — MAGNESIUM OXIDE/MAG AA CHELATE 300 MG
CAPSULE ORAL
Refills: 0 | Status: ACTIVE | COMMUNITY

## 2024-11-21 RX ORDER — LEVOTHYROXINE SODIUM 0.11 MG/1
112 TABLET ORAL
Refills: 0 | Status: ACTIVE | COMMUNITY

## 2024-11-22 ENCOUNTER — LABORATORY RESULT (OUTPATIENT)
Age: 74
End: 2024-11-22

## 2024-11-22 ENCOUNTER — APPOINTMENT (OUTPATIENT)
Dept: INTERNAL MEDICINE | Facility: CLINIC | Age: 74
End: 2024-11-22
Payer: COMMERCIAL

## 2024-11-22 VITALS
DIASTOLIC BLOOD PRESSURE: 82 MMHG | HEIGHT: 66 IN | SYSTOLIC BLOOD PRESSURE: 122 MMHG | BODY MASS INDEX: 38.89 KG/M2 | WEIGHT: 242 LBS

## 2024-11-22 DIAGNOSIS — E03.9 HYPOTHYROIDISM, UNSPECIFIED: ICD-10-CM

## 2024-11-22 DIAGNOSIS — R68.89 OTHER GENERAL SYMPTOMS AND SIGNS: ICD-10-CM

## 2024-11-22 DIAGNOSIS — E21.3 HYPERPARATHYROIDISM, UNSPECIFIED: ICD-10-CM

## 2024-11-22 PROCEDURE — 99214 OFFICE O/P EST MOD 30 MIN: CPT

## 2024-11-22 PROCEDURE — 36415 COLL VENOUS BLD VENIPUNCTURE: CPT

## 2024-11-22 PROCEDURE — G2211 COMPLEX E/M VISIT ADD ON: CPT | Mod: NC

## 2024-11-22 RX ORDER — TIRZEPATIDE 2.5 MG/.5ML
2.5 INJECTION, SOLUTION SUBCUTANEOUS
Qty: 1 | Refills: 0 | Status: ACTIVE | COMMUNITY
Start: 2024-11-22 | End: 1900-01-01

## 2024-11-25 LAB
ALBUMIN SERPL ELPH-MCNC: 4.2 G/DL
ALP BLD-CCNC: 59 U/L
ALT SERPL-CCNC: 10 U/L
ANION GAP SERPL CALC-SCNC: 13 MMOL/L
AST SERPL-CCNC: 22 U/L
BASOPHILS # BLD AUTO: 0.05 K/UL
BASOPHILS NFR BLD AUTO: 0.8 %
BILIRUB SERPL-MCNC: 0.3 MG/DL
BUN SERPL-MCNC: 23 MG/DL
CALCIUM SERPL-MCNC: 10.3 MG/DL
CHLORIDE SERPL-SCNC: 103 MMOL/L
CHOLEST SERPL-MCNC: 156 MG/DL
CO2 SERPL-SCNC: 24 MMOL/L
CREAT SERPL-MCNC: 0.9 MG/DL
EGFR: 68 ML/MIN/1.73M2
EOSINOPHIL # BLD AUTO: 0.34 K/UL
EOSINOPHIL NFR BLD AUTO: 5.8 %
ESTIMATED AVERAGE GLUCOSE: 111 MG/DL
GLUCOSE SERPL-MCNC: 106 MG/DL
HBA1C MFR BLD HPLC: 5.5 %
HCT VFR BLD CALC: 41.1 %
HDLC SERPL-MCNC: 68 MG/DL
HGB BLD-MCNC: 13 G/DL
IMM GRANULOCYTES NFR BLD AUTO: 0.2 %
LDLC SERPL CALC-MCNC: 70 MG/DL
LYMPHOCYTES # BLD AUTO: 1.57 K/UL
LYMPHOCYTES NFR BLD AUTO: 26.6 %
MAGNESIUM SERPL-MCNC: 2.2 MG/DL
MAN DIFF?: NORMAL
MCHC RBC-ENTMCNC: 28.9 PG
MCHC RBC-ENTMCNC: 31.6 G/DL
MCV RBC AUTO: 91.3 FL
MONOCYTES # BLD AUTO: 0.64 K/UL
MONOCYTES NFR BLD AUTO: 10.8 %
NEUTROPHILS # BLD AUTO: 3.29 K/UL
NEUTROPHILS NFR BLD AUTO: 55.8 %
NONHDLC SERPL-MCNC: 88 MG/DL
PLATELET # BLD AUTO: 268 K/UL
POTASSIUM SERPL-SCNC: 4.7 MMOL/L
PROT SERPL-MCNC: 6.8 G/DL
RBC # BLD: 4.5 M/UL
RBC # FLD: 14.2 %
SODIUM SERPL-SCNC: 141 MMOL/L
T3RU NFR SERPL: 0.9 TBI
T4 SERPL-MCNC: 9.9 UG/DL
TRIGL SERPL-MCNC: 101 MG/DL
TSH SERPL-ACNC: 0.12 UIU/ML
URATE SERPL-MCNC: 5.9 MG/DL
WBC # FLD AUTO: 5.9 K/UL

## 2024-12-02 ENCOUNTER — RX RENEWAL (OUTPATIENT)
Age: 74
End: 2024-12-02

## 2024-12-19 ENCOUNTER — RX RENEWAL (OUTPATIENT)
Age: 74
End: 2024-12-19

## 2024-12-20 ENCOUNTER — RX RENEWAL (OUTPATIENT)
Age: 74
End: 2024-12-20

## 2024-12-23 ENCOUNTER — APPOINTMENT (OUTPATIENT)
Dept: MAMMOGRAPHY | Facility: CLINIC | Age: 74
End: 2024-12-23
Payer: COMMERCIAL

## 2024-12-23 ENCOUNTER — OUTPATIENT (OUTPATIENT)
Dept: OUTPATIENT SERVICES | Facility: HOSPITAL | Age: 74
LOS: 1 days | End: 2024-12-23
Payer: COMMERCIAL

## 2024-12-23 ENCOUNTER — APPOINTMENT (OUTPATIENT)
Dept: ULTRASOUND IMAGING | Facility: CLINIC | Age: 74
End: 2024-12-23
Payer: COMMERCIAL

## 2024-12-23 DIAGNOSIS — Z98.890 OTHER SPECIFIED POSTPROCEDURAL STATES: Chronic | ICD-10-CM

## 2024-12-23 DIAGNOSIS — Z96.653 PRESENCE OF ARTIFICIAL KNEE JOINT, BILATERAL: Chronic | ICD-10-CM

## 2024-12-23 DIAGNOSIS — Z85.828 PERSONAL HISTORY OF OTHER MALIGNANT NEOPLASM OF SKIN: Chronic | ICD-10-CM

## 2024-12-23 DIAGNOSIS — Z90.89 ACQUIRED ABSENCE OF OTHER ORGANS: Chronic | ICD-10-CM

## 2024-12-23 DIAGNOSIS — Z00.8 ENCOUNTER FOR OTHER GENERAL EXAMINATION: ICD-10-CM

## 2024-12-23 PROCEDURE — 77063 BREAST TOMOSYNTHESIS BI: CPT | Mod: 26

## 2024-12-23 PROCEDURE — 77067 SCR MAMMO BI INCL CAD: CPT

## 2024-12-23 PROCEDURE — 77067 SCR MAMMO BI INCL CAD: CPT | Mod: 26

## 2024-12-23 PROCEDURE — 77063 BREAST TOMOSYNTHESIS BI: CPT

## 2024-12-23 PROCEDURE — 76641 ULTRASOUND BREAST COMPLETE: CPT

## 2024-12-23 PROCEDURE — 76641 ULTRASOUND BREAST COMPLETE: CPT | Mod: 26,50

## 2024-12-26 ENCOUNTER — APPOINTMENT (OUTPATIENT)
Dept: ELECTROPHYSIOLOGY | Facility: CLINIC | Age: 74
End: 2024-12-26
Payer: COMMERCIAL

## 2024-12-26 ENCOUNTER — APPOINTMENT (OUTPATIENT)
Dept: ULTRASOUND IMAGING | Facility: CLINIC | Age: 74
End: 2024-12-26
Payer: COMMERCIAL

## 2024-12-26 ENCOUNTER — NON-APPOINTMENT (OUTPATIENT)
Age: 74
End: 2024-12-26

## 2024-12-26 VITALS
WEIGHT: 243 LBS | OXYGEN SATURATION: 94 % | DIASTOLIC BLOOD PRESSURE: 79 MMHG | HEIGHT: 66.5 IN | BODY MASS INDEX: 38.59 KG/M2 | RESPIRATION RATE: 14 BRPM | HEART RATE: 51 BPM | SYSTOLIC BLOOD PRESSURE: 127 MMHG

## 2024-12-26 DIAGNOSIS — C50.919 MALIGNANT NEOPLASM OF UNSPECIFIED SITE OF UNSPECIFIED FEMALE BREAST: ICD-10-CM

## 2024-12-26 PROBLEM — R00.2 PALPITATIONS: Status: ACTIVE | Noted: 2024-12-26

## 2024-12-26 PROCEDURE — 93000 ELECTROCARDIOGRAM COMPLETE: CPT

## 2024-12-26 PROCEDURE — 76642 ULTRASOUND BREAST LIMITED: CPT | Mod: LT

## 2024-12-26 PROCEDURE — 99204 OFFICE O/P NEW MOD 45 MIN: CPT | Mod: 25

## 2024-12-26 PROCEDURE — 77065 DX MAMMO INCL CAD UNI: CPT | Mod: LT

## 2024-12-26 PROCEDURE — 19083 BX BREAST 1ST LESION US IMAG: CPT | Mod: LT

## 2024-12-27 ENCOUNTER — APPOINTMENT (OUTPATIENT)
Dept: ELECTROPHYSIOLOGY | Facility: CLINIC | Age: 74
End: 2024-12-27

## 2025-01-02 ENCOUNTER — RX RENEWAL (OUTPATIENT)
Age: 75
End: 2025-01-02

## 2025-01-02 ENCOUNTER — APPOINTMENT (OUTPATIENT)
Dept: ELECTROPHYSIOLOGY | Facility: CLINIC | Age: 75
End: 2025-01-02
Payer: COMMERCIAL

## 2025-01-02 ENCOUNTER — NON-APPOINTMENT (OUTPATIENT)
Age: 75
End: 2025-01-02

## 2025-01-02 VITALS
RESPIRATION RATE: 14 BRPM | DIASTOLIC BLOOD PRESSURE: 81 MMHG | SYSTOLIC BLOOD PRESSURE: 144 MMHG | WEIGHT: 242 LBS | HEIGHT: 66.5 IN | HEART RATE: 58 BPM | BODY MASS INDEX: 38.43 KG/M2 | OXYGEN SATURATION: 94 %

## 2025-01-02 DIAGNOSIS — I10 ESSENTIAL (PRIMARY) HYPERTENSION: ICD-10-CM

## 2025-01-02 DIAGNOSIS — R00.2 PALPITATIONS: ICD-10-CM

## 2025-01-02 LAB — TSH SERPL-ACNC: 0.68 UIU/ML

## 2025-01-02 PROCEDURE — 99213 OFFICE O/P EST LOW 20 MIN: CPT | Mod: 25

## 2025-01-02 PROCEDURE — 93000 ELECTROCARDIOGRAM COMPLETE: CPT

## 2025-01-21 PROCEDURE — 93248 EXT ECG>7D<15D REV&INTERPJ: CPT

## 2025-01-30 ENCOUNTER — APPOINTMENT (OUTPATIENT)
Dept: CARDIOLOGY | Facility: CLINIC | Age: 75
End: 2025-01-30
Payer: COMMERCIAL

## 2025-01-30 VITALS
DIASTOLIC BLOOD PRESSURE: 85 MMHG | HEIGHT: 66.5 IN | WEIGHT: 240 LBS | HEART RATE: 54 BPM | SYSTOLIC BLOOD PRESSURE: 136 MMHG | BODY MASS INDEX: 38.11 KG/M2 | OXYGEN SATURATION: 93 %

## 2025-01-30 DIAGNOSIS — R73.03 PREDIABETES.: ICD-10-CM

## 2025-01-30 DIAGNOSIS — E03.9 HYPOTHYROIDISM, UNSPECIFIED: ICD-10-CM

## 2025-01-30 DIAGNOSIS — Z17.0 MALIGNANT NEOPLASM OF UPPER-OUTER QUADRANT OF RIGHT FEMALE BREAST: ICD-10-CM

## 2025-01-30 DIAGNOSIS — R06.02 SHORTNESS OF BREATH: ICD-10-CM

## 2025-01-30 DIAGNOSIS — E66.9 OBESITY, UNSPECIFIED: ICD-10-CM

## 2025-01-30 DIAGNOSIS — I50.30 UNSPECIFIED DIASTOLIC (CONGESTIVE) HEART FAILURE: ICD-10-CM

## 2025-01-30 DIAGNOSIS — I10 ESSENTIAL (PRIMARY) HYPERTENSION: ICD-10-CM

## 2025-01-30 DIAGNOSIS — E78.5 HYPERLIPIDEMIA, UNSPECIFIED: ICD-10-CM

## 2025-01-30 DIAGNOSIS — I34.0 NONRHEUMATIC MITRAL (VALVE) INSUFFICIENCY: ICD-10-CM

## 2025-01-30 DIAGNOSIS — R00.2 PALPITATIONS: ICD-10-CM

## 2025-01-30 DIAGNOSIS — C50.411 MALIGNANT NEOPLASM OF UPPER-OUTER QUADRANT OF RIGHT FEMALE BREAST: ICD-10-CM

## 2025-01-30 PROCEDURE — G2211 COMPLEX E/M VISIT ADD ON: CPT | Mod: NC

## 2025-01-30 PROCEDURE — 99214 OFFICE O/P EST MOD 30 MIN: CPT

## 2025-01-30 PROCEDURE — 93000 ELECTROCARDIOGRAM COMPLETE: CPT

## 2025-01-31 ENCOUNTER — APPOINTMENT (OUTPATIENT)
Dept: CARDIOLOGY | Facility: CLINIC | Age: 75
End: 2025-01-31

## 2025-01-31 PROCEDURE — 93306 TTE W/DOPPLER COMPLETE: CPT

## 2025-03-15 ENCOUNTER — RX RENEWAL (OUTPATIENT)
Age: 75
End: 2025-03-15

## 2025-03-17 ENCOUNTER — APPOINTMENT (OUTPATIENT)
Dept: INTERNAL MEDICINE | Facility: CLINIC | Age: 75
End: 2025-03-17
Payer: COMMERCIAL

## 2025-03-17 ENCOUNTER — NON-APPOINTMENT (OUTPATIENT)
Age: 75
End: 2025-03-17

## 2025-03-17 ENCOUNTER — LABORATORY RESULT (OUTPATIENT)
Age: 75
End: 2025-03-17

## 2025-03-17 VITALS
SYSTOLIC BLOOD PRESSURE: 120 MMHG | WEIGHT: 240 LBS | DIASTOLIC BLOOD PRESSURE: 72 MMHG | BODY MASS INDEX: 38.11 KG/M2 | HEIGHT: 66.5 IN

## 2025-03-17 VITALS — DIASTOLIC BLOOD PRESSURE: 72 MMHG | SYSTOLIC BLOOD PRESSURE: 124 MMHG

## 2025-03-17 DIAGNOSIS — E78.5 HYPERLIPIDEMIA, UNSPECIFIED: ICD-10-CM

## 2025-03-17 DIAGNOSIS — E21.3 HYPERPARATHYROIDISM, UNSPECIFIED: ICD-10-CM

## 2025-03-17 DIAGNOSIS — I10 ESSENTIAL (PRIMARY) HYPERTENSION: ICD-10-CM

## 2025-03-17 DIAGNOSIS — E03.9 HYPOTHYROIDISM, UNSPECIFIED: ICD-10-CM

## 2025-03-17 LAB
BILIRUB UR QL STRIP: NORMAL
CLARITY UR: CLEAR
COLLECTION METHOD: NORMAL
GLUCOSE UR-MCNC: NORMAL
HCG UR QL: 0.2 EU/DL
HGB UR QL STRIP.AUTO: NORMAL
KETONES UR-MCNC: NORMAL
LEUKOCYTE ESTERASE UR QL STRIP: NORMAL
NITRITE UR QL STRIP: NORMAL
PH UR STRIP: 5.5
PROT UR STRIP-MCNC: NORMAL
SP GR UR STRIP: 1.02

## 2025-03-17 PROCEDURE — 93000 ELECTROCARDIOGRAM COMPLETE: CPT

## 2025-03-17 PROCEDURE — 99214 OFFICE O/P EST MOD 30 MIN: CPT

## 2025-03-17 PROCEDURE — G2211 COMPLEX E/M VISIT ADD ON: CPT | Mod: NC

## 2025-03-17 PROCEDURE — 36415 COLL VENOUS BLD VENIPUNCTURE: CPT

## 2025-03-17 PROCEDURE — 81003 URINALYSIS AUTO W/O SCOPE: CPT | Mod: QW

## 2025-03-18 ENCOUNTER — APPOINTMENT (OUTPATIENT)
Dept: OBGYN | Facility: CLINIC | Age: 75
End: 2025-03-18
Payer: COMMERCIAL

## 2025-03-18 VITALS — SYSTOLIC BLOOD PRESSURE: 143 MMHG | DIASTOLIC BLOOD PRESSURE: 83 MMHG

## 2025-03-18 LAB
ALBUMIN SERPL ELPH-MCNC: 4.3 G/DL
ALP BLD-CCNC: 62 U/L
ALT SERPL-CCNC: 15 U/L
ANION GAP SERPL CALC-SCNC: 11 MMOL/L
AST SERPL-CCNC: 20 U/L
BASOPHILS # BLD AUTO: 0.06 K/UL
BASOPHILS NFR BLD AUTO: 1 %
BILIRUB SERPL-MCNC: 0.4 MG/DL
BUN SERPL-MCNC: 29 MG/DL
CALCIUM SERPL-MCNC: 10.5 MG/DL
CHLORIDE SERPL-SCNC: 100 MMOL/L
CHOLEST SERPL-MCNC: 161 MG/DL
CO2 SERPL-SCNC: 30 MMOL/L
CREAT SERPL-MCNC: 1.07 MG/DL
CREAT SPEC-SCNC: 62 MG/DL
EGFRCR SERPLBLD CKD-EPI 2021: 55 ML/MIN/1.73M2
EOSINOPHIL # BLD AUTO: 0.4 K/UL
EOSINOPHIL NFR BLD AUTO: 6.6 %
ESTIMATED AVERAGE GLUCOSE: 114 MG/DL
GLUCOSE SERPL-MCNC: 104 MG/DL
HBA1C MFR BLD HPLC: 5.6 %
HCT VFR BLD CALC: 43.3 %
HDLC SERPL-MCNC: 64 MG/DL
HGB BLD-MCNC: 13.5 G/DL
IMM GRANULOCYTES NFR BLD AUTO: 0.2 %
LDLC SERPL-MCNC: 81 MG/DL
LYMPHOCYTES # BLD AUTO: 1.55 K/UL
LYMPHOCYTES NFR BLD AUTO: 25.7 %
MAN DIFF?: NORMAL
MCHC RBC-ENTMCNC: 28.9 PG
MCHC RBC-ENTMCNC: 31.2 G/DL
MCV RBC AUTO: 92.7 FL
MICROALBUMIN 24H UR DL<=1MG/L-MCNC: <1.2 MG/DL
MICROALBUMIN/CREAT 24H UR-RTO: NORMAL MG/G
MONOCYTES # BLD AUTO: 0.67 K/UL
MONOCYTES NFR BLD AUTO: 11.1 %
NEUTROPHILS # BLD AUTO: 3.35 K/UL
NEUTROPHILS NFR BLD AUTO: 55.4 %
NONHDLC SERPL-MCNC: 96 MG/DL
PLATELET # BLD AUTO: 277 K/UL
POTASSIUM SERPL-SCNC: 4.8 MMOL/L
PROT SERPL-MCNC: 6.9 G/DL
RBC # BLD: 4.67 M/UL
RBC # FLD: 14.1 %
SODIUM SERPL-SCNC: 141 MMOL/L
T3RU NFR SERPL: 1 TBI
T4 SERPL-MCNC: 10.1 UG/DL
TRIGL SERPL-MCNC: 84 MG/DL
TSH SERPL-ACNC: 0.62 UIU/ML
URATE SERPL-MCNC: 8 MG/DL
VIT B12 SERPL-MCNC: 752 PG/ML
WBC # FLD AUTO: 6.04 K/UL

## 2025-03-18 PROCEDURE — 99213 OFFICE O/P EST LOW 20 MIN: CPT

## 2025-03-24 ENCOUNTER — APPOINTMENT (OUTPATIENT)
Dept: NEUROLOGY | Facility: CLINIC | Age: 75
End: 2025-03-24
Payer: COMMERCIAL

## 2025-03-24 VITALS
OXYGEN SATURATION: 96 % | BODY MASS INDEX: 38.91 KG/M2 | SYSTOLIC BLOOD PRESSURE: 110 MMHG | DIASTOLIC BLOOD PRESSURE: 73 MMHG | HEIGHT: 66.5 IN | HEART RATE: 52 BPM | WEIGHT: 245 LBS

## 2025-03-24 DIAGNOSIS — G51.31 CLONIC HEMIFACIAL SPASM, RIGHT: ICD-10-CM

## 2025-03-24 DIAGNOSIS — G89.29 HEADACHE, UNSPECIFIED: ICD-10-CM

## 2025-03-24 DIAGNOSIS — M54.16 RADICULOPATHY, LUMBAR REGION: ICD-10-CM

## 2025-03-24 DIAGNOSIS — R51.9 HEADACHE, UNSPECIFIED: ICD-10-CM

## 2025-03-24 PROCEDURE — 99215 OFFICE O/P EST HI 40 MIN: CPT

## 2025-03-24 RX ORDER — TIRZEPATIDE 2.5 MG/.5ML
2.5 INJECTION, SOLUTION SUBCUTANEOUS
Qty: 1 | Refills: 3 | Status: COMPLETED | COMMUNITY
Start: 2025-03-17 | End: 2025-03-24

## 2025-03-25 ENCOUNTER — APPOINTMENT (OUTPATIENT)
Dept: OBGYN | Facility: CLINIC | Age: 75
End: 2025-03-25
Payer: COMMERCIAL

## 2025-03-25 VITALS — DIASTOLIC BLOOD PRESSURE: 79 MMHG | SYSTOLIC BLOOD PRESSURE: 140 MMHG

## 2025-03-25 PROCEDURE — 99212 OFFICE O/P EST SF 10 MIN: CPT

## 2025-03-25 RX ORDER — TIRZEPATIDE 2.5 MG/.5ML
2.5 INJECTION, SOLUTION SUBCUTANEOUS
Qty: 1 | Refills: 1 | Status: ACTIVE | COMMUNITY
Start: 2025-03-25 | End: 1900-01-01

## 2025-04-14 ENCOUNTER — APPOINTMENT (OUTPATIENT)
Dept: INTERNAL MEDICINE | Facility: CLINIC | Age: 75
End: 2025-04-14

## 2025-05-01 ENCOUNTER — OUTPATIENT (OUTPATIENT)
Dept: OUTPATIENT SERVICES | Facility: HOSPITAL | Age: 75
LOS: 1 days | Discharge: HOME | End: 2025-05-01

## 2025-05-01 DIAGNOSIS — Z98.890 OTHER SPECIFIED POSTPROCEDURAL STATES: Chronic | ICD-10-CM

## 2025-05-01 DIAGNOSIS — Z96.653 PRESENCE OF ARTIFICIAL KNEE JOINT, BILATERAL: Chronic | ICD-10-CM

## 2025-05-01 DIAGNOSIS — C50.919 MALIGNANT NEOPLASM OF UNSPECIFIED SITE OF UNSPECIFIED FEMALE BREAST: ICD-10-CM

## 2025-05-01 DIAGNOSIS — Z90.89 ACQUIRED ABSENCE OF OTHER ORGANS: Chronic | ICD-10-CM

## 2025-05-05 ENCOUNTER — APPOINTMENT (OUTPATIENT)
Dept: HEMATOLOGY ONCOLOGY | Facility: CLINIC | Age: 75
End: 2025-05-05
Payer: COMMERCIAL

## 2025-05-05 VITALS
HEART RATE: 51 BPM | WEIGHT: 244.91 LBS | OXYGEN SATURATION: 95 % | SYSTOLIC BLOOD PRESSURE: 125 MMHG | DIASTOLIC BLOOD PRESSURE: 79 MMHG | BODY MASS INDEX: 41.81 KG/M2 | RESPIRATION RATE: 14 BRPM | TEMPERATURE: 97 F | HEIGHT: 64 IN

## 2025-05-05 DIAGNOSIS — N89.8 OTHER SPECIFIED NONINFLAMMATORY DISORDERS OF VAGINA: ICD-10-CM

## 2025-05-05 DIAGNOSIS — Z79.811 LONG TERM (CURRENT) USE OF AROMATASE INHIBITORS: ICD-10-CM

## 2025-05-05 DIAGNOSIS — C50.919 MALIGNANT NEOPLASM OF UNSPECIFIED SITE OF UNSPECIFIED FEMALE BREAST: ICD-10-CM

## 2025-05-05 DIAGNOSIS — N95.2 POSTMENOPAUSAL ATROPHIC VAGINITIS: ICD-10-CM

## 2025-05-05 PROCEDURE — 99213 OFFICE O/P EST LOW 20 MIN: CPT

## 2025-05-05 PROCEDURE — G2211 COMPLEX E/M VISIT ADD ON: CPT | Mod: NC

## 2025-05-06 ENCOUNTER — RX RENEWAL (OUTPATIENT)
Age: 75
End: 2025-05-06

## 2025-05-08 ENCOUNTER — RX RENEWAL (OUTPATIENT)
Age: 75
End: 2025-05-08

## 2025-05-09 ENCOUNTER — OUTPATIENT (OUTPATIENT)
Dept: OUTPATIENT SERVICES | Facility: HOSPITAL | Age: 75
LOS: 1 days | End: 2025-05-09
Payer: COMMERCIAL

## 2025-05-09 ENCOUNTER — APPOINTMENT (OUTPATIENT)
Dept: MRI IMAGING | Facility: CLINIC | Age: 75
End: 2025-05-09
Payer: COMMERCIAL

## 2025-05-09 DIAGNOSIS — Z96.653 PRESENCE OF ARTIFICIAL KNEE JOINT, BILATERAL: Chronic | ICD-10-CM

## 2025-05-09 DIAGNOSIS — Z98.890 OTHER SPECIFIED POSTPROCEDURAL STATES: Chronic | ICD-10-CM

## 2025-05-09 DIAGNOSIS — Z90.89 ACQUIRED ABSENCE OF OTHER ORGANS: Chronic | ICD-10-CM

## 2025-05-09 DIAGNOSIS — Z85.828 PERSONAL HISTORY OF OTHER MALIGNANT NEOPLASM OF SKIN: Chronic | ICD-10-CM

## 2025-05-09 DIAGNOSIS — Z00.8 ENCOUNTER FOR OTHER GENERAL EXAMINATION: ICD-10-CM

## 2025-05-09 PROCEDURE — A9585: CPT

## 2025-05-09 PROCEDURE — 77049 MRI BREAST C-+ W/CAD BI: CPT | Mod: 26

## 2025-05-09 PROCEDURE — C8937: CPT

## 2025-05-09 PROCEDURE — C8908: CPT

## 2025-06-16 ENCOUNTER — APPOINTMENT (OUTPATIENT)
Dept: OBGYN | Facility: CLINIC | Age: 75
End: 2025-06-16
Payer: COMMERCIAL

## 2025-06-16 ENCOUNTER — NON-APPOINTMENT (OUTPATIENT)
Age: 75
End: 2025-06-16

## 2025-06-16 VITALS
HEIGHT: 64 IN | DIASTOLIC BLOOD PRESSURE: 80 MMHG | WEIGHT: 247 LBS | SYSTOLIC BLOOD PRESSURE: 122 MMHG | BODY MASS INDEX: 42.17 KG/M2

## 2025-06-16 PROBLEM — Z01.419 WELL WOMAN EXAM WITH ROUTINE GYNECOLOGICAL EXAM: Status: ACTIVE | Noted: 2025-06-16

## 2025-06-16 PROCEDURE — 99459 PELVIC EXAMINATION: CPT

## 2025-06-16 PROCEDURE — G0444 DEPRESSION SCREEN ANNUAL: CPT | Mod: 59

## 2025-06-16 PROCEDURE — 99397 PER PM REEVAL EST PAT 65+ YR: CPT

## 2025-06-18 ENCOUNTER — APPOINTMENT (OUTPATIENT)
Dept: ELECTROPHYSIOLOGY | Facility: CLINIC | Age: 75
End: 2025-06-18
Payer: COMMERCIAL

## 2025-06-18 ENCOUNTER — APPOINTMENT (OUTPATIENT)
Dept: ELECTROPHYSIOLOGY | Facility: CLINIC | Age: 75
End: 2025-06-18

## 2025-06-18 VITALS — OXYGEN SATURATION: 94 % | SYSTOLIC BLOOD PRESSURE: 117 MMHG | DIASTOLIC BLOOD PRESSURE: 87 MMHG | HEART RATE: 75 BPM

## 2025-06-18 PROBLEM — I48.91 ATRIAL FIBRILLATION, UNSPECIFIED TYPE: Status: ACTIVE | Noted: 2025-06-18

## 2025-06-18 LAB — HPV HIGH+LOW RISK DNA PNL CVX: NOT DETECTED

## 2025-06-18 PROCEDURE — 99213 OFFICE O/P EST LOW 20 MIN: CPT | Mod: 25

## 2025-06-18 PROCEDURE — 93000 ELECTROCARDIOGRAM COMPLETE: CPT

## 2025-06-18 RX ORDER — APIXABAN 5 MG/1
5 TABLET, FILM COATED ORAL
Qty: 180 | Refills: 3 | Status: ACTIVE | COMMUNITY
Start: 2025-06-18 | End: 1900-01-01

## 2025-06-21 LAB — CYTOLOGY CVX/VAG DOC THIN PREP: ABNORMAL

## 2025-06-24 ENCOUNTER — APPOINTMENT (OUTPATIENT)
Dept: INTERNAL MEDICINE | Facility: CLINIC | Age: 75
End: 2025-06-24
Payer: COMMERCIAL

## 2025-06-24 ENCOUNTER — LABORATORY RESULT (OUTPATIENT)
Age: 75
End: 2025-06-24

## 2025-06-24 VITALS — SYSTOLIC BLOOD PRESSURE: 120 MMHG | BODY MASS INDEX: 42.57 KG/M2 | DIASTOLIC BLOOD PRESSURE: 84 MMHG | WEIGHT: 248 LBS

## 2025-06-24 PROCEDURE — G2211 COMPLEX E/M VISIT ADD ON: CPT | Mod: NC

## 2025-06-24 PROCEDURE — 99214 OFFICE O/P EST MOD 30 MIN: CPT

## 2025-06-24 PROCEDURE — 36415 COLL VENOUS BLD VENIPUNCTURE: CPT

## 2025-06-25 LAB
ALBUMIN SERPL ELPH-MCNC: 4.3 G/DL
ALP BLD-CCNC: 63 U/L
ALT SERPL-CCNC: 14 U/L
ANION GAP SERPL CALC-SCNC: 16 MMOL/L
AST SERPL-CCNC: 22 U/L
BASOPHILS # BLD AUTO: 0.04 K/UL
BASOPHILS NFR BLD AUTO: 0.6 %
BILIRUB SERPL-MCNC: 0.4 MG/DL
BUN SERPL-MCNC: 27 MG/DL
CALCIUM SERPL-MCNC: 10.3 MG/DL
CHLORIDE SERPL-SCNC: 100 MMOL/L
CHOLEST SERPL-MCNC: 144 MG/DL
CO2 SERPL-SCNC: 26 MMOL/L
CREAT SERPL-MCNC: 1.04 MG/DL
CREAT SPEC-SCNC: 62 MG/DL
EGFRCR SERPLBLD CKD-EPI 2021: 57 ML/MIN/1.73M2
EOSINOPHIL # BLD AUTO: 0.32 K/UL
EOSINOPHIL NFR BLD AUTO: 4.7 %
ESTIMATED AVERAGE GLUCOSE: 111 MG/DL
GLUCOSE SERPL-MCNC: 92 MG/DL
HBA1C MFR BLD HPLC: 5.5 %
HCT VFR BLD CALC: 42.6 %
HDLC SERPL-MCNC: 61 MG/DL
HGB BLD-MCNC: 13.6 G/DL
IMM GRANULOCYTES NFR BLD AUTO: 0.1 %
LDLC SERPL-MCNC: 65 MG/DL
LYMPHOCYTES # BLD AUTO: 1.59 K/UL
LYMPHOCYTES NFR BLD AUTO: 23.5 %
MAN DIFF?: NORMAL
MCHC RBC-ENTMCNC: 28.9 PG
MCHC RBC-ENTMCNC: 31.9 G/DL
MCV RBC AUTO: 90.4 FL
MICROALBUMIN 24H UR DL<=1MG/L-MCNC: <1.2 MG/DL
MICROALBUMIN/CREAT 24H UR-RTO: NORMAL MG/G
MONOCYTES # BLD AUTO: 0.71 K/UL
MONOCYTES NFR BLD AUTO: 10.5 %
NEUTROPHILS # BLD AUTO: 4.1 K/UL
NEUTROPHILS NFR BLD AUTO: 60.6 %
NONHDLC SERPL-MCNC: 83 MG/DL
PLATELET # BLD AUTO: 289 K/UL
POTASSIUM SERPL-SCNC: 4.5 MMOL/L
PROT SERPL-MCNC: 7.1 G/DL
RBC # BLD: 4.71 M/UL
RBC # FLD: 13.8 %
SODIUM SERPL-SCNC: 142 MMOL/L
T3RU NFR SERPL: 1 TBI
T4 SERPL-MCNC: 10.3 UG/DL
TRIGL SERPL-MCNC: 98 MG/DL
TSH SERPL-ACNC: 0.3 UIU/ML
URATE SERPL-MCNC: 7.4 MG/DL
VIT B12 SERPL-MCNC: 828 PG/ML
WBC # FLD AUTO: 6.77 K/UL

## 2025-07-13 PROCEDURE — 93244 EXT ECG>48HR<7D REV&INTERPJ: CPT

## 2025-07-17 ENCOUNTER — APPOINTMENT (OUTPATIENT)
Dept: CARDIOLOGY | Facility: CLINIC | Age: 75
End: 2025-07-17
Payer: COMMERCIAL

## 2025-07-17 VITALS
BODY MASS INDEX: 42.34 KG/M2 | HEART RATE: 66 BPM | WEIGHT: 248 LBS | OXYGEN SATURATION: 95 % | DIASTOLIC BLOOD PRESSURE: 78 MMHG | SYSTOLIC BLOOD PRESSURE: 119 MMHG | HEIGHT: 64 IN

## 2025-07-17 PROBLEM — I48.0 AF (PAROXYSMAL ATRIAL FIBRILLATION): Status: ACTIVE | Noted: 2025-06-18

## 2025-07-17 PROCEDURE — G2211 COMPLEX E/M VISIT ADD ON: CPT | Mod: NC

## 2025-07-17 PROCEDURE — 99213 OFFICE O/P EST LOW 20 MIN: CPT

## 2025-07-17 PROCEDURE — 93000 ELECTROCARDIOGRAM COMPLETE: CPT

## 2025-07-24 ENCOUNTER — APPOINTMENT (OUTPATIENT)
Dept: NEUROLOGY | Facility: CLINIC | Age: 75
End: 2025-07-24
Payer: COMMERCIAL

## 2025-07-24 VITALS
SYSTOLIC BLOOD PRESSURE: 102 MMHG | BODY MASS INDEX: 42.34 KG/M2 | TEMPERATURE: 97 F | OXYGEN SATURATION: 96 % | HEIGHT: 64 IN | WEIGHT: 248 LBS | HEART RATE: 62 BPM | DIASTOLIC BLOOD PRESSURE: 72 MMHG

## 2025-07-24 VITALS
HEIGHT: 64 IN | WEIGHT: 248 LBS | OXYGEN SATURATION: 96 % | TEMPERATURE: 97 F | BODY MASS INDEX: 42.34 KG/M2 | DIASTOLIC BLOOD PRESSURE: 72 MMHG | SYSTOLIC BLOOD PRESSURE: 102 MMHG | HEART RATE: 62 BPM

## 2025-07-24 DIAGNOSIS — R51.9 HEADACHE, UNSPECIFIED: ICD-10-CM

## 2025-07-24 DIAGNOSIS — G51.31 CLONIC HEMIFACIAL SPASM, RIGHT: ICD-10-CM

## 2025-07-24 DIAGNOSIS — H81.10 BENIGN PAROXYSMAL VERTIGO, UNSPECIFIED EAR: ICD-10-CM

## 2025-07-24 DIAGNOSIS — G25.0 ESSENTIAL TREMOR: ICD-10-CM

## 2025-07-24 DIAGNOSIS — G89.29 HEADACHE, UNSPECIFIED: ICD-10-CM

## 2025-07-24 PROCEDURE — 99215 OFFICE O/P EST HI 40 MIN: CPT

## 2025-07-24 PROCEDURE — G2211 COMPLEX E/M VISIT ADD ON: CPT | Mod: NC

## 2025-07-24 RX ORDER — NIRMATRELVIR AND RITONAVIR 150-100 MG
10 X 150 MG & KIT ORAL
Qty: 1 | Refills: 0 | Status: COMPLETED | COMMUNITY
Start: 2025-07-22 | End: 2025-07-24

## 2025-07-31 ENCOUNTER — APPOINTMENT (OUTPATIENT)
Dept: CARDIOLOGY | Facility: CLINIC | Age: 75
End: 2025-07-31

## 2025-08-21 ENCOUNTER — APPOINTMENT (OUTPATIENT)
Dept: INTERNAL MEDICINE | Facility: CLINIC | Age: 75
End: 2025-08-21
Payer: COMMERCIAL

## 2025-08-21 VITALS
SYSTOLIC BLOOD PRESSURE: 123 MMHG | HEIGHT: 64 IN | TEMPERATURE: 98.5 F | OXYGEN SATURATION: 96 % | BODY MASS INDEX: 42.17 KG/M2 | WEIGHT: 247 LBS | HEART RATE: 54 BPM | DIASTOLIC BLOOD PRESSURE: 76 MMHG

## 2025-08-21 DIAGNOSIS — R05.9 COUGH, UNSPECIFIED: ICD-10-CM

## 2025-08-21 PROCEDURE — 99214 OFFICE O/P EST MOD 30 MIN: CPT

## 2025-08-21 RX ORDER — ALBUTEROL SULFATE 90 UG/1
108 (90 BASE) INHALANT RESPIRATORY (INHALATION)
Qty: 1 | Refills: 0 | Status: ACTIVE | COMMUNITY
Start: 2025-08-21 | End: 1900-01-01

## 2025-08-21 RX ORDER — DOXYCYCLINE HYCLATE 100 MG/1
100 TABLET, COATED ORAL
Qty: 14 | Refills: 0 | Status: ACTIVE | COMMUNITY
Start: 2025-08-21 | End: 1900-01-01

## 2025-08-22 ENCOUNTER — NON-APPOINTMENT (OUTPATIENT)
Age: 75
End: 2025-08-22

## 2025-08-25 ENCOUNTER — OUTPATIENT (OUTPATIENT)
Dept: OUTPATIENT SERVICES | Facility: HOSPITAL | Age: 75
LOS: 1 days | End: 2025-08-25
Payer: COMMERCIAL

## 2025-08-25 VITALS
OXYGEN SATURATION: 96 % | DIASTOLIC BLOOD PRESSURE: 76 MMHG | TEMPERATURE: 98 F | HEART RATE: 54 BPM | HEIGHT: 64 IN | WEIGHT: 242.95 LBS | RESPIRATION RATE: 12 BRPM | SYSTOLIC BLOOD PRESSURE: 138 MMHG

## 2025-08-25 DIAGNOSIS — I48.0 PAROXYSMAL ATRIAL FIBRILLATION: ICD-10-CM

## 2025-08-25 DIAGNOSIS — Z98.890 OTHER SPECIFIED POSTPROCEDURAL STATES: Chronic | ICD-10-CM

## 2025-08-25 DIAGNOSIS — Z90.89 ACQUIRED ABSENCE OF OTHER ORGANS: Chronic | ICD-10-CM

## 2025-08-25 DIAGNOSIS — Z96.653 PRESENCE OF ARTIFICIAL KNEE JOINT, BILATERAL: Chronic | ICD-10-CM

## 2025-08-25 DIAGNOSIS — Z01.818 ENCOUNTER FOR OTHER PREPROCEDURAL EXAMINATION: ICD-10-CM

## 2025-08-25 DIAGNOSIS — Z85.828 PERSONAL HISTORY OF OTHER MALIGNANT NEOPLASM OF SKIN: Chronic | ICD-10-CM

## 2025-08-25 LAB
ANION GAP SERPL CALC-SCNC: 13 MMOL/L — SIGNIFICANT CHANGE UP (ref 5–17)
BLD GP AB SCN SERPL QL: NEGATIVE — SIGNIFICANT CHANGE UP
BUN SERPL-MCNC: 31 MG/DL — HIGH (ref 7–23)
CALCIUM SERPL-MCNC: 10.4 MG/DL — SIGNIFICANT CHANGE UP (ref 8.4–10.5)
CHLORIDE SERPL-SCNC: 101 MMOL/L — SIGNIFICANT CHANGE UP (ref 96–108)
CO2 SERPL-SCNC: 23 MMOL/L — SIGNIFICANT CHANGE UP (ref 22–31)
CREAT SERPL-MCNC: 1 MG/DL — SIGNIFICANT CHANGE UP (ref 0.5–1.3)
EGFR: 60 ML/MIN/1.73M2 — SIGNIFICANT CHANGE UP
EGFR: 60 ML/MIN/1.73M2 — SIGNIFICANT CHANGE UP
GLUCOSE SERPL-MCNC: 86 MG/DL — SIGNIFICANT CHANGE UP (ref 70–99)
HCT VFR BLD CALC: 40.3 % — SIGNIFICANT CHANGE UP (ref 34.5–45)
HGB BLD-MCNC: 12.7 G/DL — SIGNIFICANT CHANGE UP (ref 11.5–15.5)
MCHC RBC-ENTMCNC: 28.5 PG — SIGNIFICANT CHANGE UP (ref 27–34)
MCHC RBC-ENTMCNC: 31.5 G/DL — LOW (ref 32–36)
MCV RBC AUTO: 90.4 FL — SIGNIFICANT CHANGE UP (ref 80–100)
NRBC # BLD AUTO: 0 K/UL — SIGNIFICANT CHANGE UP (ref 0–0)
NRBC # FLD: 0 K/UL — SIGNIFICANT CHANGE UP (ref 0–0)
NRBC BLD AUTO-RTO: 0 /100 WBCS — SIGNIFICANT CHANGE UP (ref 0–0)
PLATELET # BLD AUTO: 277 K/UL — SIGNIFICANT CHANGE UP (ref 150–400)
PMV BLD: 9.9 FL — SIGNIFICANT CHANGE UP (ref 7–13)
POTASSIUM SERPL-MCNC: 4.7 MMOL/L — SIGNIFICANT CHANGE UP (ref 3.5–5.3)
POTASSIUM SERPL-SCNC: 4.7 MMOL/L — SIGNIFICANT CHANGE UP (ref 3.5–5.3)
RBC # BLD: 4.46 M/UL — SIGNIFICANT CHANGE UP (ref 3.8–5.2)
RBC # FLD: 13.4 % — SIGNIFICANT CHANGE UP (ref 10.3–14.5)
RH IG SCN BLD-IMP: NEGATIVE — SIGNIFICANT CHANGE UP
SODIUM SERPL-SCNC: 137 MMOL/L — SIGNIFICANT CHANGE UP (ref 135–145)
WBC # BLD: 6.38 K/UL — SIGNIFICANT CHANGE UP (ref 3.8–10.5)
WBC # FLD AUTO: 6.38 K/UL — SIGNIFICANT CHANGE UP (ref 3.8–10.5)

## 2025-08-25 PROCEDURE — 85027 COMPLETE CBC AUTOMATED: CPT

## 2025-08-25 PROCEDURE — G0463: CPT

## 2025-08-25 PROCEDURE — 80048 BASIC METABOLIC PNL TOTAL CA: CPT

## 2025-08-25 PROCEDURE — 86900 BLOOD TYPING SEROLOGIC ABO: CPT

## 2025-08-25 PROCEDURE — 83036 HEMOGLOBIN GLYCOSYLATED A1C: CPT

## 2025-08-25 PROCEDURE — 86901 BLOOD TYPING SEROLOGIC RH(D): CPT

## 2025-08-25 PROCEDURE — 86850 RBC ANTIBODY SCREEN: CPT

## 2025-08-25 RX ORDER — ATORVASTATIN CALCIUM 80 MG/1
1 TABLET, FILM COATED ORAL
Refills: 0 | DISCHARGE

## 2025-08-25 RX ORDER — DULOXETINE 20 MG/1
1 CAPSULE, DELAYED RELEASE ORAL
Refills: 0 | DISCHARGE

## 2025-08-25 RX ORDER — LEVOTHYROXINE SODIUM 300 MCG
1 TABLET ORAL
Refills: 0 | DISCHARGE

## 2025-08-25 RX ORDER — METFORMIN HYDROCHLORIDE 850 MG/1
2 TABLET ORAL
Refills: 0 | DISCHARGE

## 2025-08-25 RX ORDER — PROPRANOLOL HCL 60 MG
1 TABLET ORAL
Refills: 0 | DISCHARGE

## 2025-08-25 RX ORDER — FREMANEZUMAB-VFRM 225 MG/1.5ML
225 INJECTION SUBCUTANEOUS
Refills: 0 | DISCHARGE

## 2025-08-25 RX ORDER — BUDESONIDE AND FORMOTEROL FUMARATE DIHYDRATE 80; 4.5 UG/1; UG/1
2 AEROSOL RESPIRATORY (INHALATION)
Refills: 0 | DISCHARGE

## 2025-08-25 RX ORDER — FUROSEMIDE 10 MG/ML
2 INJECTION INTRAMUSCULAR; INTRAVENOUS
Refills: 0 | DISCHARGE

## 2025-08-25 RX ORDER — APIXABAN 2.5 MG/1
1 TABLET, FILM COATED ORAL
Refills: 0 | DISCHARGE

## 2025-08-25 RX ORDER — LIOTHYRONINE SODIUM 25 UG/1
1 TABLET ORAL
Refills: 0 | DISCHARGE

## 2025-08-26 LAB
A1C WITH ESTIMATED AVERAGE GLUCOSE RESULT: 5.6 % — SIGNIFICANT CHANGE UP (ref 4–5.6)
ESTIMATED AVERAGE GLUCOSE: 114 MG/DL — SIGNIFICANT CHANGE UP (ref 68–114)

## 2025-08-29 ENCOUNTER — OUTPATIENT (OUTPATIENT)
Dept: INPATIENT UNIT | Facility: HOSPITAL | Age: 75
LOS: 1 days | End: 2025-08-29
Payer: COMMERCIAL

## 2025-08-29 ENCOUNTER — TRANSCRIPTION ENCOUNTER (OUTPATIENT)
Age: 75
End: 2025-08-29

## 2025-08-29 VITALS
HEART RATE: 54 BPM | RESPIRATION RATE: 16 BRPM | WEIGHT: 244.93 LBS | SYSTOLIC BLOOD PRESSURE: 153 MMHG | OXYGEN SATURATION: 95 % | DIASTOLIC BLOOD PRESSURE: 72 MMHG | HEIGHT: 64 IN | TEMPERATURE: 99 F

## 2025-08-29 VITALS
DIASTOLIC BLOOD PRESSURE: 74 MMHG | HEART RATE: 81 BPM | RESPIRATION RATE: 16 BRPM | SYSTOLIC BLOOD PRESSURE: 126 MMHG | OXYGEN SATURATION: 95 %

## 2025-08-29 DIAGNOSIS — Z98.890 OTHER SPECIFIED POSTPROCEDURAL STATES: Chronic | ICD-10-CM

## 2025-08-29 DIAGNOSIS — Z90.89 ACQUIRED ABSENCE OF OTHER ORGANS: Chronic | ICD-10-CM

## 2025-08-29 DIAGNOSIS — I48.0 PAROXYSMAL ATRIAL FIBRILLATION: ICD-10-CM

## 2025-08-29 DIAGNOSIS — Z96.653 PRESENCE OF ARTIFICIAL KNEE JOINT, BILATERAL: Chronic | ICD-10-CM

## 2025-08-29 DIAGNOSIS — Z85.828 PERSONAL HISTORY OF OTHER MALIGNANT NEOPLASM OF SKIN: Chronic | ICD-10-CM

## 2025-08-29 PROCEDURE — 93655 ICAR CATH ABLTJ DSCRT ARRHYT: CPT

## 2025-08-29 PROCEDURE — 93010 ELECTROCARDIOGRAM REPORT: CPT

## 2025-08-29 PROCEDURE — 93656 COMPRE EP EVAL ABLTJ ATR FIB: CPT

## 2025-08-29 PROCEDURE — C1894: CPT

## 2025-08-29 PROCEDURE — C1766: CPT

## 2025-08-29 PROCEDURE — C9399: CPT

## 2025-08-29 PROCEDURE — C1759: CPT

## 2025-08-29 PROCEDURE — 93005 ELECTROCARDIOGRAM TRACING: CPT

## 2025-08-29 PROCEDURE — 93657 TX L/R ATRIAL FIB ADDL: CPT

## 2025-08-29 PROCEDURE — C1733: CPT

## 2025-08-29 PROCEDURE — C1730: CPT

## 2025-08-29 RX ORDER — ALBUTEROL SULFATE 2.5 MG/3ML
2 VIAL, NEBULIZER (ML) INHALATION EVERY 6 HOURS
Refills: 0 | Status: ACTIVE | OUTPATIENT
Start: 2025-08-29 | End: 2026-07-28

## 2025-08-29 RX ORDER — DULOXETINE 20 MG/1
60 CAPSULE, DELAYED RELEASE ORAL DAILY
Refills: 0 | Status: ACTIVE | OUTPATIENT
Start: 2025-08-29 | End: 2026-07-28

## 2025-08-29 RX ORDER — ATORVASTATIN CALCIUM 80 MG/1
20 TABLET, FILM COATED ORAL AT BEDTIME
Refills: 0 | Status: ACTIVE | OUTPATIENT
Start: 2025-08-29 | End: 2026-07-28

## 2025-08-29 RX ORDER — DOXYCYCLINE HYCLATE 100 MG
1 TABLET ORAL
Refills: 0 | DISCHARGE
End: 2025-08-27

## 2025-08-29 RX ORDER — APIXABAN 2.5 MG/1
5 TABLET, FILM COATED ORAL
Refills: 0 | Status: ACTIVE | OUTPATIENT
Start: 2025-08-29 | End: 2026-07-28

## 2025-08-29 RX ORDER — ANASTROZOLE 1 MG/1
1 TABLET ORAL DAILY
Refills: 0 | Status: ACTIVE | OUTPATIENT
Start: 2025-08-29 | End: 2026-07-28

## 2025-08-29 RX ORDER — ACETAMINOPHEN 500 MG/5ML
650 LIQUID (ML) ORAL ONCE
Refills: 0 | Status: COMPLETED | OUTPATIENT
Start: 2025-08-29 | End: 2025-08-29

## 2025-08-29 RX ADMIN — Medication 650 MILLIGRAM(S): at 15:56

## 2025-08-29 RX ADMIN — APIXABAN 5 MILLIGRAM(S): 2.5 TABLET, FILM COATED ORAL at 15:15

## 2025-08-30 ENCOUNTER — NON-APPOINTMENT (OUTPATIENT)
Age: 75
End: 2025-08-30

## 2025-09-02 ENCOUNTER — APPOINTMENT (OUTPATIENT)
Dept: OBGYN | Facility: CLINIC | Age: 75
End: 2025-09-02

## 2025-09-04 ENCOUNTER — APPOINTMENT (OUTPATIENT)
Dept: OBGYN | Facility: CLINIC | Age: 75
End: 2025-09-04
Payer: COMMERCIAL

## 2025-09-04 ENCOUNTER — ASOB RESULT (OUTPATIENT)
Age: 75
End: 2025-09-04

## 2025-09-04 PROCEDURE — 76830 TRANSVAGINAL US NON-OB: CPT

## 2025-09-04 PROCEDURE — 77080 DXA BONE DENSITY AXIAL: CPT

## 2025-09-10 ENCOUNTER — APPOINTMENT (OUTPATIENT)
Dept: SURGERY | Facility: CLINIC | Age: 75
End: 2025-09-10
Payer: COMMERCIAL

## 2025-09-10 PROCEDURE — 99213K: CUSTOM

## 2025-09-22 PROBLEM — M65.341 TRIGGER RING FINGER OF RIGHT HAND: Status: ACTIVE | Noted: 2025-09-22
